# Patient Record
Sex: FEMALE | Race: WHITE | Employment: FULL TIME | URBAN - METROPOLITAN AREA
[De-identification: names, ages, dates, MRNs, and addresses within clinical notes are randomized per-mention and may not be internally consistent; named-entity substitution may affect disease eponyms.]

---

## 2017-01-23 ENCOUNTER — HOSPITAL ENCOUNTER (OUTPATIENT)
Age: 62
Discharge: HOME | End: 2017-01-23
Attending: FAMILY MEDICINE
Payer: COMMERCIAL

## 2017-01-23 VITALS
HEIGHT: 63 IN | HEART RATE: 92 BPM | WEIGHT: 250 LBS | SYSTOLIC BLOOD PRESSURE: 151 MMHG | BODY MASS INDEX: 44.3 KG/M2 | DIASTOLIC BLOOD PRESSURE: 65 MMHG | RESPIRATION RATE: 20 BRPM | OXYGEN SATURATION: 98 % | TEMPERATURE: 98 F

## 2017-01-23 DIAGNOSIS — J40 BRONCHITIS: Primary | ICD-10-CM

## 2017-01-23 PROCEDURE — 99213 OFFICE O/P EST LOW 20 MIN: CPT

## 2017-01-23 PROCEDURE — 99214 OFFICE O/P EST MOD 30 MIN: CPT

## 2017-01-23 RX ORDER — AZITHROMYCIN 250 MG/1
TABLET, FILM COATED ORAL
Qty: 1 PACKAGE | Refills: 0 | Status: SHIPPED | OUTPATIENT
Start: 2017-01-23 | End: 2017-02-17 | Stop reason: ALTCHOICE

## 2017-01-23 RX ORDER — HYDROCODONE POLISTIREX AND CHLORPHENIRAMINE POLISTIREX 10; 8 MG/5ML; MG/5ML
5 SUSPENSION, EXTENDED RELEASE ORAL 2 TIMES DAILY PRN
Qty: 115 ML | Refills: 0 | Status: SHIPPED | OUTPATIENT
Start: 2017-01-23 | End: 2017-02-06

## 2017-01-23 RX ORDER — ATORVASTATIN CALCIUM 20 MG/1
TABLET, FILM COATED ORAL
COMMUNITY
Start: 2016-11-14 | End: 2017-05-30

## 2017-01-23 RX ORDER — SWAB
SWAB, NON-MEDICATED MISCELLANEOUS
COMMUNITY
End: 2017-02-17

## 2017-01-23 NOTE — ED PROVIDER NOTES
Patient Seen in: [address]    History   Patient presents with:  Cough/URI    Stated Complaint: FEVER, PAROXYSMAL COUGHING AND BODY ACHES    Patient is a 57year old female presenting with cough.  The history is provided Diabetes Father    • Heart Disorder Father    • Diabetes Mother    • Breast Cancer Mother 52     2nd primary @ 79   • Macular degeneration Neg    • Glaucoma Neg    • Cancer Maternal Aunt 70     pancreatic cancer; d.74   • Breast Cancer Maternal Aunt 36 normal. Pupils are equal, round, and reactive to light. Neck: Normal range of motion. Neck supple. Cardiovascular: Normal rate and regular rhythm. Pulmonary/Chest: Effort normal and breath sounds normal. She has no wheezes. She has no rales.    Ted

## 2017-02-17 ENCOUNTER — OFFICE VISIT (OUTPATIENT)
Dept: INTERNAL MEDICINE CLINIC | Facility: CLINIC | Age: 62
End: 2017-02-17

## 2017-02-17 VITALS
HEART RATE: 81 BPM | TEMPERATURE: 99 F | WEIGHT: 254 LBS | BODY MASS INDEX: 44.45 KG/M2 | DIASTOLIC BLOOD PRESSURE: 81 MMHG | HEIGHT: 63.5 IN | SYSTOLIC BLOOD PRESSURE: 144 MMHG

## 2017-02-17 DIAGNOSIS — I10 ESSENTIAL HYPERTENSION: ICD-10-CM

## 2017-02-17 DIAGNOSIS — R06.83 SNORING: ICD-10-CM

## 2017-02-17 DIAGNOSIS — Z76.89 ESTABLISHING CARE WITH NEW DOCTOR, ENCOUNTER FOR: Primary | ICD-10-CM

## 2017-02-17 DIAGNOSIS — E11.9 TYPE 2 DIABETES MELLITUS WITHOUT COMPLICATION, WITHOUT LONG-TERM CURRENT USE OF INSULIN (HCC): ICD-10-CM

## 2017-02-17 DIAGNOSIS — M19.90 OSTEOARTHRITIS, UNSPECIFIED OSTEOARTHRITIS TYPE, UNSPECIFIED SITE: ICD-10-CM

## 2017-02-17 DIAGNOSIS — E78.5 DYSLIPIDEMIA: ICD-10-CM

## 2017-02-17 PROBLEM — K21.9 GERD (GASTROESOPHAGEAL REFLUX DISEASE): Status: ACTIVE | Noted: 2017-02-17

## 2017-02-17 PROCEDURE — 99212 OFFICE O/P EST SF 10 MIN: CPT | Performed by: INTERNAL MEDICINE

## 2017-02-17 PROCEDURE — 99204 OFFICE O/P NEW MOD 45 MIN: CPT | Performed by: INTERNAL MEDICINE

## 2017-02-17 RX ORDER — PANTOPRAZOLE SODIUM 40 MG/1
40 TABLET, DELAYED RELEASE ORAL
Qty: 30 TABLET | Refills: 0 | Status: SHIPPED | OUTPATIENT
Start: 2017-02-17 | End: 2017-03-09

## 2017-02-17 NOTE — PATIENT INSTRUCTIONS
Taking Your Blood Pressure  Blood pressure is the force of blood against the artery wall as it moves from the heart through the blood vessels. You can take your own blood pressure reading using a digital monitor.  Take readings as often as your healthcare · Write down your blood pressure numbers for each reading. Note the date and time. Keep your results in one place, such as a notebook. Even if your monitor has a built-in memory, keep a hard copy of the readings. · Remove the cuff from your arm.  Turn off Tracking your readings  Every time you check your blood sugar, use your log to keep track of your readings. Your meter will also probably have a memory feature that your healthcare provider can check at your next visit.  You may be advised by your healthcar

## 2017-02-17 NOTE — PROGRESS NOTES
HPI:    Patient ID: Cyril Hsu is a 57year old female. HPI She is here to establish care with new physician.   She does have diabetes diagnosed last year, her last HbA1c 1 year ago was 8 ,she is taking metformin 500 twice daily, she is checking her dizziness, tremors, speech difficulty, weakness, light-headedness, numbness and headaches. Hematological: Negative for adenopathy. Does not bruise/bleed easily.    Psychiatric/Behavioral: Negative for hallucinations, behavioral problems, confusion, sleep ca   • Breast Cancer Maternal Aunt 79   • Breast Cancer Maternal Cousin Female 27   • Cancer Maternal Cousin Female 46     thyroid ca      Social History:   Smoking Status: Never Smoker                      Smokeless Status: Never Used distension and no mass. There is no hepatosplenomegaly. There is no tenderness. There is no rigidity, no rebound, no guarding and no CVA tenderness. Musculoskeletal: Normal range of motion. She exhibits no edema or tenderness.    Bilateral barefoot skin d spicy foods and caffeine and caffinated beverages. Careful with acidic foods. Elevate head of bed. Wait 2 hrs. after eating before going to bed to allow digestion. No orders of the defined types were placed in this encounter.        Meds This Visit:  Si

## 2017-03-09 ENCOUNTER — TELEPHONE (OUTPATIENT)
Dept: INTERNAL MEDICINE CLINIC | Facility: CLINIC | Age: 62
End: 2017-03-09

## 2017-03-09 RX ORDER — PANTOPRAZOLE SODIUM 40 MG/1
40 TABLET, DELAYED RELEASE ORAL
Qty: 90 TABLET | Refills: 0 | Status: SHIPPED | OUTPATIENT
Start: 2017-03-09 | End: 2017-05-30

## 2017-03-09 NOTE — TELEPHONE ENCOUNTER
•  Pantoprazole Sodium 40 MG Oral Tab EC, Take 1 tablet (40 mg total) by mouth every morning before breakfast., Disp: 30 tablet, Rfl: 0    •  MetFORMIN HCl 500 MG Oral Tab, Take 500 mg by mouth 2 (two) times daily with meals. , Disp: , Rfl:     Patient

## 2017-03-10 ENCOUNTER — TELEPHONE (OUTPATIENT)
Dept: INTERNAL MEDICINE CLINIC | Facility: CLINIC | Age: 62
End: 2017-03-10

## 2017-03-10 NOTE — TELEPHONE ENCOUNTER
Patient returning call, informed patient Dr. Johnny Almanza has received her fax and will like her to continue same medications. Patient requesting to have 90 day supply of new dosage Metformin 1000 BID sent over to pharmacy.  Called pharmacy and updated dosage to Dara Owen

## 2017-03-10 NOTE — TELEPHONE ENCOUNTER
Attempted to contact patient to inform received fax, and inform of Dr. Henny Otero instructions. Voicemail left to call back office.

## 2017-04-29 ENCOUNTER — HOSPITAL ENCOUNTER (OUTPATIENT)
Dept: MAMMOGRAPHY | Age: 62
Discharge: HOME | End: 2017-04-29
Attending: INTERNAL MEDICINE
Payer: COMMERCIAL

## 2017-04-29 DIAGNOSIS — M19.90 OSTEOARTHRITIS, UNSPECIFIED OSTEOARTHRITIS TYPE, UNSPECIFIED SITE: ICD-10-CM

## 2017-04-29 PROCEDURE — 77067 SCR MAMMO BI INCL CAD: CPT

## 2017-05-09 ENCOUNTER — TELEPHONE (OUTPATIENT)
Dept: INTERNAL MEDICINE CLINIC | Facility: CLINIC | Age: 62
End: 2017-05-09

## 2017-05-09 RX ORDER — BLOOD-GLUCOSE METER
EACH MISCELLANEOUS
Qty: 1 KIT | Refills: 0 | Status: SHIPPED | OUTPATIENT
Start: 2017-05-09 | End: 2018-08-31

## 2017-05-09 RX ORDER — FENOFIBRATE 160 MG/1
160 TABLET ORAL DAILY
Qty: 30 TABLET | Refills: 2 | Status: SHIPPED | OUTPATIENT
Start: 2017-05-09 | End: 2017-05-30

## 2017-05-09 RX ORDER — LANCETS
EACH MISCELLANEOUS
Qty: 100 EACH | Refills: 3 | Status: SHIPPED | OUTPATIENT
Start: 2017-05-09 | End: 2017-05-30

## 2017-05-09 NOTE — TELEPHONE ENCOUNTER
Current outpatient prescriptions:     •  Benazepril-Hydrochlorothiazide (LOTENSIN HCT) 10-12.5 MG Oral Tab, Take  by mouth., Disp: , Rfl:     •  Fenofibrate 160 MG Oral Tab, Take  by mouth., Disp: , Rfl:     Patient requesting 90 day refills    Also requ

## 2017-05-26 ENCOUNTER — OFFICE VISIT (OUTPATIENT)
Dept: PULMONOLOGY | Facility: CLINIC | Age: 62
End: 2017-05-26

## 2017-05-26 VITALS
OXYGEN SATURATION: 95 % | RESPIRATION RATE: 18 BRPM | SYSTOLIC BLOOD PRESSURE: 132 MMHG | DIASTOLIC BLOOD PRESSURE: 80 MMHG | HEART RATE: 75 BPM | BODY MASS INDEX: 44.79 KG/M2 | HEIGHT: 63 IN | WEIGHT: 252.81 LBS

## 2017-05-26 DIAGNOSIS — G47.33 OSA (OBSTRUCTIVE SLEEP APNEA): Primary | ICD-10-CM

## 2017-05-26 PROCEDURE — 99243 OFF/OP CNSLTJ NEW/EST LOW 30: CPT | Performed by: INTERNAL MEDICINE

## 2017-05-26 PROCEDURE — 99212 OFFICE O/P EST SF 10 MIN: CPT | Performed by: INTERNAL MEDICINE

## 2017-05-26 NOTE — PROGRESS NOTES
[address], [place]    Report of Consultation    [facility] Patient Status:  Outpatient    3/5/1955 MRN [id number]   Location [address] [facility] Attending No att. providers found   Hosp Day #  PCP Trinity Kirkland, • Cancer Maternal Cousin Female 46     thyroid ca       Social History    Smoking Status: Never Smoker                      Smokeless Status: Never Used                        Alcohol Use: Yes                Comment: yohana PETERS rarely         Current Medicat

## 2017-05-30 ENCOUNTER — OFFICE VISIT (OUTPATIENT)
Dept: INTERNAL MEDICINE CLINIC | Facility: CLINIC | Age: 62
End: 2017-05-30

## 2017-05-30 VITALS
HEART RATE: 71 BPM | SYSTOLIC BLOOD PRESSURE: 131 MMHG | DIASTOLIC BLOOD PRESSURE: 83 MMHG | TEMPERATURE: 99 F | WEIGHT: 255.63 LBS | BODY MASS INDEX: 45.29 KG/M2 | HEIGHT: 63 IN

## 2017-05-30 DIAGNOSIS — I10 ESSENTIAL HYPERTENSION: Primary | ICD-10-CM

## 2017-05-30 DIAGNOSIS — E11.9 TYPE 2 DIABETES MELLITUS WITHOUT COMPLICATION, WITHOUT LONG-TERM CURRENT USE OF INSULIN (HCC): ICD-10-CM

## 2017-05-30 PROCEDURE — 36415 COLL VENOUS BLD VENIPUNCTURE: CPT | Performed by: INTERNAL MEDICINE

## 2017-05-30 PROCEDURE — 99214 OFFICE O/P EST MOD 30 MIN: CPT | Performed by: INTERNAL MEDICINE

## 2017-05-30 PROCEDURE — 99212 OFFICE O/P EST SF 10 MIN: CPT | Performed by: INTERNAL MEDICINE

## 2017-05-30 RX ORDER — PANTOPRAZOLE SODIUM 40 MG/1
40 TABLET, DELAYED RELEASE ORAL
Qty: 90 TABLET | Refills: 1 | Status: SHIPPED | OUTPATIENT
Start: 2017-05-30 | End: 2017-11-21

## 2017-05-30 RX ORDER — ATORVASTATIN CALCIUM 20 MG/1
20 TABLET, FILM COATED ORAL NIGHTLY
Qty: 90 TABLET | Refills: 1 | Status: SHIPPED | OUTPATIENT
Start: 2017-05-30 | End: 2017-11-21

## 2017-05-30 RX ORDER — LANCETS
EACH MISCELLANEOUS
Qty: 100 EACH | Refills: 3 | Status: SHIPPED | OUTPATIENT
Start: 2017-05-30 | End: 2017-10-24

## 2017-05-30 RX ORDER — FENOFIBRATE 160 MG/1
160 TABLET ORAL DAILY
Qty: 90 TABLET | Refills: 1 | Status: SHIPPED | OUTPATIENT
Start: 2017-05-30 | End: 2018-08-31 | Stop reason: CLARIF

## 2017-05-30 NOTE — PATIENT INSTRUCTIONS
Step-by-Step  Checking Your Blood Pressure    Date Last Reviewed: 4/27/2016  © 0945-5636 [address], [facility] [employer]. All rights reserved.  This information is not intended as a substitute for professional medical A pedometer is a small device that keeps track of how many steps you take. You can clip it to your belt (or a strap on your arm or leg) and go about your daily routine.  \"Smartphones\" now also have apps to record your walking. At the end of the day, the portia Physical activity is important when you have diabetes. But you need to keep an eye on your blood sugar level. Check often if you have been active for longer than usual, or if the activity was unplanned.  Make it a habit to check your blood sugar before bein

## 2017-05-30 NOTE — PROGRESS NOTES
HPI:    Patient ID: Michele Sawant is a 60year old female. HPI she came in today for follow up on htn and diabetes  Htn  She does check her blood pressure at home and is watching her diet , her bp fluctuates . She does take her medication regularly Outpatient Prescriptions:  MetFORMIN HCl 1000 MG Oral Tab Take 1 tablet (1,000 mg total) by mouth 2 (two) times daily. Disp: 180 tablet Rfl: 1   Benazepril-Hydrochlorothiazide (LOTENSIN HCT) 10-12.5 MG Oral Tab Take 1 tablet by mouth daily.  Disp: 90 tablet Cancer Maternal Grandfather 68     prostate ca; d.76   • Cancer Paternal Grandfather 61     colon ca   • Breast Cancer Maternal Aunt 79   • Breast Cancer Maternal Cousin Female 27   • Cancer Maternal Cousin Female 46     thyroid ca      Social History:   S tenderness. Abdominal: Soft. Bowel sounds are normal. She exhibits no shifting dullness, no distension and no mass. There is no hepatosplenomegaly. There is no tenderness. There is no rigidity, no rebound, no guarding and no CVA tenderness.    Musculoskel Pantoprazole Sodium 40 MG Oral Tab EC 90 tablet 1      Sig: Take 1 tablet (40 mg total) by mouth every morning before breakfast.      atorvastatin 20 MG Oral Tab 90 tablet 1      Sig: Take 1 tablet (20 mg total) by mouth nightly.            Imaging & Referr

## 2017-06-13 ENCOUNTER — HOSPITAL ENCOUNTER (OUTPATIENT)
Dept: GENERAL RADIOLOGY | Age: 62
Discharge: HOME | End: 2017-06-13
Attending: ORTHOPAEDIC SURGERY
Payer: COMMERCIAL

## 2017-06-13 DIAGNOSIS — M17.12 OSTEOARTHRITIS OF LEFT KNEE: ICD-10-CM

## 2017-06-13 PROCEDURE — 73564 X-RAY EXAM KNEE 4 OR MORE: CPT | Performed by: ORTHOPAEDIC SURGERY

## 2017-06-15 ENCOUNTER — OFFICE VISIT (OUTPATIENT)
Dept: SLEEP CENTER | Age: 62
End: 2017-06-15
Attending: INTERNAL MEDICINE
Payer: COMMERCIAL

## 2017-06-15 DIAGNOSIS — Z76.89 SLEEP CONCERN: Primary | ICD-10-CM

## 2017-06-15 PROCEDURE — 95810 POLYSOM 6/> YRS 4/> PARAM: CPT

## 2017-06-18 NOTE — PROCEDURES
[facility]  Accredited by the [employer] ([employer])    PATIENT'S NAME: Mani Mnotenegro   ATTENDING PHYSICIAN: Zulema Ochoa. Lenin Barth MD   REFERRING PHYSICIAN: Zulema Ochoa.  Lenin Barth MD   PATIENT ACCOUNT #: [de-identified] LOCATION: Slee hour for a combined arousal index of 20 events per hour. There were 221 periodic limb movements for a periodic limb movement index of 4.4 events per hour, of which 9.2 per hour were associated with arousal.  Lowest desaturation was to 84%.   The average he

## 2017-06-20 ENCOUNTER — TELEPHONE (OUTPATIENT)
Dept: PULMONOLOGY | Facility: CLINIC | Age: 62
End: 2017-06-20

## 2017-06-20 DIAGNOSIS — G47.33 OSA (OBSTRUCTIVE SLEEP APNEA): Primary | ICD-10-CM

## 2017-06-20 NOTE — TELEPHONE ENCOUNTER
Pt rec'd call from Dr. Roman Magallon office about sleep study results and was told she needs to set up CPAP titration. Pt would like to know what she needs to do next. Will Dr. Devyn Rubin enter order? Please call.

## 2017-06-21 NOTE — TELEPHONE ENCOUNTER
Pt notified that Mahesh Bernal has ordered the titration. Pt aware that [facility] dept will get the PA. Pt given the number to [facility] [phone]. Pt aware to schedule the titration once it has been approved.

## 2017-06-22 ENCOUNTER — TELEPHONE (OUTPATIENT)
Dept: INTERNAL MEDICINE CLINIC | Facility: CLINIC | Age: 62
End: 2017-06-22

## 2017-06-22 NOTE — TELEPHONE ENCOUNTER
Patient calling to let you know she is scheduled for left knee surgery July 10th with Dr Hernando Glass. YOSSII.

## 2017-06-23 ENCOUNTER — TELEPHONE (OUTPATIENT)
Dept: PULMONOLOGY | Facility: CLINIC | Age: 62
End: 2017-06-23

## 2017-06-23 ENCOUNTER — OFFICE VISIT (OUTPATIENT)
Dept: INTERNAL MEDICINE CLINIC | Facility: CLINIC | Age: 62
End: 2017-06-23

## 2017-06-23 VITALS
HEIGHT: 63 IN | HEART RATE: 75 BPM | TEMPERATURE: 98 F | SYSTOLIC BLOOD PRESSURE: 127 MMHG | WEIGHT: 254 LBS | DIASTOLIC BLOOD PRESSURE: 79 MMHG | BODY MASS INDEX: 45 KG/M2

## 2017-06-23 DIAGNOSIS — Z01.818 PREOP EXAMINATION: ICD-10-CM

## 2017-06-23 DIAGNOSIS — Z01.818 PRE-OPERATIVE CLEARANCE: Primary | ICD-10-CM

## 2017-06-23 PROCEDURE — 36415 COLL VENOUS BLD VENIPUNCTURE: CPT | Performed by: INTERNAL MEDICINE

## 2017-06-23 PROCEDURE — 93005 ELECTROCARDIOGRAM TRACING: CPT | Performed by: INTERNAL MEDICINE

## 2017-06-23 PROCEDURE — 93000 ELECTROCARDIOGRAM COMPLETE: CPT | Performed by: INTERNAL MEDICINE

## 2017-06-23 PROCEDURE — 99212 OFFICE O/P EST SF 10 MIN: CPT | Performed by: INTERNAL MEDICINE

## 2017-06-23 PROCEDURE — 99214 OFFICE O/P EST MOD 30 MIN: CPT | Performed by: INTERNAL MEDICINE

## 2017-06-23 RX ORDER — MELATONIN
1000 DAILY
COMMUNITY
End: 2017-10-24

## 2017-06-23 NOTE — PROGRESS NOTES
Emilio Mitchell is a 60year old female who presents for a pre-operative physical exam. Patient is to have left knee surgery  to be done by Dr. Levy Koyanagi on July 10     Pt has had previous anesthesia:  Yes.   Previous complications:  No  Patient presents with: Diabetes mellitus ([facility] [address]    • Diabetes [facility])           Past Surgical History    BSO, OMENTECTOMY W/BETH      Comment 1 ovary left    COLONOSCOPY  2014      1977      1982      1985    TONSILLECTOMY Pulse 75  Temp(Src) 98.4 °F (36.9 °C) (Oral)  Ht 5' 3\" (1.6 m)  Wt 254 lb (115.214 kg)  BMI 45.01 kg/m2 Body mass index is 45.01 kg/(m^2).   GENERAL: well developed, well nourished,in no apparent distress  SKIN: no rashes,no suspicious lesions  HEENT: atr RBC 5.06 3.70-5.40 M/UL   HGB 13.7 12.0-16.0 g/dL   HCT 41.9 35.0-48.0 %   MCV 82.9 80.0-100.0 fL   MCH 27.1 27.0-32.0 pg   MCHC 32.7 32.0-37.0 g/dl   RDW 14.2 11.0-15.0 %    140-400 K/UL   MPV 8.5 7.4-10.3 fL   Neutrophil % 64 %   Lymphocyte % 23

## 2017-06-23 NOTE — PATIENT INSTRUCTIONS
Established High Blood Pressure    High blood pressure (hypertension) is a chronic disease. Often health care providers don’t know what causes it. But it can be caused by certain health conditions and medicines.   If you have high blood pressure, you may · Don’t take medicines that have heart stimulants. This includes many cold and sinus decongestant pills and sprays, as well as diet pills. Check the warnings about hypertension on the label.  Stimulants such as amphetamine or cocaine could be lethal for juan josé © 5099-4800 The [address] [place] [address], [address] [place]. All rights reserved. This information is not intended as a substitute for professional medical care. Always follow your healthcare professional's instructions.         Women a Women often don’t realize their symptoms could be related to heart trouble. Even some healthcare providers don’t make the connection.  If you feel any of the symptoms listed here, see your healthcare provider and ask to be tested for heart disease—even if y

## 2017-06-26 ENCOUNTER — APPOINTMENT (OUTPATIENT)
Dept: SLEEP CENTER | Age: 62
End: 2017-06-26
Attending: INTERNAL MEDICINE
Payer: COMMERCIAL

## 2017-06-26 ENCOUNTER — OFFICE VISIT (OUTPATIENT)
Dept: SLEEP CENTER | Age: 62
End: 2017-06-26
Attending: INTERNAL MEDICINE
Payer: COMMERCIAL

## 2017-06-26 ENCOUNTER — TELEPHONE (OUTPATIENT)
Dept: INTERNAL MEDICINE CLINIC | Facility: CLINIC | Age: 62
End: 2017-06-26

## 2017-06-26 DIAGNOSIS — Z76.89 SLEEP CONCERN: Primary | ICD-10-CM

## 2017-06-26 PROCEDURE — 95811 POLYSOM 6/>YRS CPAP 4/> PARM: CPT

## 2017-06-28 NOTE — TELEPHONE ENCOUNTER
Patient states needed procedure code for stress test. Per patient already received code and insurance was provided with code as well.

## 2017-06-29 ENCOUNTER — LAB ENCOUNTER (OUTPATIENT)
Dept: LAB | Facility: HOSPITAL | Age: 62
End: 2017-06-29
Attending: ORTHOPAEDIC SURGERY
Payer: COMMERCIAL

## 2017-06-29 ENCOUNTER — TELEPHONE (OUTPATIENT)
Dept: PULMONOLOGY | Facility: CLINIC | Age: 62
End: 2017-06-29

## 2017-06-29 DIAGNOSIS — G47.33 OSA (OBSTRUCTIVE SLEEP APNEA): Primary | ICD-10-CM

## 2017-06-29 DIAGNOSIS — Z01.818 PREOP EXAMINATION: Primary | ICD-10-CM

## 2017-06-29 LAB
ANTIBODY SCREEN: NEGATIVE
RH BLOOD TYPE: POSITIVE

## 2017-06-29 PROCEDURE — 86901 BLOOD TYPING SEROLOGIC RH(D): CPT

## 2017-06-29 PROCEDURE — 36415 COLL VENOUS BLD VENIPUNCTURE: CPT

## 2017-06-29 PROCEDURE — 86850 RBC ANTIBODY SCREEN: CPT

## 2017-06-29 PROCEDURE — 86900 BLOOD TYPING SEROLOGIC ABO: CPT

## 2017-06-29 PROCEDURE — 87641 MR-STAPH DNA AMP PROBE: CPT

## 2017-06-29 NOTE — PROCEDURES
[facility]  Accredited by the [facility] Sleep Medicine ([facility])    PATIENT'S NAME: Topherarunyissel Han   ATTENDING PHYSICIAN: Monica Galvan. Whit Ridley MD   REFERRING PHYSICIAN: Monica Galvan.  Whit Ridley MD   PATIENT ACCOUNT #: [de-identified] LOCATION: [facility] hour.  There were 130 periodic limb movements for a periodic limb movement index of 30.3 events per hour of which 4.9 per hour were associated with arousal.  The lowest desaturation was to 89%.   The average heart rate was 57 beats per minute and there was

## 2017-06-29 NOTE — TELEPHONE ENCOUNTER
Dr. Elex Pallas, pt is requesting order for CPAP. Please review order and sign. Pt states she had CPAP titration on Monday this week and is requesting order for CPAP machine. Pt notified Dr. Elex Pallas will be back in the office tomorrow to approve order.  Pt sta

## 2017-06-30 LAB — MRSA DNA SPEC QL NAA+PROBE: NEGATIVE

## 2017-06-30 NOTE — TELEPHONE ENCOUNTER
Order approved by Dr. Idris Mina. Order, face sheet, PSG, titration, and progress note faxed to [facility] [phone]. Fax confirmation received. Pt notified of this and given [employer] phone # [phone].

## 2017-06-30 NOTE — TELEPHONE ENCOUNTER
Order with Dr. Aysha Bahena NPI faxed to [facility] [place] [phone]. Fax confirmation received.

## 2017-07-03 ENCOUNTER — HOSPITAL ENCOUNTER (OUTPATIENT)
Dept: NUCLEAR MEDICINE | Facility: HOSPITAL | Age: 62
Discharge: HOME | End: 2017-07-03
Attending: INTERNAL MEDICINE
Payer: COMMERCIAL

## 2017-07-03 ENCOUNTER — HOSPITAL ENCOUNTER (OUTPATIENT)
Dept: CV DIAGNOSTICS | Facility: HOSPITAL | Age: 62
Discharge: HOME | End: 2017-07-03
Attending: INTERNAL MEDICINE
Payer: COMMERCIAL

## 2017-07-03 DIAGNOSIS — Z01.818 PRE-OPERATIVE CLEARANCE: ICD-10-CM

## 2017-07-03 PROCEDURE — 93017 CV STRESS TEST TRACING ONLY: CPT | Performed by: INTERNAL MEDICINE

## 2017-07-03 PROCEDURE — 93016 CV STRESS TEST SUPVJ ONLY: CPT | Performed by: INTERNAL MEDICINE

## 2017-07-03 PROCEDURE — 93018 CV STRESS TEST I&R ONLY: CPT | Performed by: INTERNAL MEDICINE

## 2017-07-03 PROCEDURE — 78452 HT MUSCLE IMAGE SPECT MULT: CPT | Performed by: INTERNAL MEDICINE

## 2017-07-03 RX ORDER — 0.9 % SODIUM CHLORIDE 0.9 %
VIAL (ML) INJECTION
Status: COMPLETED
Start: 2017-07-03 | End: 2017-07-03

## 2017-07-03 RX ADMIN — 0.9 % SODIUM CHLORIDE: 0.9 % VIAL (ML) INJECTION at 08:46:00

## 2017-07-05 ENCOUNTER — TELEPHONE (OUTPATIENT)
Dept: INTERNAL MEDICINE CLINIC | Facility: CLINIC | Age: 62
End: 2017-07-05

## 2017-07-05 NOTE — TELEPHONE ENCOUNTER
Called patient to let her know that her pre op clearance was faxed to Dr. Partha Black successfully.

## 2017-07-10 ENCOUNTER — HOSPITAL ENCOUNTER (INPATIENT)
Facility: HOSPITAL | Age: 62
LOS: 4 days | Discharge: HOME HEALTH | DRG: 470 | End: 2017-07-14
Attending: ORTHOPAEDIC SURGERY | Admitting: ORTHOPAEDIC SURGERY
Payer: COMMERCIAL

## 2017-07-10 ENCOUNTER — ANESTHESIA (OUTPATIENT)
Dept: SURGERY | Facility: HOSPITAL | Age: 62
DRG: 470 | End: 2017-07-10
Payer: COMMERCIAL

## 2017-07-10 ENCOUNTER — ANESTHESIA EVENT (OUTPATIENT)
Dept: SURGERY | Facility: HOSPITAL | Age: 62
DRG: 470 | End: 2017-07-10
Payer: COMMERCIAL

## 2017-07-10 ENCOUNTER — APPOINTMENT (OUTPATIENT)
Dept: GENERAL RADIOLOGY | Facility: HOSPITAL | Age: 62
DRG: 470 | End: 2017-07-10
Attending: ORTHOPAEDIC SURGERY
Payer: COMMERCIAL

## 2017-07-10 ENCOUNTER — SURGERY (OUTPATIENT)
Age: 62
End: 2017-07-10

## 2017-07-10 DIAGNOSIS — M17.12 PRIMARY OSTEOARTHRITIS OF LEFT KNEE: Primary | ICD-10-CM

## 2017-07-10 LAB
GLUCOSE BLDC GLUCOMTR-MCNC: 125 MG/DL (ref 70–99)
GLUCOSE BLDC GLUCOMTR-MCNC: 159 MG/DL (ref 70–99)
GLUCOSE BLDC GLUCOMTR-MCNC: 199 MG/DL (ref 70–99)
GLUCOSE BLDC GLUCOMTR-MCNC: 214 MG/DL (ref 70–99)
INR BLD: 1 (ref 0.9–1.2)
PROTHROMBIN TIME: 13.2 SECONDS (ref 11.8–14.5)

## 2017-07-10 PROCEDURE — 0SRD0J9 REPLACEMENT OF LEFT KNEE JOINT WITH SYNTHETIC SUBSTITUTE, CEMENTED, OPEN APPROACH: ICD-10-PCS | Performed by: ORTHOPAEDIC SURGERY

## 2017-07-10 PROCEDURE — 3E0T3BZ INTRODUCTION OF ANESTHETIC AGENT INTO PERIPHERAL NERVES AND PLEXI, PERCUTANEOUS APPROACH: ICD-10-PCS | Performed by: ANESTHESIOLOGY

## 2017-07-10 PROCEDURE — 99221 1ST HOSP IP/OBS SF/LOW 40: CPT | Performed by: INTERNAL MEDICINE

## 2017-07-10 PROCEDURE — 73560 X-RAY EXAM OF KNEE 1 OR 2: CPT | Performed by: ORTHOPAEDIC SURGERY

## 2017-07-10 DEVICE — CEMENT BONE ZIM PALICOS R: Type: IMPLANTABLE DEVICE | Site: KNEE | Status: FUNCTIONAL

## 2017-07-10 DEVICE — COMPONENT PTLR 31MM 1 PG WRE: Type: IMPLANTABLE DEVICE | Site: KNEE | Status: FUNCTIONAL

## 2017-07-10 DEVICE — IMPLANTABLE DEVICE: Type: IMPLANTABLE DEVICE | Site: KNEE | Status: FUNCTIONAL

## 2017-07-10 RX ORDER — GLYCOPYRROLATE 0.2 MG/ML
INJECTION INTRAMUSCULAR; INTRAVENOUS AS NEEDED
Status: DISCONTINUED | OUTPATIENT
Start: 2017-07-10 | End: 2017-07-10 | Stop reason: SURG

## 2017-07-10 RX ORDER — SODIUM CHLORIDE, SODIUM LACTATE, POTASSIUM CHLORIDE, CALCIUM CHLORIDE 600; 310; 30; 20 MG/100ML; MG/100ML; MG/100ML; MG/100ML
INJECTION, SOLUTION INTRAVENOUS CONTINUOUS
Status: DISCONTINUED | OUTPATIENT
Start: 2017-07-10 | End: 2017-07-14

## 2017-07-10 RX ORDER — ROCURONIUM BROMIDE 10 MG/ML
INJECTION, SOLUTION INTRAVENOUS AS NEEDED
Status: DISCONTINUED | OUTPATIENT
Start: 2017-07-10 | End: 2017-07-10 | Stop reason: SURG

## 2017-07-10 RX ORDER — DIPHENHYDRAMINE HYDROCHLORIDE 50 MG/ML
12.5 INJECTION INTRAMUSCULAR; INTRAVENOUS EVERY 4 HOURS PRN
Status: DISCONTINUED | OUTPATIENT
Start: 2017-07-10 | End: 2017-07-14

## 2017-07-10 RX ORDER — SODIUM CHLORIDE, SODIUM LACTATE, POTASSIUM CHLORIDE, CALCIUM CHLORIDE 600; 310; 30; 20 MG/100ML; MG/100ML; MG/100ML; MG/100ML
INJECTION, SOLUTION INTRAVENOUS CONTINUOUS PRN
Status: DISCONTINUED | OUTPATIENT
Start: 2017-07-10 | End: 2017-07-10 | Stop reason: SURG

## 2017-07-10 RX ORDER — WARFARIN SODIUM 5 MG/1
5 TABLET ORAL ONCE
Status: COMPLETED | OUTPATIENT
Start: 2017-07-10 | End: 2017-07-10

## 2017-07-10 RX ORDER — HYDROMORPHONE HYDROCHLORIDE 1 MG/ML
0.6 INJECTION, SOLUTION INTRAMUSCULAR; INTRAVENOUS; SUBCUTANEOUS EVERY 5 MIN PRN
Status: DISCONTINUED | OUTPATIENT
Start: 2017-07-10 | End: 2017-07-10 | Stop reason: HOSPADM

## 2017-07-10 RX ORDER — MORPHINE SULFATE 4 MG/ML
6 INJECTION, SOLUTION INTRAMUSCULAR; INTRAVENOUS EVERY 10 MIN PRN
Status: DISCONTINUED | OUTPATIENT
Start: 2017-07-10 | End: 2017-07-10 | Stop reason: HOSPADM

## 2017-07-10 RX ORDER — NALOXONE HYDROCHLORIDE 0.4 MG/ML
0.08 INJECTION, SOLUTION INTRAMUSCULAR; INTRAVENOUS; SUBCUTANEOUS
Status: DISCONTINUED | OUTPATIENT
Start: 2017-07-10 | End: 2017-07-14

## 2017-07-10 RX ORDER — OXYCODONE HYDROCHLORIDE 5 MG/1
5 TABLET ORAL EVERY 4 HOURS PRN
Status: DISCONTINUED | OUTPATIENT
Start: 2017-07-10 | End: 2017-07-11

## 2017-07-10 RX ORDER — 0.9 % SODIUM CHLORIDE 0.9 %
VIAL (ML) INJECTION
Status: COMPLETED
Start: 2017-07-10 | End: 2017-07-10

## 2017-07-10 RX ORDER — HYDROCODONE BITARTRATE AND ACETAMINOPHEN 5; 325 MG/1; MG/1
1 TABLET ORAL AS NEEDED
Status: DISCONTINUED | OUTPATIENT
Start: 2017-07-10 | End: 2017-07-10 | Stop reason: HOSPADM

## 2017-07-10 RX ORDER — DEXAMETHASONE SODIUM PHOSPHATE 10 MG/ML
INJECTION, SOLUTION INTRAMUSCULAR; INTRAVENOUS AS NEEDED
Status: DISCONTINUED | OUTPATIENT
Start: 2017-07-10 | End: 2017-07-10 | Stop reason: SURG

## 2017-07-10 RX ORDER — CHOLECALCIFEROL (VITAMIN D3) 125 MCG
1000 CAPSULE ORAL DAILY
Status: DISCONTINUED | OUTPATIENT
Start: 2017-07-10 | End: 2017-07-14

## 2017-07-10 RX ORDER — FENOFIBRATE 134 MG/1
134 CAPSULE ORAL DAILY
Status: DISCONTINUED | OUTPATIENT
Start: 2017-07-10 | End: 2017-07-14

## 2017-07-10 RX ORDER — MORPHINE SULFATE 2 MG/ML
2 INJECTION, SOLUTION INTRAMUSCULAR; INTRAVENOUS EVERY 2 HOUR PRN
Status: DISCONTINUED | OUTPATIENT
Start: 2017-07-10 | End: 2017-07-11

## 2017-07-10 RX ORDER — MORPHINE SULFATE 4 MG/ML
4 INJECTION, SOLUTION INTRAMUSCULAR; INTRAVENOUS EVERY 10 MIN PRN
Status: DISCONTINUED | OUTPATIENT
Start: 2017-07-10 | End: 2017-07-10 | Stop reason: HOSPADM

## 2017-07-10 RX ORDER — MORPHINE SULFATE 2 MG/ML
2 INJECTION, SOLUTION INTRAMUSCULAR; INTRAVENOUS EVERY 10 MIN PRN
Status: DISCONTINUED | OUTPATIENT
Start: 2017-07-10 | End: 2017-07-10 | Stop reason: HOSPADM

## 2017-07-10 RX ORDER — DEXTROSE MONOHYDRATE 25 G/50ML
50 INJECTION, SOLUTION INTRAVENOUS AS NEEDED
Status: DISCONTINUED | OUTPATIENT
Start: 2017-07-10 | End: 2017-07-14

## 2017-07-10 RX ORDER — ASPIRIN 81 MG/1
81 TABLET ORAL DAILY
Status: DISCONTINUED | OUTPATIENT
Start: 2017-07-10 | End: 2017-07-14

## 2017-07-10 RX ORDER — SODIUM CHLORIDE 9 MG/ML
INJECTION, SOLUTION INTRAVENOUS
Status: COMPLETED
Start: 2017-07-10 | End: 2017-07-10

## 2017-07-10 RX ORDER — OXYCODONE HYDROCHLORIDE 5 MG/1
10 TABLET ORAL EVERY 4 HOURS PRN
Status: DISCONTINUED | OUTPATIENT
Start: 2017-07-10 | End: 2017-07-11

## 2017-07-10 RX ORDER — ONDANSETRON 2 MG/ML
4 INJECTION INTRAMUSCULAR; INTRAVENOUS EVERY 6 HOURS PRN
Status: DISCONTINUED | OUTPATIENT
Start: 2017-07-10 | End: 2017-07-14

## 2017-07-10 RX ORDER — HYDROCODONE BITARTRATE AND ACETAMINOPHEN 5; 325 MG/1; MG/1
2 TABLET ORAL AS NEEDED
Status: DISCONTINUED | OUTPATIENT
Start: 2017-07-10 | End: 2017-07-10 | Stop reason: HOSPADM

## 2017-07-10 RX ORDER — MIDAZOLAM HYDROCHLORIDE 1 MG/ML
INJECTION INTRAMUSCULAR; INTRAVENOUS AS NEEDED
Status: DISCONTINUED | OUTPATIENT
Start: 2017-07-10 | End: 2017-07-10 | Stop reason: SURG

## 2017-07-10 RX ORDER — 0.9 % SODIUM CHLORIDE 0.9 %
VIAL (ML) INJECTION
Status: DISPENSED
Start: 2017-07-10 | End: 2017-07-11

## 2017-07-10 RX ORDER — LIDOCAINE HYDROCHLORIDE 10 MG/ML
INJECTION, SOLUTION EPIDURAL; INFILTRATION; INTRACAUDAL; PERINEURAL AS NEEDED
Status: DISCONTINUED | OUTPATIENT
Start: 2017-07-10 | End: 2017-07-10 | Stop reason: SURG

## 2017-07-10 RX ORDER — SODIUM CHLORIDE, SODIUM LACTATE, POTASSIUM CHLORIDE, CALCIUM CHLORIDE 600; 310; 30; 20 MG/100ML; MG/100ML; MG/100ML; MG/100ML
INJECTION, SOLUTION INTRAVENOUS CONTINUOUS
Status: DISCONTINUED | OUTPATIENT
Start: 2017-07-10 | End: 2017-07-10

## 2017-07-10 RX ORDER — ONDANSETRON 2 MG/ML
4 INJECTION INTRAMUSCULAR; INTRAVENOUS ONCE AS NEEDED
Status: DISCONTINUED | OUTPATIENT
Start: 2017-07-10 | End: 2017-07-10 | Stop reason: HOSPADM

## 2017-07-10 RX ORDER — PANTOPRAZOLE SODIUM 40 MG/1
40 TABLET, DELAYED RELEASE ORAL
Status: DISCONTINUED | OUTPATIENT
Start: 2017-07-10 | End: 2017-07-14

## 2017-07-10 RX ORDER — MORPHINE SULFATE 4 MG/ML
6 INJECTION, SOLUTION INTRAMUSCULAR; INTRAVENOUS EVERY 2 HOUR PRN
Status: DISCONTINUED | OUTPATIENT
Start: 2017-07-10 | End: 2017-07-11

## 2017-07-10 RX ORDER — ONDANSETRON 2 MG/ML
4 INJECTION INTRAMUSCULAR; INTRAVENOUS EVERY 4 HOURS PRN
Status: DISCONTINUED | OUTPATIENT
Start: 2017-07-10 | End: 2017-07-14

## 2017-07-10 RX ORDER — ROPIVACAINE HYDROCHLORIDE 5 MG/ML
INJECTION, SOLUTION EPIDURAL; INFILTRATION; PERINEURAL AS NEEDED
Status: DISCONTINUED | OUTPATIENT
Start: 2017-07-10 | End: 2017-07-10 | Stop reason: SURG

## 2017-07-10 RX ORDER — HALOPERIDOL 5 MG/ML
0.25 INJECTION INTRAMUSCULAR ONCE AS NEEDED
Status: DISCONTINUED | OUTPATIENT
Start: 2017-07-10 | End: 2017-07-10 | Stop reason: HOSPADM

## 2017-07-10 RX ORDER — ATORVASTATIN CALCIUM 20 MG/1
20 TABLET, FILM COATED ORAL NIGHTLY
Status: DISCONTINUED | OUTPATIENT
Start: 2017-07-10 | End: 2017-07-14

## 2017-07-10 RX ORDER — NEOSTIGMINE METHYLSULFATE 0.5 MG/ML
INJECTION INTRAVENOUS AS NEEDED
Status: DISCONTINUED | OUTPATIENT
Start: 2017-07-10 | End: 2017-07-10 | Stop reason: SURG

## 2017-07-10 RX ORDER — DEXAMETHASONE SODIUM PHOSPHATE 10 MG/ML
INJECTION, SOLUTION INTRAMUSCULAR; INTRAVENOUS AS NEEDED
Status: DISCONTINUED | OUTPATIENT
Start: 2017-07-10 | End: 2017-07-10

## 2017-07-10 RX ORDER — MORPHINE SULFATE 2 MG/ML
2 INJECTION, SOLUTION INTRAMUSCULAR; INTRAVENOUS EVERY 30 MIN PRN
Status: DISCONTINUED | OUTPATIENT
Start: 2017-07-10 | End: 2017-07-14

## 2017-07-10 RX ORDER — NALBUPHINE HCL 10 MG/ML
2.5 AMPUL (ML) INJECTION EVERY 4 HOURS PRN
Status: DISCONTINUED | OUTPATIENT
Start: 2017-07-10 | End: 2017-07-14

## 2017-07-10 RX ORDER — ACETAMINOPHEN 500 MG
1000 TABLET ORAL EVERY 8 HOURS
Status: COMPLETED | OUTPATIENT
Start: 2017-07-10 | End: 2017-07-11

## 2017-07-10 RX ORDER — MORPHINE SULFATE 4 MG/ML
4 INJECTION, SOLUTION INTRAMUSCULAR; INTRAVENOUS EVERY 2 HOUR PRN
Status: DISCONTINUED | OUTPATIENT
Start: 2017-07-10 | End: 2017-07-11

## 2017-07-10 RX ORDER — HYDROMORPHONE HYDROCHLORIDE 1 MG/ML
0.2 INJECTION, SOLUTION INTRAMUSCULAR; INTRAVENOUS; SUBCUTANEOUS EVERY 5 MIN PRN
Status: DISCONTINUED | OUTPATIENT
Start: 2017-07-10 | End: 2017-07-10 | Stop reason: HOSPADM

## 2017-07-10 RX ORDER — SODIUM CHLORIDE 0.9 % (FLUSH) 0.9 %
10 SYRINGE (ML) INJECTION AS NEEDED
Status: DISCONTINUED | OUTPATIENT
Start: 2017-07-10 | End: 2017-07-14

## 2017-07-10 RX ORDER — MORPHINE SULFATE 30 MG/1
15 TABLET ORAL EVERY 4 HOURS PRN
Status: DISCONTINUED | OUTPATIENT
Start: 2017-07-10 | End: 2017-07-11

## 2017-07-10 RX ORDER — ONDANSETRON 2 MG/ML
INJECTION INTRAMUSCULAR; INTRAVENOUS AS NEEDED
Status: DISCONTINUED | OUTPATIENT
Start: 2017-07-10 | End: 2017-07-10 | Stop reason: SURG

## 2017-07-10 RX ORDER — ENOXAPARIN SODIUM 100 MG/ML
30 INJECTION SUBCUTANEOUS 2 TIMES DAILY
Status: DISCONTINUED | OUTPATIENT
Start: 2017-07-11 | End: 2017-07-14

## 2017-07-10 RX ORDER — HYDROMORPHONE HYDROCHLORIDE 1 MG/ML
0.4 INJECTION, SOLUTION INTRAMUSCULAR; INTRAVENOUS; SUBCUTANEOUS EVERY 5 MIN PRN
Status: DISCONTINUED | OUTPATIENT
Start: 2017-07-10 | End: 2017-07-10 | Stop reason: HOSPADM

## 2017-07-10 RX ORDER — MAGNESIUM HYDROXIDE 1200 MG/15ML
LIQUID ORAL CONTINUOUS PRN
Status: DISCONTINUED | OUTPATIENT
Start: 2017-07-10 | End: 2017-07-10

## 2017-07-10 RX ORDER — NALOXONE HYDROCHLORIDE 0.4 MG/ML
80 INJECTION, SOLUTION INTRAMUSCULAR; INTRAVENOUS; SUBCUTANEOUS AS NEEDED
Status: DISCONTINUED | OUTPATIENT
Start: 2017-07-10 | End: 2017-07-10 | Stop reason: HOSPADM

## 2017-07-10 RX ADMIN — SODIUM CHLORIDE, SODIUM LACTATE, POTASSIUM CHLORIDE, CALCIUM CHLORIDE: 600; 310; 30; 20 INJECTION, SOLUTION INTRAVENOUS at 09:40:00

## 2017-07-10 RX ADMIN — ONDANSETRON 4 MG: 2 INJECTION INTRAMUSCULAR; INTRAVENOUS at 09:40:00

## 2017-07-10 RX ADMIN — GLYCOPYRROLATE 0.6 MG: 0.2 INJECTION INTRAMUSCULAR; INTRAVENOUS at 10:00:00

## 2017-07-10 RX ADMIN — SODIUM CHLORIDE, SODIUM LACTATE, POTASSIUM CHLORIDE, CALCIUM CHLORIDE: 600; 310; 30; 20 INJECTION, SOLUTION INTRAVENOUS at 08:36:00

## 2017-07-10 RX ADMIN — LIDOCAINE HYDROCHLORIDE 50 MG: 10 INJECTION, SOLUTION EPIDURAL; INFILTRATION; INTRACAUDAL; PERINEURAL at 07:41:00

## 2017-07-10 RX ADMIN — SODIUM CHLORIDE, SODIUM LACTATE, POTASSIUM CHLORIDE, CALCIUM CHLORIDE: 600; 310; 30; 20 INJECTION, SOLUTION INTRAVENOUS at 08:35:00

## 2017-07-10 RX ADMIN — MIDAZOLAM HYDROCHLORIDE 2 MG: 1 INJECTION INTRAMUSCULAR; INTRAVENOUS at 07:19:00

## 2017-07-10 RX ADMIN — SODIUM CHLORIDE, SODIUM LACTATE, POTASSIUM CHLORIDE, CALCIUM CHLORIDE: 600; 310; 30; 20 INJECTION, SOLUTION INTRAVENOUS at 10:00:00

## 2017-07-10 RX ADMIN — ROPIVACAINE HYDROCHLORIDE 30 ML: 5 INJECTION, SOLUTION EPIDURAL; INFILTRATION; PERINEURAL at 07:28:00

## 2017-07-10 RX ADMIN — DEXAMETHASONE SODIUM PHOSPHATE 4 MG: 10 INJECTION, SOLUTION INTRAMUSCULAR; INTRAVENOUS at 07:28:00

## 2017-07-10 RX ADMIN — NEOSTIGMINE METHYLSULFATE 3 MG: 0.5 INJECTION INTRAVENOUS at 10:00:00

## 2017-07-10 RX ADMIN — SODIUM CHLORIDE, SODIUM LACTATE, POTASSIUM CHLORIDE, CALCIUM CHLORIDE: 600; 310; 30; 20 INJECTION, SOLUTION INTRAVENOUS at 07:34:00

## 2017-07-10 RX ADMIN — ROCURONIUM BROMIDE 50 MG: 10 INJECTION, SOLUTION INTRAVENOUS at 07:41:00

## 2017-07-10 NOTE — INTERVAL H&P NOTE
Pre-op Diagnosis: Primary osteoarthritis left knee   New diagnosis of obstructive sleep apnea (JOHN) has been confirmed by sleep study since my consultation with this patient. JOHN  Is being managed with CPAP. Patient has her own CPAP for post op Rx.   The ab

## 2017-07-10 NOTE — ANESTHESIA PROCEDURE NOTES
Peripheral Block    Anesthesiologist:  Shanna Rios  Performed by:   Anesthesiologist  Patient Location:  PACU  Start Time:  7/10/2017 7:18 AM  End Time:  7/10/2017 7:28 AM  Site Identification: ultrasound guided, real time ultrasound guided, nerve stimula

## 2017-07-10 NOTE — ANESTHESIA POSTPROCEDURE EVALUATION
Patient: Jose A Richards    Procedure Summary     Date:  07/10/17 Room / Location:  [facility] OR  / [facility] OR    Anesthesia Start:  5621 Anesthesia Stop:  7665    Procedure:  KNEE TOTAL REPLACEMENT (Left Knee) Diagnosis:  (Primary osteoarthritis left

## 2017-07-10 NOTE — RESPIRATORY THERAPY NOTE
Sveta Richardson JOHN EVALUATION: Patient uses CPAP at home. Brought her own CPAP equipment. Will place oxygen in line with her CPAP tonight.

## 2017-07-10 NOTE — BRIEF OP NOTE
[address] UNIT  Brief Op Note     Bruno Bailiff Al Location: OR   CSN [id number] MRN [id number]   Admission Date 7/10/2017 Operation Date 7/10/2017   Attending Physician Nidia Mead MD Operating Physician Tab Mckenzie MD

## 2017-07-10 NOTE — DISCHARGE PLANNING
DANE met with the pt. At bedside. The pt. [place] with [place]  [place] with 4 stairs to enter. The pt. Reports being independent prior to admission with adls, ambulation, driving and working full time. The pt.  Has 3 dtrs, 2 in [facility] and 1 in

## 2017-07-10 NOTE — PLAN OF CARE
Diabetes/Glucose Control    • Glucose maintained within prescribed range Progressing    Glucose at dinner 214. Page to Dr. Cindy De La Garza for SS insulin orders. Will continue to monitor blood sugar.        DISCHARGE PLANNING    • Discharge to home or other facility Free from fall injury Progressing    Bed in the locked and lowest position with siderails x2. Call-light and personal belongings are within reach. Education provided on how to use the call-light to express needs, concerns, and ask for assistance.  Chantelle keane

## 2017-07-10 NOTE — PROGRESS NOTES
ORTHO SURG: PAIN CONTROLLED. PE: ALERT, NAD, MOTORS TO THE LEFT ANKLE / HINDFOOT /TOES ARE ALL 5/5, NORMAL LIGHT TOUCH TO ALL AREAS OF THE LEFT FOOT.

## 2017-07-11 LAB
BASOPHILS # BLD: 0 K/UL (ref 0–0.2)
BASOPHILS NFR BLD: 0 %
EOSINOPHIL # BLD: 0 K/UL (ref 0–0.7)
EOSINOPHIL NFR BLD: 0 %
ERYTHROCYTE [DISTWIDTH] IN BLOOD BY AUTOMATED COUNT: 14.7 % (ref 11–15)
GLUCOSE BLDC GLUCOMTR-MCNC: 142 MG/DL (ref 70–99)
GLUCOSE BLDC GLUCOMTR-MCNC: 146 MG/DL (ref 70–99)
GLUCOSE BLDC GLUCOMTR-MCNC: 147 MG/DL (ref 70–99)
GLUCOSE BLDC GLUCOMTR-MCNC: 157 MG/DL (ref 70–99)
HCT VFR BLD AUTO: 33.3 % (ref 35–48)
HGB BLD-MCNC: 11.2 G/DL (ref 12–16)
INR BLD: 1.1 (ref 0.9–1.2)
INR BLD: 1.3 (ref 0.9–1.2)
LYMPHOCYTES # BLD: 1.2 K/UL (ref 1–4)
LYMPHOCYTES NFR BLD: 11 %
MAGNESIUM SERPL-MCNC: 1.7 MG/DL (ref 1.8–2.5)
MCH RBC QN AUTO: 27.4 PG (ref 27–32)
MCHC RBC AUTO-ENTMCNC: 33.6 G/DL (ref 32–37)
MCV RBC AUTO: 81.6 FL (ref 80–100)
MONOCYTES # BLD: 1.3 K/UL (ref 0–1)
MONOCYTES NFR BLD: 12 %
NEUTROPHILS # BLD AUTO: 8.4 K/UL (ref 1.8–7.7)
NEUTROPHILS NFR BLD: 77 %
PLATELET # BLD AUTO: 265 K/UL (ref 140–400)
PMV BLD AUTO: 8.7 FL (ref 7.4–10.3)
PROTHROMBIN TIME: 13.7 SECONDS (ref 11.8–14.5)
PROTHROMBIN TIME: 15.6 SECONDS (ref 11.8–14.5)
RBC # BLD AUTO: 4.09 M/UL (ref 3.7–5.4)
WBC # BLD AUTO: 10.9 K/UL (ref 4–11)

## 2017-07-11 PROCEDURE — 99232 SBSQ HOSP IP/OBS MODERATE 35: CPT | Performed by: INTERNAL MEDICINE

## 2017-07-11 RX ORDER — HYDROCODONE BITARTRATE AND ACETAMINOPHEN 10; 325 MG/1; MG/1
1 TABLET ORAL
Status: DISCONTINUED | OUTPATIENT
Start: 2017-07-11 | End: 2017-07-12

## 2017-07-11 RX ORDER — WARFARIN SODIUM 5 MG/1
5 TABLET ORAL NIGHTLY
Status: DISCONTINUED | OUTPATIENT
Start: 2017-07-11 | End: 2017-07-14

## 2017-07-11 RX ORDER — HYDROCODONE BITARTRATE AND ACETAMINOPHEN 7.5; 325 MG/1; MG/1
1 TABLET ORAL
Status: DISCONTINUED | OUTPATIENT
Start: 2017-07-11 | End: 2017-07-12

## 2017-07-11 RX ORDER — HYDROCODONE BITARTRATE AND ACETAMINOPHEN 5; 325 MG/1; MG/1
1 TABLET ORAL
Status: DISCONTINUED | OUTPATIENT
Start: 2017-07-11 | End: 2017-07-12

## 2017-07-11 NOTE — PROGRESS NOTES
ORTHO SURG: PO#1  AFEB. AM LABS: INR = 1.1, WBC = 10,900, H/H = 11.2 / 33.3, PLATELETS = 892,506. DRAIN OUTPUT OVER PAST TWO 12 HOUR SHIFTS: 300 ML / 140 ML.  ML VOLUME WAS AUTOTRANSFUSED. PAIN CONTROLLED, WAS COMFORTABLE THROUGH THE NOC.  PE: AL

## 2017-07-11 NOTE — OCCUPATIONAL THERAPY NOTE
OCCUPATIONAL THERAPY EVALUATION - INPATIENT     Room Number: 914/431-C2  Evaluation Date: 7/11/2017  Type of Evaluation: Initial  Presenting Problem: L TKA    Physician Order: IP Consult to Occupational Therapy  Reason for Therapy: ADL/IADL Dysfunction and Diverticulitis    • High blood pressure    • Other and unspecified hyperlipidemia    • Sleep apnea    • Unspecified essential hypertension        Past Surgical History  Past Surgical History:  1996: BSO, OMENTECTOMY W/BETH      Comment: 1 ovary left  05/17/ COORDINATION  Gross Motor: WFL   Fine Motor: WFL     ADDITIONAL TESTS                                    NEUROLOGICAL FINDINGS  Neurological Findings: None                  ACTIVITIES OF DAILY LIVING ASSESSMENT  AM-PAC ‘6-Clicks’ Inpatient Daily Activi

## 2017-07-11 NOTE — RESPIRATORY THERAPY NOTE
RESPIRATORY THERAPY CPAP PROGRESS NOTE:    Patient is compliant with nightly CPAP: YES  Patient refused: NO  AutoCPAP in use: NO  Peak EPAP noted: 9 cmH20  CPAP interface: PT USING OWN TUBING  Patient tolerating: WELL    P recommends:  [address] [facility]

## 2017-07-11 NOTE — PHYSICAL THERAPY NOTE
PHYSICAL THERAPY KNEE TREATMENT NOTE - INPATIENT     Room Number: 428/428-A             Presenting Problem: L TKA    Problem List  Active Problems:    HTN (hypertension)    Dyslipidemia    DM type 2 (diabetes mellitus, type 2) ([facility] [address]    GERD (gastroesophag Little   How much help from another person does the patient currently need. ..   -   Moving to and from a bed to a chair (including a wheelchair)?: A Little   -   Need to walk in hospital room?: A Little   -   Climbing 3-5 steps with a railing?: A Little assistance level: modified independent   Goal #2  Current Status     Goal #3 Patient is able to ambulate 300 feet with assistive device at assistance level: modified independent    Goal #3   Current Status     Goal #4 Patient will negotiate 4 stairs/one cu

## 2017-07-11 NOTE — PROGRESS NOTES
[facility]    [facility] Note    [address] Patient Status:  Inpatient    3/5/1955 MRN [id number]   Location [facility] 4W/SW/SE Attending Marce Saab MD   Hosp Day # 1 PCP Theresa Edwards MD     Subjective:she feels ok, 06/23/2017   BUN 15 06/23/2017    06/23/2017   K 3.7 06/23/2017    06/23/2017   CO2 24 06/23/2017    (H) 06/23/2017   CA 9.8 06/23/2017   ALB 4.1 06/23/2017   ALKPHO 39 06/23/2017   BILT 0.4 06/23/2017   TP 6.7 06/23/2017   AST 27 06/23/

## 2017-07-11 NOTE — PHYSICAL THERAPY NOTE
PHYSICAL THERAPY KNEE EVALUATION - INPATIENT     Room Number: 428/428-A  Evaluation Date: 7/11/2017  Type of Evaluation: Initial  Physician Order: PT Eval and Treat    Presenting Problem: L TKA  Reason for Therapy: Mobility Dysfunction and Discharge Planni Diverticular disease    • Diverticulitis    • High blood pressure    • Other and unspecified hyperlipidemia    • Sleep apnea    • Unspecified essential hypertension        Past Surgical History  Past Surgical History:  1996: BSO, OMENTECTOMY W/BETH      Com sheets and blankets)?: A Little   -   Sitting down on and standing up from a chair with arms (e.g., wheelchair, bedside commode, etc.): A Little   -   Moving from lying on back to sitting on the side of the bed?: A Little   How much help from another perso Status    Goal #6 Patient independently performs home exercise program for ROM/strengthening per the instructions provided in preparation for discharge.    Goal #6  Current Status

## 2017-07-11 NOTE — PAYOR COMM NOTE
REF# [id number]    REQUESTING 1 ADDITIONAL DAY    Progress Notes signed by Gaby Girard MD at 7/11/2017  8:19 AM     Author: Gaby Girard MD Service: Orthopedics Author Type: Physician   Filed: 7/11/2017  8:19 AM Date of Service: 7/11/2017  7:59 AM Sta Patient is a 60year old female admitted 7/10/2017 for L TKA (Dr. Diedre Holstein). ABDELRAHMAN Riddle) cleared pt for OT evaluation. Pt reports \"4/10\" pain at rest and \"9/10\" pain with movement. She had a femoral nerve block. Pt reports intact sensation to left leg.   Sh 1985:   2014: COLONOSCOPY  No date: D & C      Comment: 1996: HYSTERECTOMY      Comment: Right ovary was left  No date: TONSILLECTOMY      Comment: When patient was 2 yrs old  07/10/2017: TOTAL KNEE REPLACEMENT Left      Co 07/11/17 0030 -- 76 -- -- 95 % -- Orren Cherry

## 2017-07-11 NOTE — H&P
[facility]    History and Physical    [address] Patient Status:  Inpatient    3/5/1955 MRN [id number]   Location [facility] 4W/SW/SE Attending Vern Deluca MD   Hosp Day # 0 PCP Tayla Serrano MD     Date:  7/10/2017  D Grandfather 61     colon ca   • Breast Cancer Maternal Aunt 79   • Breast Cancer Maternal Cousin Female 27   • Cancer Maternal Cousin Female 46     thyroid ca   • Macular degeneration Neg    • Glaucoma Neg      Social History:  Smoking status: Never Smoker source Oral, resp. rate 17, height 5' 3\" (1.6 m), weight 256 lb (116.1 kg), SpO2 95 %. Vitals reviewed. Constitutional: She is oriented to person, place, and time. She appears well-developed and well-nourished.    HENT:   Head: Normocephalic and atrau continue to monitor      Dyslipidemia  Continue cholesterol medications      DM type 2 (diabetes mellitus, type 2) (HCC)  We will continue sliding scale insulin for control of her diabetes.   Her last A1c was 7.3 which is quite good      GERD (gastroesophag

## 2017-07-11 NOTE — PLAN OF CARE
Diabetes/Glucose Control    • Glucose maintained within prescribed range Progressing        DISCHARGE PLANNING    • Discharge to home or other facility with appropriate resources Progressing        GASTROINTESTINAL - ADULT    • Minimal or absence of nausea

## 2017-07-12 LAB
ANION GAP SERPL CALC-SCNC: 6 MMOL/L (ref 0–18)
BASOPHILS # BLD: 0 K/UL (ref 0–0.2)
BASOPHILS NFR BLD: 0 %
BUN SERPL-MCNC: 10 MG/DL (ref 8–20)
BUN/CREAT SERPL: 12.7 (ref 10–20)
CALCIUM SERPL-MCNC: 8.8 MG/DL (ref 8.5–10.5)
CHLORIDE SERPL-SCNC: 103 MMOL/L (ref 95–110)
CO2 SERPL-SCNC: 28 MMOL/L (ref 22–32)
CREAT SERPL-MCNC: 0.79 MG/DL (ref 0.5–1.5)
EOSINOPHIL # BLD: 0.1 K/UL (ref 0–0.7)
EOSINOPHIL NFR BLD: 1 %
ERYTHROCYTE [DISTWIDTH] IN BLOOD BY AUTOMATED COUNT: 14.6 % (ref 11–15)
GLUCOSE BLDC GLUCOMTR-MCNC: 127 MG/DL (ref 70–99)
GLUCOSE BLDC GLUCOMTR-MCNC: 129 MG/DL (ref 70–99)
GLUCOSE BLDC GLUCOMTR-MCNC: 152 MG/DL (ref 70–99)
GLUCOSE BLDC GLUCOMTR-MCNC: 167 MG/DL (ref 70–99)
GLUCOSE SERPL-MCNC: 147 MG/DL (ref 70–99)
HCT VFR BLD AUTO: 31.4 % (ref 35–48)
HGB BLD-MCNC: 10.5 G/DL (ref 12–16)
INR BLD: 1.4 (ref 0.9–1.2)
IRON SATN MFR SERPL: 6 % (ref 15–50)
IRON SERPL-MCNC: 16 MCG/DL (ref 28–170)
LYMPHOCYTES # BLD: 1.3 K/UL (ref 1–4)
LYMPHOCYTES NFR BLD: 15 %
MCH RBC QN AUTO: 27.6 PG (ref 27–32)
MCHC RBC AUTO-ENTMCNC: 33.5 G/DL (ref 32–37)
MCV RBC AUTO: 82.2 FL (ref 80–100)
MONOCYTES # BLD: 1 K/UL (ref 0–1)
MONOCYTES NFR BLD: 12 %
NEUTROPHILS # BLD AUTO: 6.2 K/UL (ref 1.8–7.7)
NEUTROPHILS NFR BLD: 72 %
OSMOLALITY UR CALC.SUM OF ELEC: 286 MOSM/KG (ref 275–295)
PLATELET # BLD AUTO: 210 K/UL (ref 140–400)
PMV BLD AUTO: 8.8 FL (ref 7.4–10.3)
POTASSIUM SERPL-SCNC: 3.7 MMOL/L (ref 3.3–5.1)
PROTHROMBIN TIME: 16.3 SECONDS (ref 11.8–14.5)
RBC # BLD AUTO: 3.82 M/UL (ref 3.7–5.4)
SODIUM SERPL-SCNC: 137 MMOL/L (ref 136–144)
TIBC SERPL-MCNC: 269 MCG/DL (ref 228–428)
TRANSFERRIN SERPL-MCNC: 204 MG/DL (ref 192–382)
WBC # BLD AUTO: 8.7 K/UL (ref 4–11)

## 2017-07-12 PROCEDURE — 99232 SBSQ HOSP IP/OBS MODERATE 35: CPT | Performed by: INTERNAL MEDICINE

## 2017-07-12 RX ORDER — OXYCODONE AND ACETAMINOPHEN 10; 325 MG/1; MG/1
1 TABLET ORAL
Status: DISCONTINUED | OUTPATIENT
Start: 2017-07-12 | End: 2017-07-14

## 2017-07-12 RX ORDER — OXYCODONE AND ACETAMINOPHEN 7.5; 325 MG/1; MG/1
1 TABLET ORAL
Status: DISCONTINUED | OUTPATIENT
Start: 2017-07-12 | End: 2017-07-14

## 2017-07-12 RX ORDER — DOCUSATE SODIUM 100 MG/1
100 CAPSULE, LIQUID FILLED ORAL 2 TIMES DAILY
Status: DISCONTINUED | OUTPATIENT
Start: 2017-07-12 | End: 2017-07-14

## 2017-07-12 RX ORDER — OXYCODONE HYDROCHLORIDE AND ACETAMINOPHEN 5; 325 MG/1; MG/1
1 TABLET ORAL
Status: DISCONTINUED | OUTPATIENT
Start: 2017-07-12 | End: 2017-07-14

## 2017-07-12 NOTE — PROGRESS NOTES
[facility]    Progress Note    [address] Patient Status:  Inpatient    3/5/1955 MRN [id number]   Location [facility] 4W/SW/SE Attending Damari Gomez MD   Hosp Day # 2 PCP Kristine Nettles MD     Subjective:     Constit #2  Afebrile  WBC 8.7  Pain meds PRN- percocet ordered by Dr. Susana Cordero this am for better pain control  Wound drain removed  PT  OT  IS  Lovenox and Coumadin for DVT Prophylaxis  INR 1.4      JOHN on CPAP  Pt.  Uses own CPAP when sleeping    Disposition plan: H

## 2017-07-12 NOTE — PHYSICAL THERAPY NOTE
PHYSICAL THERAPY KNEE TREATMENT NOTE - INPATIENT     Room Number: 428/428-A             Presenting Problem: L TKA    Problem List  Active Problems:    HTN (hypertension)    Dyslipidemia    DM type 2 (diabetes mellitus, type 2) ([facility] [address]    GERD (gastroesophag (including a wheelchair)?: A Little   -   Need to walk in hospital room?: A Little   -   Climbing 3-5 steps with a railing?: A Little    AM-PAC Score:  Raw Score: 18   PT Approx Degree of Impairment Score: 46.58%   Standardized Score (AM-PAC Scale): 43.63 Patient independently performs home exercise program for ROM/strengthening per the instructions provided in preparation for discharge.    Goal #6  Current Status In progress

## 2017-07-12 NOTE — PLAN OF CARE
Diabetes/Glucose Control    • Glucose maintained within prescribed range Progressing       Accucheck ACHS with ISS coverage.     DISCHARGE PLANNING    • Discharge to home or other facility with appropriate resources Progressing       Plan is home with hhc w

## 2017-07-12 NOTE — PROGRESS NOTES
[facility]    Progress Note    [address] Patient Status:  Inpatient    3/5/1955 MRN [id number]   Location [facility] 4W/SW/SE Attending Jean-Pierre Arizmendi MD   Hosp Day # 2 PCP Maryjo Jaffe MD     Subjective:working with type 2 (diabetes mellitus, type 2) ([facility] [address]  continue with sliding scale insulin, hypoglycemia protocol     leila - cpap at night      Results:     Lab Results  Component Value Date   WBC 8.7 07/12/2017   HGB 10.5 (L) 07/12/2017   HCT 31.4 (L) 07/12/2017   PLT

## 2017-07-12 NOTE — RESPIRATORY THERAPY NOTE
RESPIRATORY THERAPY CPAP PROGRESS NOTE:    Patient is compliant with nightly CPAP: Yes  Patient refused: No  AutoCPAP in use: No  Peak EPAP noted: 9 cmH20  CPAP interface: Nasal pillows  Patient tolerating: Well    RCP recommends to continue CPAP therapy o

## 2017-07-12 NOTE — PROGRESS NOTES
ORTHO SURG:  PO#2  PATIENT SEEN IN 0630 - 0700 TIMEFRAME THIS AM.                            AFEB. AM LABS: INR =1.4, WBC = 8,700, H/H = 10.5 / 31.4, PLATELETS = 997,397.                             PATIENT REPORTS SUBOPTIMAL PAIN MGT. FOR THE PAST 12 - 24

## 2017-07-13 LAB
ANION GAP SERPL CALC-SCNC: 7 MMOL/L (ref 0–18)
BASOPHILS # BLD: 0 K/UL (ref 0–0.2)
BASOPHILS NFR BLD: 1 %
BUN SERPL-MCNC: 12 MG/DL (ref 8–20)
BUN/CREAT SERPL: 14.8 (ref 10–20)
CALCIUM SERPL-MCNC: 8.6 MG/DL (ref 8.5–10.5)
CHLORIDE SERPL-SCNC: 101 MMOL/L (ref 95–110)
CO2 SERPL-SCNC: 29 MMOL/L (ref 22–32)
CREAT SERPL-MCNC: 0.81 MG/DL (ref 0.5–1.5)
EOSINOPHIL # BLD: 0.3 K/UL (ref 0–0.7)
EOSINOPHIL NFR BLD: 4 %
ERYTHROCYTE [DISTWIDTH] IN BLOOD BY AUTOMATED COUNT: 14.8 % (ref 11–15)
GLUCOSE BLDC GLUCOMTR-MCNC: 110 MG/DL (ref 70–99)
GLUCOSE BLDC GLUCOMTR-MCNC: 119 MG/DL (ref 70–99)
GLUCOSE BLDC GLUCOMTR-MCNC: 122 MG/DL (ref 70–99)
GLUCOSE BLDC GLUCOMTR-MCNC: 141 MG/DL (ref 70–99)
GLUCOSE SERPL-MCNC: 130 MG/DL (ref 70–99)
HCT VFR BLD AUTO: 29.5 % (ref 35–48)
HGB BLD-MCNC: 9.9 G/DL (ref 12–16)
INR BLD: 1.6 (ref 0.9–1.2)
LYMPHOCYTES # BLD: 1.1 K/UL (ref 1–4)
LYMPHOCYTES NFR BLD: 15 %
MCH RBC QN AUTO: 27.5 PG (ref 27–32)
MCHC RBC AUTO-ENTMCNC: 33.6 G/DL (ref 32–37)
MCV RBC AUTO: 81.8 FL (ref 80–100)
MONOCYTES # BLD: 1 K/UL (ref 0–1)
MONOCYTES NFR BLD: 13 %
NEUTROPHILS # BLD AUTO: 5.2 K/UL (ref 1.8–7.7)
NEUTROPHILS NFR BLD: 68 %
OSMOLALITY UR CALC.SUM OF ELEC: 286 MOSM/KG (ref 275–295)
PLATELET # BLD AUTO: 211 K/UL (ref 140–400)
PMV BLD AUTO: 9 FL (ref 7.4–10.3)
POTASSIUM SERPL-SCNC: 3.7 MMOL/L (ref 3.3–5.1)
PROTHROMBIN TIME: 18.5 SECONDS (ref 11.8–14.5)
RBC # BLD AUTO: 3.61 M/UL (ref 3.7–5.4)
SODIUM SERPL-SCNC: 137 MMOL/L (ref 136–144)
WBC # BLD AUTO: 7.7 K/UL (ref 4–11)

## 2017-07-13 PROCEDURE — 99232 SBSQ HOSP IP/OBS MODERATE 35: CPT | Performed by: INTERNAL MEDICINE

## 2017-07-13 RX ORDER — POLYETHYLENE GLYCOL 3350 17 G/17G
17 POWDER, FOR SOLUTION ORAL DAILY PRN
Status: DISCONTINUED | OUTPATIENT
Start: 2017-07-13 | End: 2017-07-14

## 2017-07-13 RX ORDER — POTASSIUM CHLORIDE 20 MEQ/1
40 TABLET, EXTENDED RELEASE ORAL ONCE
Status: COMPLETED | OUTPATIENT
Start: 2017-07-13 | End: 2017-07-13

## 2017-07-13 RX ORDER — MELATONIN
325
Status: DISCONTINUED | OUTPATIENT
Start: 2017-07-13 | End: 2017-07-14

## 2017-07-13 NOTE — PROGRESS NOTES
[facility]    Progress Note    [address] Patient Status:  Inpatient    3/5/1955 MRN [id number]   Location [facility] 4W/SW/SE Attending Esa Springer MD   Hosp Day # 3 PCP Na Manzano MD     Subjective:     Constit disease)  Protonix      Primary osteoarthritis of left knee  S/p L TKA by Dr. Michelle Ware on 7/10/17  POD #3  Afebrile  WBC 7.7  Pain meds PRN  Wound drain removed  PT  OT  IS  Neuro checks  Lovenox and Coumadin for DVT Prophylaxis  SCDs  INR 1.6      JOHN on CPA

## 2017-07-13 NOTE — PHYSICAL THERAPY NOTE
PHYSICAL THERAPY KNEE TREATMENT NOTE - INPATIENT     Room Number: 428/428-A             Presenting Problem: L TKA    Problem List  Active Problems:    HTN (hypertension)    Dyslipidemia    DM type 2 (diabetes mellitus, type 2) ([facility] [address]    GERD (gastroesophag patient currently need. ..   -   Moving to and from a bed to a chair (including a wheelchair)?: A Little   -   Need to walk in hospital room?: A Little   -   Climbing 3-5 steps with a railing?: A Little    AM-PAC Score:  Raw Score: 18   PT Approx Degree of degrees extension to 95 degrees flexion     Goal #5   Current Status 70 deg flex   Goal #6 Patient independently performs home exercise program for ROM/strengthening per the instructions provided in preparation for discharge.    Goal #6  Current Status In p

## 2017-07-13 NOTE — RESPIRATORY THERAPY NOTE
RESPIRATORY THERAPY CPAP PROGRESS NOTE:     Patient is compliant with nightly CPAP: Yes  Patient refused: No  AutoCPAP in use: No  Peak EPAP noted: 9 cmH20  CPAP interface: Nasal pillows  Patient tolerating: Well     RCP recommends to continue CPAP therapy

## 2017-07-13 NOTE — PLAN OF CARE
Diabetes/Glucose Control    • Glucose maintained within prescribed range Progressing        DISCHARGE PLANNING    • Discharge to home or other facility with appropriate resources Progressing        MUSCULOSKELETAL - ADULT    • Return mobility to safest lev

## 2017-07-13 NOTE — PROGRESS NOTES
[facility]    Progress Note    [address] Patient Status:  Inpatient    3/5/1955 MRN [id number]   Location [facility] 4W/SW/SE Attending Ninoska Fitch MD   Hosp Day # 3 PCP Priscilla Huffman MD     Subjective:feel better Results  Component Value Date   WBC 7.7 07/13/2017   HGB 9.9 (L) 07/13/2017   HCT 29.5 (L) 07/13/2017    07/13/2017   CREATSERUM 0.81 07/13/2017   BUN 12 07/13/2017    07/13/2017   K 3.7 07/13/2017    07/13/2017   CO2 29 07/13/2017   GLU

## 2017-07-13 NOTE — OCCUPATIONAL THERAPY NOTE
OCCUPATIONAL THERAPY TREATMENT NOTE - INPATIENT     Room Number: 428/428-A         Presenting Problem: L TKA    Problem List  Active Problems:    HTN (hypertension)    Dyslipidemia    DM type 2 (diabetes mellitus, type 2) (HCC)    GERD (gastroesophageal re air    ACTIVITIES OF DAILY LIVING ASSESSMENT  AM-PAC ‘6-Clicks’ Inpatient Daily Activity Short Form  How much help from another person does the patient currently need…  -   Putting on and taking off regular lower body clothing?: A Little  -   Bathing (incl

## 2017-07-13 NOTE — PLAN OF CARE
GASTROINTESTINAL - ADULT    • Minimal or absence of nausea and vomiting Completed    Denies n/v    GENITOURINARY - ADULT    • Absence of urinary retention Completed    Voiding freely      Diabetes/Glucose Control    • Glucose maintained within prescribed r

## 2017-07-14 VITALS
WEIGHT: 256 LBS | HEART RATE: 81 BPM | DIASTOLIC BLOOD PRESSURE: 44 MMHG | SYSTOLIC BLOOD PRESSURE: 126 MMHG | OXYGEN SATURATION: 99 % | BODY MASS INDEX: 45.36 KG/M2 | TEMPERATURE: 98 F | RESPIRATION RATE: 18 BRPM | HEIGHT: 63 IN

## 2017-07-14 LAB
BASOPHILS # BLD: 0.1 K/UL (ref 0–0.2)
BASOPHILS NFR BLD: 1 %
EOSINOPHIL # BLD: 0.3 K/UL (ref 0–0.7)
EOSINOPHIL NFR BLD: 4 %
ERYTHROCYTE [DISTWIDTH] IN BLOOD BY AUTOMATED COUNT: 14.7 % (ref 11–15)
GLUCOSE BLDC GLUCOMTR-MCNC: 103 MG/DL (ref 70–99)
GLUCOSE BLDC GLUCOMTR-MCNC: 144 MG/DL (ref 70–99)
HCT VFR BLD AUTO: 30.9 % (ref 35–48)
HEMOCCULT STL QL: NEGATIVE
HGB BLD-MCNC: 10.2 G/DL (ref 12–16)
INR BLD: 1.7 (ref 0.9–1.2)
LYMPHOCYTES # BLD: 1.2 K/UL (ref 1–4)
LYMPHOCYTES NFR BLD: 16 %
MCH RBC QN AUTO: 27.1 PG (ref 27–32)
MCHC RBC AUTO-ENTMCNC: 33 G/DL (ref 32–37)
MCV RBC AUTO: 82.1 FL (ref 80–100)
MONOCYTES # BLD: 1 K/UL (ref 0–1)
MONOCYTES NFR BLD: 14 %
NEUTROPHILS # BLD AUTO: 4.9 K/UL (ref 1.8–7.7)
NEUTROPHILS NFR BLD: 65 %
PLATELET # BLD AUTO: 255 K/UL (ref 140–400)
PMV BLD AUTO: 8.2 FL (ref 7.4–10.3)
POTASSIUM SERPL-SCNC: 4.5 MMOL/L (ref 3.3–5.1)
PROTHROMBIN TIME: 19.7 SECONDS (ref 11.8–14.5)
RBC # BLD AUTO: 3.77 M/UL (ref 3.7–5.4)
WBC # BLD AUTO: 7.5 K/UL (ref 4–11)

## 2017-07-14 PROCEDURE — 99239 HOSP IP/OBS DSCHRG MGMT >30: CPT | Performed by: INTERNAL MEDICINE

## 2017-07-14 RX ORDER — MELATONIN
325
Qty: 30 TABLET | Refills: 0 | Status: SHIPPED | OUTPATIENT
Start: 2017-07-14 | End: 2017-10-24

## 2017-07-14 RX ORDER — ENOXAPARIN SODIUM 100 MG/ML
30 INJECTION SUBCUTANEOUS 2 TIMES DAILY
Qty: 4 SYRINGE | Refills: 0 | Status: SHIPPED | OUTPATIENT
Start: 2017-07-14 | End: 2017-07-21

## 2017-07-14 RX ORDER — OXYCODONE AND ACETAMINOPHEN 7.5; 325 MG/1; MG/1
1 TABLET ORAL
Qty: 30 TABLET | Refills: 0 | Status: SHIPPED | OUTPATIENT
Start: 2017-07-14 | End: 2017-10-24

## 2017-07-14 RX ORDER — WARFARIN SODIUM 5 MG/1
5 TABLET ORAL NIGHTLY
Qty: 26 TABLET | Refills: 0 | Status: SHIPPED | OUTPATIENT
Start: 2017-07-14 | End: 2017-08-14

## 2017-07-14 RX ORDER — PSEUDOEPHEDRINE HCL 30 MG
100 TABLET ORAL 2 TIMES DAILY
Qty: 60 CAPSULE | Refills: 0 | Status: SHIPPED | OUTPATIENT
Start: 2017-07-14 | End: 2017-10-24

## 2017-07-14 NOTE — DISCHARGE PLANNING
Plan is for the pt. To return home with [facility]. Referral has been made and [facility] is aware of nursing orders. [facility] will continue to watch for PT orders for home per Dr. Iqra Pope.      MD orders for PT have been sent to [facility]

## 2017-07-14 NOTE — PROGRESS NOTES
[facility]    Progress Note    [address] Patient Status:  Inpatient    3/5/1955 MRN [id number]   [facility] 4W/SW/SE Attending Joseph Lewis MD   Hosp Day # 4 PCP Darlene Kumar MD     Subjective:     Constit Dr. Anabela Ang on 7/10/17  POD #4  Afebrile  WBC 7.5  Pain meds PRN- percocet ordered by Dr. Anabela Ang this am for better pain control  Wound drain removed  PT  OT  IS  Lovenox and Coumadin for DVT Prophylaxis  INR      JOHN on CPAP  Pt.  Uses own CPAP when sleeping

## 2017-07-14 NOTE — PROGRESS NOTES
[facility]    Brief Note    [address] Patient Status:  Inpatient    3/5/1955 MRN [id number]   Location [facility] 4W/SW/SE Attending Ninoska Fitch MD   Hosp Day # 4 PCP Christian Qiu MD       Brief Note:   Dr. Elvira Bahena

## 2017-07-14 NOTE — PHYSICAL THERAPY NOTE
PHYSICAL THERAPY KNEE TREATMENT NOTE - INPATIENT     Room Number: 428/428-A             Presenting Problem: L TKA    Problem List  Active Problems:    HTN (hypertension)    Dyslipidemia    DM type 2 (diabetes mellitus, type 2) ([facility] [address]    GERD (gastroesophag does the patient currently need. ..   -   Moving to and from a bed to a chair (including a wheelchair)?: None   -   Need to walk in hospital room?: None   -   Climbing 3-5 steps with a railing?: A Little    AM-PAC Score:  Raw Score: 22   PT Approx Degree of extension to 95 degrees flexion     Goal #5   Current Status 90 deg flex   Goal #6 Patient independently performs home exercise program for ROM/strengthening per the instructions provided in preparation for discharge.    Goal #6  Current Status In progress

## 2017-07-14 NOTE — PLAN OF CARE
Diabetes/Glucose Control    • Glucose maintained within prescribed range Adequate for Discharge        DISCHARGE PLANNING    • Discharge to home or other facility with appropriate resources Adequate for Discharge        MUSCULOSKELETAL - ADULT    • Return

## 2017-07-14 NOTE — DISCHARGE SUMMARY
Discharge summary  DISCHARGE SUMMARY     [address] Patient Status:  Inpatient    3/5/1955 MRN [id number]   Location [facility] 4W/SW/SE Attending Romaine Zamora MD   Hosp Day # 4 PCP Kristie Mcmanus MD     Date of Admission: 7/10/2017  D sodium 100 MG Caps  Commonly known as:  COLACE      Take 100 mg by mouth 2 (two) times daily.    Quantity:  60 capsule  Refills:  0     ferrous sulfate 325 (65 FE) MG Tbec      Take 1 tablet (325 mg total) by mouth daily with breakfast.   Quantity:  30 tabl MG Caps  · ferrous sulfate 325 (65 FE) MG Tbec         Discharge Plan:  Discharge Condition: Stable    Current Discharge Medication List    New Orders    ferrous sulfate 325 (65 FE) MG Oral Tab EC  Take 1 tablet (325 mg total) by mouth daily with breakfast

## 2017-07-14 NOTE — PROGRESS NOTES
ORTHO SURG:  PO#4  AFEB . AM LABS: INR = 1.7, WBC = 7,500, H/H = 10.2 / 30.9, PLATELETS = 588,490. PAIN CONTROLLED WITH PERCOCET 7.5 / 325. PATIENT REPORTS LEFT KNEE FLEXION TO 90 DEGREES THIS AM WITH ASSIST.  PE: ALERT, NAD, LEFT KNEE WOUND AND DRAIN SITE

## 2017-07-14 NOTE — PAYOR COMM NOTE
REF# [id number]    Progress Notes signed by Toamsa Hernández NP at 7/14/2017  9:44 AM     Author: Tomasa Hernández NP Service: Internal Medicine Author Type: Nurse Practitioner   Filed: 7/14/2017  9:44 AM Date of Service: 7/14/2017  8:00 AM Status: [place] Assessment and Plan:     HTN (hypertension)  129/47  Lisinopril/HCTZ  Monitor       Dyslipidemia  Lipitor  Pecola Bard       DM type 2 (diabetes mellitus, type 2) (HCC)  Hgb A1c 7.2 on 5/30/17  Glucophage  Novolog  ACHS accu checks  Hypoglycemia protocol    Filed: 7/13/2017 11:11 AM Date of Service: 7/13/2017  9:49 AM Status: Signed   : Dax Masters MD (Physician)   []Manual[]Template  []Copied     [facility]     Progress Note           [address] Patient Status:  Inpatient   DM type 2 (diabetes mellitus, type 2) ([facility] [address]  continue with sliding scale insulin, hypoglycemia protocol     leila - cpap at night       Continue ptot     Results:      Lab Results  Component Value Date   WBC 7.7 07/13/2017   HGB 9.9 (L) 07/13/2017   HCT 29

## 2017-07-15 ENCOUNTER — TELEPHONE (OUTPATIENT)
Dept: MEDSURG UNIT | Facility: HOSPITAL | Age: 62
End: 2017-07-15

## 2017-07-17 ENCOUNTER — PATIENT OUTREACH (OUTPATIENT)
Dept: INTERNAL MEDICINE CLINIC | Facility: CLINIC | Age: 62
End: 2017-07-17

## 2017-07-17 ENCOUNTER — TELEPHONE (OUTPATIENT)
Dept: INTERNAL MEDICINE CLINIC | Facility: CLINIC | Age: 62
End: 2017-07-17

## 2017-07-17 NOTE — TELEPHONE ENCOUNTER
Amol Meyers from [facility] calling with patients INR 2.6 Saturday coumadin 2.5 mg and Sunday coumadin 5 mg.

## 2017-07-18 NOTE — TELEPHONE ENCOUNTER
Spoke with the patient during [facility]. She stated that Lovenox was discontinued on Saturday. Stated that she would continue with 2.5 mg Coumadin daily, per MD instruction, and understood that INR is to be taken on Thursday.  Informed her that I would c

## 2017-07-18 NOTE — PROGRESS NOTES
Initial Post Discharge Follow Up   Discharge Date: 7/14/17  Contact Date: 7/17/2017    Consent Verification:  Assessment Completed With: Patient  HIPAA Verified? Yes    1. Tell me why you were in the hospital?  I went in for a left knee replacement.     2. 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 Disp: 90 tablet Rfl: 1   atorvastatin 20 MG Oral Tab Take 1 tablet (20 mg total) CPM unit without any issues. She has no complaints of fever. Her fasting blood sugar today was 130. After performing chart review, I noticed a telephone encounter from yesterday, 7/17, from the [facility] RN reporting INR results to PCP. INR was 2.6.  Per MD order,

## 2017-07-20 ENCOUNTER — ANTI-COAG VISIT (OUTPATIENT)
Dept: INTERNAL MEDICINE CLINIC | Facility: CLINIC | Age: 62
End: 2017-07-20

## 2017-07-20 DIAGNOSIS — Z96.652 S/P TOTAL KNEE REPLACEMENT, LEFT: Primary | ICD-10-CM

## 2017-07-20 LAB
INR: 2.3 (ref 0.8–1.2)
INR: 2.3 (ref 0.8–1.2)

## 2017-07-20 NOTE — PROGRESS NOTES
Spoke with [address]. Informed to take 2.5 mg everyday and to recheck in 1 week. [facility] verbalized understanding, dosage read back accurately.

## 2017-07-21 ENCOUNTER — OFFICE VISIT (OUTPATIENT)
Dept: INTERNAL MEDICINE CLINIC | Facility: CLINIC | Age: 62
End: 2017-07-21

## 2017-07-21 VITALS
SYSTOLIC BLOOD PRESSURE: 115 MMHG | DIASTOLIC BLOOD PRESSURE: 76 MMHG | WEIGHT: 256.63 LBS | TEMPERATURE: 99 F | BODY MASS INDEX: 45.47 KG/M2 | HEART RATE: 76 BPM | HEIGHT: 63 IN

## 2017-07-21 DIAGNOSIS — Z96.652 HISTORY OF TOTAL KNEE ARTHROPLASTY, LEFT: Primary | ICD-10-CM

## 2017-07-21 PROCEDURE — 99495 TRANSJ CARE MGMT MOD F2F 14D: CPT | Performed by: INTERNAL MEDICINE

## 2017-07-21 NOTE — PROGRESS NOTES
HPI:    Julissa Vick is a 60year old female here today for hospital follow up.    She was discharged from Inpatient hospital, [facility]  to Home   Admission Date: 7/10/17   Discharge Date: 7/14/17  Hospital Discharge [address] knee arthr by mouth nightly. oxycodone-acetaminophen 7.5-325 MG Oral Tab Take 1 tablet by mouth every 4 to 6 hours as needed for Pain. Vitamin B-12 1000 MCG Oral Tab Take 1,000 mcg by mouth daily.    MetFORMIN HCl 1000 MG Oral Tab Take 1 tablet (1,000 mg total) by grandfather; Cancer (age of onset: 70) in her maternal aunt; Cancer (age of onset: 68) in her maternal grandfather; Diabetes in her father and mother; Heart Disorder in her father. She  reports that she has never smoked.  She has never used smokeless toba all the way to leg - non tender   EXTREMITIES: no cyanosis, clubbing or edema  NEURO: Oriented times three, cranial nerves are intact, motor and sensory are grossly intact    ASSESSMENT/ PLAN:   S/p left knee arthroplasty - continue with physical therapy ,

## 2017-07-21 NOTE — PATIENT INSTRUCTIONS
After Knee Replacement: Controlling Swelling  Swelling is common after total knee replacement. It may be worse after exercise. To help control swelling, follow the steps below.   Ice your knee  Wrap an ice pack or bag of frozen peas in a thin cloth, then

## 2017-07-24 ENCOUNTER — ANTI-COAG VISIT (OUTPATIENT)
Dept: INTERNAL MEDICINE CLINIC | Facility: CLINIC | Age: 62
End: 2017-07-24

## 2017-07-24 ENCOUNTER — TELEPHONE (OUTPATIENT)
Dept: INTERNAL MEDICINE CLINIC | Facility: CLINIC | Age: 62
End: 2017-07-24

## 2017-07-24 DIAGNOSIS — R79.1 ABNORMAL INR: Primary | ICD-10-CM

## 2017-07-24 LAB — INR: 1.3 (ref 0.8–1.2)

## 2017-07-24 NOTE — PROGRESS NOTES
Coumadin dosing instructions were discussed with [address] and patient. Dosing instructions were read back to me accurately. Patient to have INR recheck in a week, Aimee Rodriguez will call office with results.

## 2017-07-24 NOTE — TELEPHONE ENCOUNTER
eWi Jolley  [phone] [facility] is calling in an INR  1.3 she is currently taking  2.5 mg daily

## 2017-07-31 ENCOUNTER — ANTI-COAG VISIT (OUTPATIENT)
Dept: INTERNAL MEDICINE CLINIC | Facility: CLINIC | Age: 62
End: 2017-07-31

## 2017-07-31 ENCOUNTER — OFFICE VISIT (OUTPATIENT)
Dept: PHYSICAL THERAPY | Facility: HOSPITAL | Age: 62
End: 2017-07-31
Attending: ORTHOPAEDIC SURGERY
Payer: COMMERCIAL

## 2017-07-31 DIAGNOSIS — Z82.69 FH: TOTAL KNEE REPLACEMENT: ICD-10-CM

## 2017-07-31 DIAGNOSIS — Z96.652 S/P TOTAL KNEE REPLACEMENT, LEFT: Primary | ICD-10-CM

## 2017-07-31 LAB — INR: 1.1 (ref 0.8–1.2)

## 2017-07-31 PROCEDURE — 85610 PROTHROMBIN TIME: CPT | Performed by: INTERNAL MEDICINE

## 2017-07-31 PROCEDURE — 97162 PT EVAL MOD COMPLEX 30 MIN: CPT

## 2017-07-31 PROCEDURE — 97161 PT EVAL LOW COMPLEX 20 MIN: CPT

## 2017-07-31 PROCEDURE — 36416 COLLJ CAPILLARY BLOOD SPEC: CPT | Performed by: INTERNAL MEDICINE

## 2017-07-31 PROCEDURE — 97140 MANUAL THERAPY 1/> REGIONS: CPT

## 2017-07-31 NOTE — PROGRESS NOTES
LOWER EXTREMITY EVALUATION:   Referring Physician: Dr. Partha Black  Diagnosis: L total knee replacement (E24.28)     Date of Onset: 7/10/17 Date of Service: 7/31/2017     PATIENT SUMMARY   Caroline Stanton is a 60year old y/o female who presents to therapy t ambulating with SC and demonstrating a moderate L antalgic gait, decreased toe off, and decreased knee ROM throughout. Reports current pain 0/10, but states pain increases to a 6/10 with activity.  Current functional limitations include, but not limited to; Observation: moderate global swelling present as well as bilat pitting edema. Gait: mild L antalgic gait with use of SC.  Decreased toe off/knee flexion in swing/knee extension in stance  Palpation: mild TTP noted globally around knee joint  Sensation return to previous level of function.    2. Patient to demonstrate improved L knee flexion AROM to 120 degrees in order to negotiate stairs, transfer in/out of car/chair/bed, and transfer in/out of shower without difficulty.    3. Patient to increase global

## 2017-07-31 NOTE — PROGRESS NOTES
Coumadin dosing instructions were discussed with patient. Dosing instructions were read back to me accurately. Patient to return in 8/3/17 for recheck of INR.  Appointment scheduled

## 2017-08-01 ENCOUNTER — OFFICE VISIT (OUTPATIENT)
Dept: PHYSICAL THERAPY | Facility: HOSPITAL | Age: 62
End: 2017-08-01
Attending: ORTHOPAEDIC SURGERY
Payer: COMMERCIAL

## 2017-08-01 PROCEDURE — 97140 MANUAL THERAPY 1/> REGIONS: CPT

## 2017-08-01 PROCEDURE — 97110 THERAPEUTIC EXERCISES: CPT

## 2017-08-01 NOTE — PROGRESS NOTES
Dx: L total knee replacement (S52.08)           Authorized # of Visits/Insurance:  [employer] PPO  Script Dates: 7/31-9/11          Next MD visit: none scheduled  Referring MD: Marce Yap  Fall Risk:  standard         Precautions: n/a           Medication Jaja Soliz flexion AROM to 120 degrees in order to negotiate stairs, transfer in/out of car/chair/bed, and transfer in/out of shower without difficulty.    3. Patient to increase global LE strength 1/2 MMT in order to ambulate community distances and tolerate standing

## 2017-08-03 ENCOUNTER — ANTI-COAG VISIT (OUTPATIENT)
Dept: INTERNAL MEDICINE CLINIC | Facility: CLINIC | Age: 62
End: 2017-08-03

## 2017-08-03 ENCOUNTER — OFFICE VISIT (OUTPATIENT)
Dept: PHYSICAL THERAPY | Facility: HOSPITAL | Age: 62
End: 2017-08-03
Attending: ORTHOPAEDIC SURGERY
Payer: COMMERCIAL

## 2017-08-03 DIAGNOSIS — R79.1 ABNORMAL INR: Primary | ICD-10-CM

## 2017-08-03 LAB — INR: 1.6 (ref 0.8–1.2)

## 2017-08-03 PROCEDURE — 36416 COLLJ CAPILLARY BLOOD SPEC: CPT | Performed by: INTERNAL MEDICINE

## 2017-08-03 PROCEDURE — 85610 PROTHROMBIN TIME: CPT | Performed by: INTERNAL MEDICINE

## 2017-08-03 PROCEDURE — 97110 THERAPEUTIC EXERCISES: CPT

## 2017-08-03 PROCEDURE — 97140 MANUAL THERAPY 1/> REGIONS: CPT

## 2017-08-03 NOTE — PROGRESS NOTES
Dx: L total knee replacement (X96.80)           Authorized # of Visits/Insurance:  [employer] PPO  Script Dates: 7/31-9/11          Next MD visit: none scheduled  Referring MD: Fransisca Yap  Fall Risk:  standard         Precautions: n/a           Medication Kevon Gonzales swing phase. Patient was able to ambulate short distances without use of SC without increased pain. Incision healing is good with minor caution at proximal aspect due to appearance of delayed healing.  Educated patient regarding signs of infection and to no

## 2017-08-03 NOTE — PROGRESS NOTES
Coumadin dosing instructions were discussed with patient. Dosing instructions were read back to me accurately. Patient to return on 08/07/2017 for recheck of INR. Appointment scheduled.

## 2017-08-07 ENCOUNTER — ANTI-COAG VISIT (OUTPATIENT)
Dept: INTERNAL MEDICINE CLINIC | Facility: CLINIC | Age: 62
End: 2017-08-07

## 2017-08-07 DIAGNOSIS — R79.1 ABNORMAL INR: Primary | ICD-10-CM

## 2017-08-07 LAB — INR: 1.9 (ref 0.8–1.2)

## 2017-08-07 PROCEDURE — 36416 COLLJ CAPILLARY BLOOD SPEC: CPT | Performed by: INTERNAL MEDICINE

## 2017-08-07 PROCEDURE — 85610 PROTHROMBIN TIME: CPT | Performed by: INTERNAL MEDICINE

## 2017-08-07 NOTE — PROGRESS NOTES
Coumadin dosing instructions were discussed with pt. Dosing instructions were read back to me accurately. Pt to return on 8/14/17 for next INR check. Pt verbalized understanding of whole message and had no further questions at this time.   Call transferred

## 2017-08-08 ENCOUNTER — OFFICE VISIT (OUTPATIENT)
Dept: PHYSICAL THERAPY | Facility: HOSPITAL | Age: 62
End: 2017-08-08
Attending: ORTHOPAEDIC SURGERY
Payer: COMMERCIAL

## 2017-08-08 PROCEDURE — 97110 THERAPEUTIC EXERCISES: CPT

## 2017-08-08 PROCEDURE — 97140 MANUAL THERAPY 1/> REGIONS: CPT

## 2017-08-08 NOTE — PROGRESS NOTES
Dx: L total knee replacement (U56.05)           Authorized # of Visits/Insurance:  [employer] PPO  Script Dates: 7/31-9/11          Next MD visit: 8/8/17 Referring MD: Marce Yap  Fall Risk:  standard         Precautions: n/a           Medication Changes since Passive stretching of L knee: flexion off EOB, ext in supine x10 PROM: L knee flexion off EOB, knee ext supine x10 min       Neuro-Re-Education        Therapeutic Activity/Self-Care          AROM  Knee Flexion:   8/3/17: 85  Knee Extension:  8/3/17: -10  A

## 2017-08-10 ENCOUNTER — OFFICE VISIT (OUTPATIENT)
Dept: PHYSICAL THERAPY | Facility: HOSPITAL | Age: 62
End: 2017-08-10
Attending: ORTHOPAEDIC SURGERY
Payer: COMMERCIAL

## 2017-08-10 PROCEDURE — 97110 THERAPEUTIC EXERCISES: CPT

## 2017-08-10 PROCEDURE — 97140 MANUAL THERAPY 1/> REGIONS: CPT

## 2017-08-10 NOTE — PROGRESS NOTES
Dx: L total knee replacement (S12.10)           Authorized # of Visits/Insurance:  [facility] PPO  Script Dates: 7/31-9/11          Next MD visit: 8/23/17 Referring MD: No ref.  provider found  Fall Risk:  standard         Precautions: n/a           Medication Ch Therapeutic Activity/Self-Care          AROM  Knee Flexion:   8/3/: 85  8/10: 89 off EOB, 92 supine  Knee Extension:  8/3/: -10  8/10: -8  Assessment: Focused treatment on ROM progression.  Patient was able to achieve 90 degrees of flexion actively seate

## 2017-08-11 ENCOUNTER — APPOINTMENT (OUTPATIENT)
Dept: PHYSICAL THERAPY | Facility: HOSPITAL | Age: 62
End: 2017-08-11
Attending: ORTHOPAEDIC SURGERY
Payer: COMMERCIAL

## 2017-08-14 ENCOUNTER — OFFICE VISIT (OUTPATIENT)
Dept: PHYSICAL THERAPY | Facility: HOSPITAL | Age: 62
End: 2017-08-14
Attending: ORTHOPAEDIC SURGERY
Payer: COMMERCIAL

## 2017-08-14 ENCOUNTER — ANTI-COAG VISIT (OUTPATIENT)
Dept: INTERNAL MEDICINE CLINIC | Facility: CLINIC | Age: 62
End: 2017-08-14

## 2017-08-14 DIAGNOSIS — Z96.652 S/P TOTAL KNEE REPLACEMENT, LEFT: Primary | ICD-10-CM

## 2017-08-14 LAB — INR: 1.4 (ref 0.8–1.2)

## 2017-08-14 PROCEDURE — 85610 PROTHROMBIN TIME: CPT | Performed by: INTERNAL MEDICINE

## 2017-08-14 PROCEDURE — 97110 THERAPEUTIC EXERCISES: CPT

## 2017-08-14 PROCEDURE — 36416 COLLJ CAPILLARY BLOOD SPEC: CPT | Performed by: INTERNAL MEDICINE

## 2017-08-14 PROCEDURE — 97140 MANUAL THERAPY 1/> REGIONS: CPT

## 2017-08-14 RX ORDER — WARFARIN SODIUM 5 MG/1
5 TABLET ORAL NIGHTLY
Qty: 30 TABLET | Refills: 0 | Status: SHIPPED | OUTPATIENT
Start: 2017-08-14 | End: 2017-10-24

## 2017-08-14 NOTE — PROGRESS NOTES
Dx: L total knee replacement (B61.07)           Authorized # of Visits/Insurance:  [employer] PPO  Script Dates: 7/31-9/11          Next MD visit: 8/23/17 Referring MD: Susanna Gottron Rodts  Fall Risk:  standard         Precautions: n/a           Medication Changes [facility] min  Hold/relax in prone knee flex on L      Neuro-Re-Education        Therapeutic Activity/Self-Care          AROM  Knee Flexion:   8/3/: 85  8/10: 89 off EOB, 92 supine  Knee Extension:  8/3/: -10  8/10: -8  Assessment: Patient demonstrates good progress

## 2017-08-14 NOTE — TELEPHONE ENCOUNTER
Spoke with patient, per pcp patient to ask Dr. Ryan Nixon when he wants patient to stop taking coumadin during her appt with him on 8/23. Patient verbalized understanding, denies further questions.

## 2017-08-14 NOTE — TELEPHONE ENCOUNTER
Patient requesting refill request for coumadin. Asking when should she be off of it?  Has f/u appointment with Dr. Kilgore Speak 8/23

## 2017-08-14 NOTE — PROGRESS NOTES
Coumadin dosing instructions were discussed with patient. Dosing instructions were read back to me accurately. Patient to return in 8/17/17  for recheck of INR. Appointment scheduled.

## 2017-08-15 ENCOUNTER — OFFICE VISIT (OUTPATIENT)
Dept: PHYSICAL THERAPY | Facility: HOSPITAL | Age: 62
End: 2017-08-15
Attending: ORTHOPAEDIC SURGERY
Payer: COMMERCIAL

## 2017-08-15 PROCEDURE — 97140 MANUAL THERAPY 1/> REGIONS: CPT

## 2017-08-15 PROCEDURE — 97110 THERAPEUTIC EXERCISES: CPT

## 2017-08-15 NOTE — PROGRESS NOTES
Dx: L total knee replacement (E58.26)           Authorized # of Visits/Insurance:  [facility] PPO  Script Dates: 7/31-9/11          Next MD visit: 8/23/17 Referring MD: Juan David Yap  Fall Risk:  standard         Precautions: n/a           Medication Changes [facility] x15 ea  Standing hip flex, ext, abd x10 ea 1#  Toe taps 4\" 1# x10 ea  FSU/LSU 4\" x10 ea  TKE: RTB 3s/10  Tandem Stance: ground x1 min ea  SLS: ground x30s ea  IFC/CP: L knee with elevation x10 min       Manual PROM: L knee flexion off EOB, knee ext supin to less than or equal to 43% impairment as predicted, for functional improvement in ADLs. Currently 55% impaired.      Patient Goal: \"be able to go to the grocery, participate in community based activities, be more independent/mobile\"

## 2017-08-17 ENCOUNTER — OFFICE VISIT (OUTPATIENT)
Dept: PHYSICAL THERAPY | Facility: HOSPITAL | Age: 62
End: 2017-08-17
Attending: ORTHOPAEDIC SURGERY
Payer: COMMERCIAL

## 2017-08-17 ENCOUNTER — ANTI-COAG VISIT (OUTPATIENT)
Dept: INTERNAL MEDICINE CLINIC | Facility: CLINIC | Age: 62
End: 2017-08-17

## 2017-08-17 DIAGNOSIS — Z96.652 HISTORY OF TOTAL KNEE ARTHROPLASTY, LEFT: Primary | ICD-10-CM

## 2017-08-17 LAB — INR: 1.8 (ref 0.8–1.2)

## 2017-08-17 PROCEDURE — 97140 MANUAL THERAPY 1/> REGIONS: CPT

## 2017-08-17 PROCEDURE — 36416 COLLJ CAPILLARY BLOOD SPEC: CPT | Performed by: INTERNAL MEDICINE

## 2017-08-17 PROCEDURE — 85610 PROTHROMBIN TIME: CPT | Performed by: INTERNAL MEDICINE

## 2017-08-17 PROCEDURE — 97110 THERAPEUTIC EXERCISES: CPT

## 2017-08-17 NOTE — PROGRESS NOTES
Dx: L total knee replacement (O64.53)           Authorized # of Visits/Insurance:  [employer] PPO  Script Dates: 7/31-9/11          Next MD visit: 8/23/17 Referring MD: Cedric Yap  Fall Risk:  standard         Precautions: n/a           Medication Changes sin 5 laps 1#  Standing marching 1# x30  Standing HR/TR 1# x10 ea  6\" FSU L LE first x10, 1#  Lat step overs 1# 4\" x10  Prone ham curl x15 1#  Bridge x10  Supine SLR x10 ea  Clamshell x10 ea  Ice with elevation x10 min        Manual PROM: L knee flexion off ambulate community distances and tolerate standing longer than 30 min for IADLs. 4. Patient to be able to sit for 1 hour without difficulty for prolonged computer/desk work.    5. Pt to improve FOTO outcomes score to less than or equal to 43% impairment as

## 2017-08-21 ENCOUNTER — APPOINTMENT (OUTPATIENT)
Dept: PHYSICAL THERAPY | Facility: HOSPITAL | Age: 62
End: 2017-08-21
Attending: ORTHOPAEDIC SURGERY
Payer: COMMERCIAL

## 2017-08-22 ENCOUNTER — APPOINTMENT (OUTPATIENT)
Dept: PHYSICAL THERAPY | Facility: HOSPITAL | Age: 62
End: 2017-08-22
Attending: ORTHOPAEDIC SURGERY
Payer: COMMERCIAL

## 2017-08-23 ENCOUNTER — TELEPHONE (OUTPATIENT)
Dept: INTERNAL MEDICINE CLINIC | Facility: CLINIC | Age: 62
End: 2017-08-23

## 2017-08-23 ENCOUNTER — OFFICE VISIT (OUTPATIENT)
Dept: PHYSICAL THERAPY | Facility: HOSPITAL | Age: 62
End: 2017-08-23
Attending: ORTHOPAEDIC SURGERY
Payer: COMMERCIAL

## 2017-08-23 PROCEDURE — 97110 THERAPEUTIC EXERCISES: CPT

## 2017-08-23 PROCEDURE — 97140 MANUAL THERAPY 1/> REGIONS: CPT

## 2017-08-23 NOTE — PROGRESS NOTES
Patient Name: Long Mae  YOB: 1955          MRN number:  [id number]  Date:  8/23/2017  Referring Physician:  Yaneli Reaves  Dx:  L total knee replacement (Z84.89)      Authorized # of Visits/Insurance:  [employer] PPO  Script Dates: 8/23-1 hip/LE strength, limited patellar mobility globally sup/inf>med/lat, reduced swelling globally around L knee joint (pitting edema remains present distally), and improved gait mechanics as listed below.  Recommend continued skilled PT intervention to address Therapeutic Exercise Nustep: UE/LE; L4 x10 min  Seated heel slides with towel x3 min  Prone DKSA stretch: knee flexion x5 min  Prone ham curl x15 ea  Quad set in supine with towel roll under knee x3 min   Supine knee ext prop; 5# x5 min   Manual  ROM/str Exercise, Home Exercise Program instruction and modalities as needed. Patient was advised of these findings, precautions, and treatment options and has agreed to actively participate in planning and for this course of care.     Thank you for your referr

## 2017-08-23 NOTE — TELEPHONE ENCOUNTER
Patient called and stated she saw her surgeon today and he directed her to stop taking the coumadin calling to update  With that information.

## 2017-08-24 ENCOUNTER — OFFICE VISIT (OUTPATIENT)
Dept: PHYSICAL THERAPY | Facility: HOSPITAL | Age: 62
End: 2017-08-24
Attending: ORTHOPAEDIC SURGERY
Payer: COMMERCIAL

## 2017-08-24 PROCEDURE — 97140 MANUAL THERAPY 1/> REGIONS: CPT

## 2017-08-24 PROCEDURE — 97110 THERAPEUTIC EXERCISES: CPT

## 2017-08-24 NOTE — PROGRESS NOTES
Dx: L total knee replacement (T55.66)           Authorized # of Visits/Insurance:  [employer] PPO  Script Dates: 7/31-9/11          Next MD visit: 8/23/17 Referring MD: Peyman Yap  Fall Risk:  standard         Precautions: n/a           Medication Changes sinc knee flex in prone, ext in supine  ROM/strength/function re-assessment   PROM: L knee; flex seated EOB/supine/prone, knee ext; supine PROM L knee flexion/ext at table in prone/supine     Neuro-Re-Education        Therapeutic Activity/Self-Care          SUE participate in community based activities, be more independent/mobile\"

## 2017-08-28 ENCOUNTER — OFFICE VISIT (OUTPATIENT)
Dept: PHYSICAL THERAPY | Facility: HOSPITAL | Age: 62
End: 2017-08-28
Attending: ORTHOPAEDIC SURGERY
Payer: COMMERCIAL

## 2017-08-28 PROCEDURE — 97140 MANUAL THERAPY 1/> REGIONS: CPT

## 2017-08-28 PROCEDURE — 97110 THERAPEUTIC EXERCISES: CPT

## 2017-08-28 PROCEDURE — 97112 NEUROMUSCULAR REEDUCATION: CPT

## 2017-08-28 NOTE — PROGRESS NOTES
Dx: L total knee replacement (C99.04)           Authorized # of Visits/Insurance:  [employer] PPO  Script Dates: 8/23-10/4         Next MD visit: 8/23/17 Referring MD: Susanna Gottron Rodts  Fall Risk:  standard         Precautions: n/a           Medication Changes since Stance on foam: x1 min ea  Romberg Stance: foam; x1 min ea  SLS: ground x30 sec ea     Therapeutic Activity/Self-Care          AROM  Flexion 8/3/17: 85  8/10: 89 off EOB, 92 supine  8/17: 95  8/24: 99   Extension 8/3: -10  8/10: -8  8/17: -8     Assessment in community based activities, be more independent/mobile\" (Progressing 8/23/17)

## 2017-08-30 ENCOUNTER — OFFICE VISIT (OUTPATIENT)
Dept: PHYSICAL THERAPY | Facility: HOSPITAL | Age: 62
End: 2017-08-30
Attending: ORTHOPAEDIC SURGERY
Payer: COMMERCIAL

## 2017-08-30 PROCEDURE — 97140 MANUAL THERAPY 1/> REGIONS: CPT

## 2017-08-30 PROCEDURE — 97110 THERAPEUTIC EXERCISES: CPT

## 2017-08-30 NOTE — PROGRESS NOTES
Dx: L total knee replacement (M32.31)           Authorized # of Visits/Insurance:  [employer] PPO  Script Dates: 8/23-10/4         Next MD visit: 8/23/17 Referring MD: Jannette Yap  Fall Risk:  standard         Precautions: n/a           Medication Changes since strengthening at table with patient tolerating well. Most deficit not in lateral hip musculature and quad L>R. Incorporated scar mobilization with use of IASTM with patient tolerating well.  Demonstrated proper scar mobilization for home; patient verbalized

## 2017-08-31 ENCOUNTER — OFFICE VISIT (OUTPATIENT)
Dept: PHYSICAL THERAPY | Facility: HOSPITAL | Age: 62
End: 2017-08-31
Attending: ORTHOPAEDIC SURGERY
Payer: COMMERCIAL

## 2017-08-31 PROCEDURE — 97110 THERAPEUTIC EXERCISES: CPT

## 2017-08-31 PROCEDURE — 97140 MANUAL THERAPY 1/> REGIONS: CPT

## 2017-08-31 NOTE — PROGRESS NOTES
Dx: L total knee replacement (Z88.71)           Authorized # of Visits/Insurance:  [employer] PPO  Script Dates: 8/23-10/4         Next MD visit: 8/23/17 Referring MD: Christine Yap  Fall Risk:  standard         Precautions: L TKA 7/10/17           Medication Amber overs on foam x15 ea  Tandem Stance on foam: x1 min ea  Romberg Stance: foam; x1 min ea  SLS: ground x30 sec ea       Therapeutic Activity/Self-Care          AROM  Flexion 8/3/17: 85  8/10: 89 off EOB, 92 supine  8/17: 95  8/24: 99  8/31: 100   Extension 8 8/23/17)    Patient Goal: \"be able to go to the grocery, participate in community based activities, be more independent/mobile\" (Progressing 8/23/17)

## 2017-09-04 ENCOUNTER — APPOINTMENT (OUTPATIENT)
Dept: PHYSICAL THERAPY | Facility: HOSPITAL | Age: 62
End: 2017-09-04
Attending: ORTHOPAEDIC SURGERY
Payer: COMMERCIAL

## 2017-09-05 ENCOUNTER — OFFICE VISIT (OUTPATIENT)
Dept: PHYSICAL THERAPY | Facility: HOSPITAL | Age: 62
End: 2017-09-05
Attending: ORTHOPAEDIC SURGERY
Payer: COMMERCIAL

## 2017-09-05 PROCEDURE — 97140 MANUAL THERAPY 1/> REGIONS: CPT

## 2017-09-05 PROCEDURE — 97110 THERAPEUTIC EXERCISES: CPT

## 2017-09-05 NOTE — PROGRESS NOTES
Dx: L total knee replacement (V36.00)           Authorized # of Visits/Insurance:  [employer] PPO  Script Dates: 8/23-10/4         Next MD visit: 9/12/17 Referring MD: Tanya Yap  Fall Risk:  standard         Precautions: L TKA 7/10/17           Medication Amber 95  8/24: 99  8/31: 100   Extension 8/3: -10  8/10: -8  8/17: -8  8/31: -5     Assessment: Patient demonstrated no adverse affects to treatment. Improved capsular mobility noted with PROM and AP/PA joint mobilization.  Improved scar mobility noted globally

## 2017-09-05 NOTE — OCCUPATIONAL THERAPY NOTE
INSTILL 1 DROP INTO BOTH EYES TWICE A DAY OCCUPATIONAL THERAPY TREATMENT NOTE - INPATIENT     Room Number: 428/428-A         Presenting Problem: L TKA    Problem List  Active Problems:    HTN (hypertension)    Dyslipidemia    DM type 2 (diabetes mellitus, type 2) (HCC)    GERD (gastroesophageal re Little  -   Bathing (including washing, rinsing, drying)?: A Little  -   Toileting, which includes using toilet, bedpan or urinal? : None  -   Putting on and taking off regular upper body clothing?: None  -   Taking care of personal grooming such as Nicky Catalan

## 2017-09-06 ENCOUNTER — OFFICE VISIT (OUTPATIENT)
Dept: PHYSICAL THERAPY | Facility: HOSPITAL | Age: 62
End: 2017-09-06
Attending: ORTHOPAEDIC SURGERY
Payer: COMMERCIAL

## 2017-09-06 PROCEDURE — 97140 MANUAL THERAPY 1/> REGIONS: CPT

## 2017-09-06 PROCEDURE — 97112 NEUROMUSCULAR REEDUCATION: CPT

## 2017-09-06 PROCEDURE — 97110 THERAPEUTIC EXERCISES: CPT

## 2017-09-06 NOTE — PROGRESS NOTES
Dx: L total knee replacement (J34.74)           Authorized # of Visits/Insurance:  [employer] PPO  Script Dates: 8/23-10/4         Next MD visit: 9/12/17 Referring MD: No ref.  provider found  Fall Risk:  standard         Precautions: L TKA 7/10/17           Medi knee flexion in prone, ext in supine  Grade 3 AP/PA mobs of L Knee EOB Scar mobilization with IASTM   PROM: L knee flexion EOB/prone/supine, ext supine  Grade 3 AP/PA tib/fib EOB PROM: L knee flexion EOB  Grade 3 AP/PA tib/fib EOB     Neuro-Re-Education prolonged computer/desk work. (Progressing 8/23/17)    5. Pt to improve FOTO outcomes score to less than or equal to 43% impairment as predicted, for functional improvement in ADLs. Currently 55% impaired.  (Not Assessed 8/23/17)    Patient Goal: \"be able

## 2017-09-07 ENCOUNTER — OFFICE VISIT (OUTPATIENT)
Dept: PHYSICAL THERAPY | Facility: HOSPITAL | Age: 62
End: 2017-09-07
Attending: ORTHOPAEDIC SURGERY
Payer: COMMERCIAL

## 2017-09-07 PROCEDURE — 97110 THERAPEUTIC EXERCISES: CPT

## 2017-09-07 PROCEDURE — 97140 MANUAL THERAPY 1/> REGIONS: CPT

## 2017-09-07 NOTE — PROGRESS NOTES
Dx: L total knee replacement (X01.45)           Authorized # of Visits/Insurance:  BCBS PPO  Script Dates: 8/23-10/4         Next MD visit: 9/12/17 Referring MD: Flaquita Yap  Fall Risk:  standard         Precautions: L TKA 7/10/17           Medication Amber dips off 4\" L LE only x10  Prone iso hip IR (RTB)/ER 3s/10  DKSA quad stretch in prone x5 min  SAQ/LAQ 2# 2x10  Bridge x10  Prone quad set 2# 2x10       Manual Scar mobilization with use of IASTM  PROM: L knee flexion in prone, ext in supine  Grade 3 AP/P and tolerate standing longer than 30 min for IADLs. (Progressing 8/23/17)   4. Patient to be able to sit for 1 hour without difficulty for prolonged computer/desk work.  (Progressing 8/23/17)    5. Pt to improve FOTO outcomes score to less than or equal to

## 2017-09-11 ENCOUNTER — OFFICE VISIT (OUTPATIENT)
Dept: PHYSICAL THERAPY | Facility: HOSPITAL | Age: 62
End: 2017-09-11
Attending: ORTHOPAEDIC SURGERY
Payer: COMMERCIAL

## 2017-09-11 PROCEDURE — 97110 THERAPEUTIC EXERCISES: CPT

## 2017-09-11 PROCEDURE — 97140 MANUAL THERAPY 1/> REGIONS: CPT

## 2017-09-11 NOTE — PROGRESS NOTES
Dx: L total knee replacement (K60.13)           Authorized # of Visits/Insurance:  BCBS PPO  Script Dates: 8/23-10/4         Next MD visit: 9/12/17 Referring MD: David Yap  Fall Risk:  standard         Precautions: L TKA 7/10/17           Medication Amber curl 1# x10 ea  Prone glute isolation 1# x10 ea         Manual PROM: L knee flexion EOB  Grade 3 AP/PA tib/fib EOB PROM: L knee flex; EOB/prone/supine, ext; supine PROM: L knee flex; prone/supine, ext; supine  Scar massage, IASTM/STM: L hamstring  Grade 4 IADLs. (Progressing 8/23/17)   4. Patient to be able to sit for 1 hour without difficulty for prolonged computer/desk work.  (Progressing 8/23/17)    5. Pt to improve FOTO outcomes score to less than or equal to 43% impairment as predicted, for functional i

## 2017-09-13 ENCOUNTER — OFFICE VISIT (OUTPATIENT)
Dept: PHYSICAL THERAPY | Facility: HOSPITAL | Age: 62
End: 2017-09-13
Attending: ORTHOPAEDIC SURGERY
Payer: COMMERCIAL

## 2017-09-13 PROCEDURE — 97110 THERAPEUTIC EXERCISES: CPT

## 2017-09-13 PROCEDURE — 97112 NEUROMUSCULAR REEDUCATION: CPT

## 2017-09-13 NOTE — PROGRESS NOTES
Dx: L total knee replacement (G82.74)           Authorized # of Visits/Insurance:  BCBS PPO  Script Dates: 8/23-10/4         Next MD visit: 9/12/17 Referring MD: Avinash Yap  Fall Risk:  standard         Precautions: L TKA 7/10/17           Medication Amber UE/LE; L7 x10 min  Total Gym:   -DL; top hook 2x10  -SL eccentric lowerinth hook from top x10 L LE only  FSU/lateral step overs: 6\" x20  Forward dips off 6\" 2# ankle weight on R x10 L LE only  DL stand, SL to sit on raised table x10  BTB: TKE 2x10 L transfer in/out of shower without difficulty. (Progressing 8/23/17); updated 8/23/17 from 120-105 degrees   3. Patient to increase global LE strength 1/2 MMT in order to ambulate community distances and tolerate standing longer than 30 min for IADLs.  (Prog

## 2017-09-14 ENCOUNTER — APPOINTMENT (OUTPATIENT)
Dept: PHYSICAL THERAPY | Facility: HOSPITAL | Age: 62
End: 2017-09-14
Attending: ORTHOPAEDIC SURGERY
Payer: COMMERCIAL

## 2017-09-18 ENCOUNTER — OFFICE VISIT (OUTPATIENT)
Dept: PHYSICAL THERAPY | Facility: HOSPITAL | Age: 62
End: 2017-09-18
Attending: ORTHOPAEDIC SURGERY
Payer: COMMERCIAL

## 2017-09-18 DIAGNOSIS — Z96.659 S/P TOTAL KNEE REPLACEMENT: ICD-10-CM

## 2017-09-18 PROCEDURE — 97140 MANUAL THERAPY 1/> REGIONS: CPT

## 2017-09-18 PROCEDURE — 97110 THERAPEUTIC EXERCISES: CPT

## 2017-09-18 NOTE — PROGRESS NOTES
Dx: L total knee replacement (W71.44)           Authorized # of Visits/Insurance:  BCBS PPO  Script Dates: 8/23-10/4         Next MD visit: 9/12/17 Referring MD: Flaquita Yap  Fall Risk:  standard         Precautions: L TKA 7/10/17           Medication Amber 2x10  -SL eccentric lowerinth hook from top x10 L LE only  FSU/lateral step overs: 6\" x20  Forward dips off 6\" 2# ankle weight on R x10 L LE only  DL stand, SL to sit on raised table x10  BTB: TKE 2x10 L LE  PROM: L knee flexion off EOB x5 min    Nus PT, DPT    Long Term Goals Timeframe (6 weeks, 8/23-10/4)  1. Patient to be independent with progressive HEP during and upon discharge to aide with symptom management and return to previous level of function.  (Progressing 8/23/17)   2. Patient to Kristofer Gandara

## 2017-09-20 ENCOUNTER — OFFICE VISIT (OUTPATIENT)
Dept: PHYSICAL THERAPY | Facility: HOSPITAL | Age: 62
End: 2017-09-20
Attending: ORTHOPAEDIC SURGERY
Payer: COMMERCIAL

## 2017-09-20 PROCEDURE — 97110 THERAPEUTIC EXERCISES: CPT

## 2017-09-20 PROCEDURE — 97140 MANUAL THERAPY 1/> REGIONS: CPT

## 2017-09-20 NOTE — PROGRESS NOTES
Dx: L total knee replacement (N35.54)           Authorized # of Visits/Insurance:  BCBS PPO  Script Dates: 8/23-10/4         Next MD visit: 9/12/17 Referring MD: Susanna Gottron Rodts  Fall Risk:  standard         Precautions: L TKA 7/10/17           Medication Amber x10  DL HR/SL ecc return x10 L LE only  Monster/Retro/Lat walks YTB x3 laps ea  DKSA quad stretch in prone x3 min  *received ice to end with elevation x10 min     Manual  PROM: L knee flexion in prone, ext in supine  Scar mobilization/massage with IASTM [place] IADLs. (Progressing 8/23/17)   4. Patient to be able to sit for 1 hour without difficulty for prolonged computer/desk work.  (Progressing 8/23/17)    5. Pt to improve FOTO outcomes score to less than or equal to 43% impairment as predicted, for functional i

## 2017-09-21 ENCOUNTER — APPOINTMENT (OUTPATIENT)
Dept: PHYSICAL THERAPY | Facility: HOSPITAL | Age: 62
End: 2017-09-21
Attending: ORTHOPAEDIC SURGERY
Payer: COMMERCIAL

## 2017-09-25 ENCOUNTER — OFFICE VISIT (OUTPATIENT)
Dept: PHYSICAL THERAPY | Facility: HOSPITAL | Age: 62
End: 2017-09-25
Attending: ORTHOPAEDIC SURGERY
Payer: COMMERCIAL

## 2017-09-25 PROCEDURE — 97112 NEUROMUSCULAR REEDUCATION: CPT

## 2017-09-25 PROCEDURE — 97110 THERAPEUTIC EXERCISES: CPT

## 2017-09-25 NOTE — PROGRESS NOTES
Dx: L total knee replacement (B31.64)           Authorized # of Visits/Insurance:  BCBS PPO  Script Dates: 8/23-10/4         Next MD visit: 9/12/17 Referring MD: Srinivas Yap  Fall Risk:  standard         Precautions: L TKA 7/10/17           Medication Amber rung from top 2x10  Forward step down: 4\" step L LE on step x10  DL HR/SL ecc return x10 L LE only  Monster/Retro/Lat walks YTB x3 laps ea  DKSA quad stretch in prone x3 min  *received ice to end with elevation x10 min Nustep: UE/LE L8 x5 min  DKSA quad s Patient to be independent with progressive HEP during and upon discharge to aide with symptom management and return to previous level of function.  (Progressing 8/23/17)   2. Patient to demonstrate improved L knee flexion AROM to 105 degrees in order to neg

## 2017-09-26 ENCOUNTER — APPOINTMENT (OUTPATIENT)
Dept: PHYSICAL THERAPY | Facility: HOSPITAL | Age: 62
End: 2017-09-26
Attending: ORTHOPAEDIC SURGERY
Payer: COMMERCIAL

## 2017-09-27 ENCOUNTER — OFFICE VISIT (OUTPATIENT)
Dept: PHYSICAL THERAPY | Facility: HOSPITAL | Age: 62
End: 2017-09-27
Attending: ORTHOPAEDIC SURGERY
Payer: COMMERCIAL

## 2017-09-27 PROCEDURE — 97110 THERAPEUTIC EXERCISES: CPT

## 2017-09-27 NOTE — PROGRESS NOTES
Dx: L total knee replacement (G04.17)           Authorized # of Visits/Insurance:  BCBS PPO  Script Dates: 8/23-10/4         Next MD visit: 10/10/17 Referring MD: Flaquita Yap  Fall Risk:  standard         Precautions: L TKA 7/10/17           Medication Ch step down: 4\" step L LE on step x10  DL HR/SL ecc return x10 L LE only  Monster/Retro/Lat walks YTB x3 laps ea  DKSA quad stretch in prone x3 min  *received ice to end with elevation x10 min Nustep: UE/LE L8 x5 min  DKSA quad stretch in prone x5 min  Lamont Castano mobility noted globally. Plan: Continue to focus on ROM/strength progression with emphasis on balance, gait, and quadriceps strengthening.      Charges: 3 TherEx     Total Timed Treatment: 45 min  Total Treatment Time: 55 min  Therapist: Eligio Peck PT

## 2017-09-28 ENCOUNTER — APPOINTMENT (OUTPATIENT)
Dept: PHYSICAL THERAPY | Facility: HOSPITAL | Age: 62
End: 2017-09-28
Attending: ORTHOPAEDIC SURGERY
Payer: COMMERCIAL

## 2017-10-02 ENCOUNTER — OFFICE VISIT (OUTPATIENT)
Dept: PHYSICAL THERAPY | Facility: HOSPITAL | Age: 62
End: 2017-10-02
Attending: ORTHOPAEDIC SURGERY
Payer: COMMERCIAL

## 2017-10-02 PROCEDURE — 97140 MANUAL THERAPY 1/> REGIONS: CPT

## 2017-10-02 PROCEDURE — 97110 THERAPEUTIC EXERCISES: CPT

## 2017-10-02 NOTE — PROGRESS NOTES
Patient Name: Jose A Richards  YOB: 1955          MRN number:  [id number]  Date:  10/2/2017  Referring Physician:  Ana Luisa Braov  Dx:  L total knee replacement (Z84.89)      Authorized # of Visits/Insurance:  BCBS PPO  Script Dates: 10/2-1 8/23/2017 10/2/2017   Knee Flexion 105,  97 pain 105, 102   Knee Extension -25, -6 pain -25, -5     PROM: mild limitation in patellar mobility noted globally     LE Strength: -/5, R/L   8/23/2017 10/2/2017   Hip Flexion 4/4 4+/4+   Hip Extension 4-/4- 4/4 EOB  LAD: EOB 10s/10   Neuromuscular Re-education    Therapeutic Activity     *Patient received ice with knee elevation at end for 10 min    Charges: 1 Man, 2 TherEx      Total Timed Treatment: 45 min     Total Treatment Time: 55 min    Long Term Goals Pedro Bernal have any questions, please contact me at Dept: [phone].     Sincerely,  Electronically signed by therapist: Sunita Glaser PT    [de-identified] certification required: Yes  I certify the need for these services furnished under this plan of treatment and wh

## 2017-10-03 ENCOUNTER — OFFICE VISIT (OUTPATIENT)
Dept: PHYSICAL THERAPY | Facility: HOSPITAL | Age: 62
End: 2017-10-03
Attending: ORTHOPAEDIC SURGERY
Payer: COMMERCIAL

## 2017-10-03 PROCEDURE — 97140 MANUAL THERAPY 1/> REGIONS: CPT

## 2017-10-03 PROCEDURE — 97110 THERAPEUTIC EXERCISES: CPT

## 2017-10-03 NOTE — PROGRESS NOTES
Dx: L total knee replacement (I39.66)           Authorized # of Visits/Insurance:  BCBS PPO  Script Dates: 10/2-10/23         Next MD visit: 10/10/17 Referring MD: Rosy Yap  Fall Risk:  standard         Precautions: L TKA 7/10/17           Medication C knee: flexion EOB, ext supine   Scar mobs: x5 min  Ice with knee elevation x10 min Nustep: UE/LE; L8 x10 min    Seated heel slides with towel x3 min    Prone DKSA stretch: knee flexion x5 min    Supine knee ext stretch: 4# on thigh, x5 min    Quad set in s stair negotiation, and quadriceps strengthening. Charges: 1 Man, 2 TherEx     Total Timed Treatment: 45 min  Total Treatment Time: 55 min  Therapist: Gloria Jorge PT, DPT    Long Term Goals Timeframe (10/2-10/23)  1.  Patient to be independent with prog

## 2017-10-09 ENCOUNTER — OFFICE VISIT (OUTPATIENT)
Dept: PHYSICAL THERAPY | Facility: HOSPITAL | Age: 62
End: 2017-10-09
Attending: ORTHOPAEDIC SURGERY
Payer: COMMERCIAL

## 2017-10-09 PROCEDURE — 97140 MANUAL THERAPY 1/> REGIONS: CPT | Performed by: PHYSICAL THERAPIST

## 2017-10-09 NOTE — PROGRESS NOTES
Dx: L total knee replacement (K21.97)           Authorized # of Visits/Insurance:  BCBS PPO  Script Dates: 10/2-10/23         Next MD visit: 10/10/17 Referring MD: John Yap  Fall Risk:  standard         Precautions: L TKA 7/10/17           Medication C EOB, ext supine   Scar mobs: x5 min  Ice with knee elevation x10 min Nustep: UE/LE; L8 x10 min    Seated heel slides with towel x3 min    Prone DKSA stretch: knee flexion x5 min    Supine knee ext stretch: 4# on thigh, x5 min    Quad set in supine with tow quadriceps strengthening. Charges: Man 3  Total Timed Treatment: 45 min  Total Treatment Time: 55 min  Therapist: Itzel Lino PT, DPT, Cert MDT    Long Term Goals Timeframe (10/2-10/23)  1.  Patient to be independent with progressive HEP during and upon

## 2017-10-17 ENCOUNTER — OFFICE VISIT (OUTPATIENT)
Dept: PHYSICAL THERAPY | Facility: HOSPITAL | Age: 62
End: 2017-10-17
Attending: ORTHOPAEDIC SURGERY
Payer: COMMERCIAL

## 2017-10-17 PROCEDURE — 97110 THERAPEUTIC EXERCISES: CPT

## 2017-10-17 PROCEDURE — 97140 MANUAL THERAPY 1/> REGIONS: CPT

## 2017-10-17 NOTE — PROGRESS NOTES
Dx: L total knee replacement (L78.81)           Authorized # of Visits/Insurance:  BCBS PPO  Script Dates: 10/2-10/23         Next MD visit: 10/10/17 Referring MD: John Yap  Fall Risk:  standard         Precautions: L TKA 7/10/17           Medication C from top x15  Total Gym: DL up, SL lowering, L LE only; 3rd hook from top x10  Ice to end x10 min with elevation    Nustep UE/LE: L9 x 10 min Nustep UE/LE: L9 x 10 min  Stair negotiation: ascending reciprocal (no cane, use of 1 railing), descending non-rec degrees in order to negotiate stairs, transfer in/out of car/chair/bed, and transfer in/out of shower without difficulty. (Progressing 10/2/17)  3.  Patient to increase global LE strength 1/2 MMT in order to ambulate community distances and tolerate standin

## 2017-10-19 ENCOUNTER — APPOINTMENT (OUTPATIENT)
Dept: PHYSICAL THERAPY | Facility: HOSPITAL | Age: 62
End: 2017-10-19
Attending: ORTHOPAEDIC SURGERY
Payer: COMMERCIAL

## 2017-10-23 ENCOUNTER — APPOINTMENT (OUTPATIENT)
Dept: PHYSICAL THERAPY | Facility: HOSPITAL | Age: 62
End: 2017-10-23
Attending: ORTHOPAEDIC SURGERY
Payer: COMMERCIAL

## 2017-10-24 ENCOUNTER — OFFICE VISIT (OUTPATIENT)
Dept: INTERNAL MEDICINE CLINIC | Facility: CLINIC | Age: 62
End: 2017-10-24

## 2017-10-24 ENCOUNTER — TELEPHONE (OUTPATIENT)
Dept: INTERNAL MEDICINE CLINIC | Facility: CLINIC | Age: 62
End: 2017-10-24

## 2017-10-24 VITALS
TEMPERATURE: 98 F | DIASTOLIC BLOOD PRESSURE: 70 MMHG | SYSTOLIC BLOOD PRESSURE: 117 MMHG | HEIGHT: 63 IN | WEIGHT: 246.63 LBS | BODY MASS INDEX: 43.7 KG/M2 | HEART RATE: 70 BPM

## 2017-10-24 DIAGNOSIS — R19.7 DIARRHEA OF PRESUMED INFECTIOUS ORIGIN: ICD-10-CM

## 2017-10-24 DIAGNOSIS — Z01.818 PREOP EXAMINATION: Primary | ICD-10-CM

## 2017-10-24 DIAGNOSIS — Z01.818 PRE-OP EXAMINATION: Primary | ICD-10-CM

## 2017-10-24 PROCEDURE — 99212 OFFICE O/P EST SF 10 MIN: CPT | Performed by: INTERNAL MEDICINE

## 2017-10-24 PROCEDURE — 99214 OFFICE O/P EST MOD 30 MIN: CPT | Performed by: INTERNAL MEDICINE

## 2017-10-24 PROCEDURE — 93000 ELECTROCARDIOGRAM COMPLETE: CPT | Performed by: INTERNAL MEDICINE

## 2017-10-24 PROCEDURE — 93005 ELECTROCARDIOGRAM TRACING: CPT | Performed by: INTERNAL MEDICINE

## 2017-10-24 RX ORDER — LANCETS
EACH MISCELLANEOUS
Qty: 100 EACH | Refills: 3 | Status: SHIPPED | OUTPATIENT
Start: 2017-10-24 | End: 2018-09-05

## 2017-10-24 NOTE — TELEPHONE ENCOUNTER
Call place to Dr. Lily Mack office to check if they require urinalysis with culture and sensitivity for pre op. Per , Dr. Lily Mack does not but pre op department does. Pending orders, please sign. Patient aware.

## 2017-10-24 NOTE — PROGRESS NOTES
Domingo Crane is a 60year old female who presents for a pre-operative physical exam. Patient is to have R Tka  to be done by Dr. Airam Woo  On November 13    Pt has had previous anesthesia:  Yes.   Previous complications:  No  Patient presents with:  Pre Diverticulitis    • High blood pressure    • Other and unspecified hyperlipidemia    • Sleep apnea    • Unspecified essential hypertension       Past Surgical History:  : BSO, OMENTECTOMY W/BETH      Comment: 1 ovary left  1977:    joint pain  NEURO: denies headaches  PSYCHE: denies depression or anxiety  HEMATOLOGIC: denies hx of anemia  ALL/ASTHMA: denies hx of allergy or asthma    EXAM:   /70 (BP Location: Left arm, Patient Position: Sitting, Cuff Size: large)   Pulse 70   T stratification. Medical conditions are optimized for surgery and perioperative medication recommendations are outlined above. Assessment:  No diagnosis found. Plan   Orders:  No orders of the defined types were placed in this encounter.     Olivier Vásquez

## 2017-10-25 ENCOUNTER — OFFICE VISIT (OUTPATIENT)
Dept: PHYSICAL THERAPY | Facility: HOSPITAL | Age: 62
End: 2017-10-25
Attending: ORTHOPAEDIC SURGERY
Payer: COMMERCIAL

## 2017-10-25 ENCOUNTER — LAB ENCOUNTER (OUTPATIENT)
Dept: LAB | Facility: HOSPITAL | Age: 62
End: 2017-10-25
Attending: INTERNAL MEDICINE
Payer: COMMERCIAL

## 2017-10-25 DIAGNOSIS — M17.11 PRIMARY OSTEOARTHRITIS OF RIGHT KNEE: Primary | ICD-10-CM

## 2017-10-25 DIAGNOSIS — Z01.818 PREOP EXAMINATION: ICD-10-CM

## 2017-10-25 DIAGNOSIS — Z01.818 PRE-OP EXAMINATION: ICD-10-CM

## 2017-10-25 PROCEDURE — 86901 BLOOD TYPING SEROLOGIC RH(D): CPT

## 2017-10-25 PROCEDURE — 87086 URINE CULTURE/COLONY COUNT: CPT

## 2017-10-25 PROCEDURE — 85730 THROMBOPLASTIN TIME PARTIAL: CPT

## 2017-10-25 PROCEDURE — 81001 URINALYSIS AUTO W/SCOPE: CPT

## 2017-10-25 PROCEDURE — 97140 MANUAL THERAPY 1/> REGIONS: CPT

## 2017-10-25 PROCEDURE — 85610 PROTHROMBIN TIME: CPT

## 2017-10-25 PROCEDURE — 97110 THERAPEUTIC EXERCISES: CPT

## 2017-10-25 PROCEDURE — 85027 COMPLETE CBC AUTOMATED: CPT

## 2017-10-25 PROCEDURE — 36415 COLL VENOUS BLD VENIPUNCTURE: CPT

## 2017-10-25 PROCEDURE — 80048 BASIC METABOLIC PNL TOTAL CA: CPT

## 2017-10-25 PROCEDURE — 86900 BLOOD TYPING SEROLOGIC ABO: CPT

## 2017-10-25 PROCEDURE — 86850 RBC ANTIBODY SCREEN: CPT

## 2017-10-25 PROCEDURE — 87641 MR-STAPH DNA AMP PROBE: CPT

## 2017-10-25 NOTE — PROGRESS NOTES
Patient Name: Julissa Vick  YOB: 1955          MRN number:  [id number]  Date:  10/25/2017  Referring Physician:  Henry Zhu  Dx:  L total knee replacement (Z84.89)      Authorized # of Visits/Insurance:  BCBS PPO  Script Dates: 10/2- knee flexion during swing (no circumduction)  Palpation: WNL  Sensation: diminished along lateral aspect of incision    AROM: R/L   8/23/2017 10/2/2017 10/25/2017   Knee Flexion 105,  97 pain 105, 102 105, 102   Knee Extension -25, -6 pain -25, -5 -25, -3 EOB/supine/prone, knee ext; supine  Grade 3 AP/PA mobs EOB  LAD: EOB 10s/10   Neuromuscular Re-education    Therapeutic Activity         Charges: 1 Man, 1 TherEx      Total Timed Treatment: 35 min     Total Treatment Time: 40 min    Long Term Goals [place] care.    X___________________________________________________ Date____________________

## 2017-11-01 ENCOUNTER — APPOINTMENT (OUTPATIENT)
Dept: LAB | Facility: HOSPITAL | Age: 62
End: 2017-11-01
Attending: INTERNAL MEDICINE
Payer: COMMERCIAL

## 2017-11-01 DIAGNOSIS — Z01.818 PREOP EXAMINATION: ICD-10-CM

## 2017-11-01 PROCEDURE — 81001 URINALYSIS AUTO W/SCOPE: CPT

## 2017-11-06 RX ORDER — FAMOTIDINE 20 MG/1
20 TABLET ORAL ONCE
Status: DISCONTINUED | OUTPATIENT
Start: 2017-11-06 | End: 2017-11-06

## 2017-11-06 RX ORDER — SODIUM CHLORIDE, SODIUM LACTATE, POTASSIUM CHLORIDE, CALCIUM CHLORIDE 600; 310; 30; 20 MG/100ML; MG/100ML; MG/100ML; MG/100ML
INJECTION, SOLUTION INTRAVENOUS CONTINUOUS
Status: DISCONTINUED | OUTPATIENT
Start: 2017-11-06 | End: 2017-11-06

## 2017-11-06 RX ORDER — METOCLOPRAMIDE 10 MG/1
10 TABLET ORAL ONCE
Status: DISCONTINUED | OUTPATIENT
Start: 2017-11-06 | End: 2017-11-06

## 2017-11-06 RX ORDER — ACETAMINOPHEN 500 MG
1000 TABLET ORAL ONCE
Status: DISCONTINUED | OUTPATIENT
Start: 2017-11-06 | End: 2017-11-06

## 2017-11-13 ENCOUNTER — HOSPITAL ENCOUNTER (INPATIENT)
Facility: HOSPITAL | Age: 62
LOS: 3 days | Discharge: HOME HEALTH | DRG: 470 | End: 2017-11-16
Attending: ORTHOPAEDIC SURGERY | Admitting: ORTHOPAEDIC SURGERY
Payer: COMMERCIAL

## 2017-11-13 ENCOUNTER — APPOINTMENT (OUTPATIENT)
Dept: GENERAL RADIOLOGY | Facility: HOSPITAL | Age: 62
DRG: 470 | End: 2017-11-13
Attending: ORTHOPAEDIC SURGERY
Payer: COMMERCIAL

## 2017-11-13 ENCOUNTER — ANESTHESIA EVENT (OUTPATIENT)
Dept: SURGERY | Facility: HOSPITAL | Age: 62
DRG: 470 | End: 2017-11-13
Payer: COMMERCIAL

## 2017-11-13 ENCOUNTER — ANESTHESIA (OUTPATIENT)
Dept: SURGERY | Facility: HOSPITAL | Age: 62
DRG: 470 | End: 2017-11-13
Payer: COMMERCIAL

## 2017-11-13 ENCOUNTER — SURGERY (OUTPATIENT)
Age: 62
End: 2017-11-13

## 2017-11-13 DIAGNOSIS — M17.11 PRIMARY OSTEOARTHRITIS OF RIGHT KNEE: Primary | ICD-10-CM

## 2017-11-13 PROCEDURE — 3E0T3BZ INTRODUCTION OF ANESTHETIC AGENT INTO PERIPHERAL NERVES AND PLEXI, PERCUTANEOUS APPROACH: ICD-10-PCS | Performed by: ANESTHESIOLOGY

## 2017-11-13 PROCEDURE — 99221 1ST HOSP IP/OBS SF/LOW 40: CPT | Performed by: INTERNAL MEDICINE

## 2017-11-13 PROCEDURE — 73560 X-RAY EXAM OF KNEE 1 OR 2: CPT | Performed by: ORTHOPAEDIC SURGERY

## 2017-11-13 PROCEDURE — 0SRC0J9 REPLACEMENT OF RIGHT KNEE JOINT WITH SYNTHETIC SUBSTITUTE, CEMENTED, OPEN APPROACH: ICD-10-PCS | Performed by: ORTHOPAEDIC SURGERY

## 2017-11-13 DEVICE — COMPONENT PTLR 31MM 1 PG WRE: Type: IMPLANTABLE DEVICE | Site: KNEE | Status: FUNCTIONAL

## 2017-11-13 DEVICE — CEMENT BONE ZIM PALICOS R: Type: IMPLANTABLE DEVICE | Site: KNEE | Status: FUNCTIONAL

## 2017-11-13 RX ORDER — SODIUM CHLORIDE, SODIUM LACTATE, POTASSIUM CHLORIDE, CALCIUM CHLORIDE 600; 310; 30; 20 MG/100ML; MG/100ML; MG/100ML; MG/100ML
INJECTION, SOLUTION INTRAVENOUS CONTINUOUS
Status: DISCONTINUED | OUTPATIENT
Start: 2017-11-13 | End: 2017-11-16

## 2017-11-13 RX ORDER — ACETAMINOPHEN 500 MG
1000 TABLET ORAL ONCE
Status: DISCONTINUED | OUTPATIENT
Start: 2017-11-13 | End: 2017-11-16

## 2017-11-13 RX ORDER — ACETAMINOPHEN 500 MG
1000 TABLET ORAL EVERY 8 HOURS
Status: COMPLETED | OUTPATIENT
Start: 2017-11-13 | End: 2017-11-14

## 2017-11-13 RX ORDER — DEXAMETHASONE SODIUM PHOSPHATE 10 MG/ML
INJECTION, SOLUTION INTRAMUSCULAR; INTRAVENOUS AS NEEDED
Status: DISCONTINUED | OUTPATIENT
Start: 2017-11-13 | End: 2017-11-13 | Stop reason: SURG

## 2017-11-13 RX ORDER — BISACODYL 10 MG
10 SUPPOSITORY, RECTAL RECTAL
Status: DISCONTINUED | OUTPATIENT
Start: 2017-11-13 | End: 2017-11-16

## 2017-11-13 RX ORDER — ASPIRIN 81 MG/1
81 TABLET, CHEWABLE ORAL DAILY
Status: DISCONTINUED | OUTPATIENT
Start: 2017-11-13 | End: 2017-11-16

## 2017-11-13 RX ORDER — SODIUM CHLORIDE 9 MG/ML
INJECTION, SOLUTION INTRAVENOUS
Status: COMPLETED
Start: 2017-11-13 | End: 2017-11-13

## 2017-11-13 RX ORDER — DEXAMETHASONE SODIUM PHOSPHATE 4 MG/ML
VIAL (ML) INJECTION AS NEEDED
Status: DISCONTINUED | OUTPATIENT
Start: 2017-11-13 | End: 2017-11-13 | Stop reason: SURG

## 2017-11-13 RX ORDER — ONDANSETRON 2 MG/ML
4 INJECTION INTRAMUSCULAR; INTRAVENOUS EVERY 4 HOURS PRN
Status: DISCONTINUED | OUTPATIENT
Start: 2017-11-13 | End: 2017-11-16

## 2017-11-13 RX ORDER — MORPHINE SULFATE 4 MG/ML
4 INJECTION, SOLUTION INTRAMUSCULAR; INTRAVENOUS EVERY 2 HOUR PRN
Status: ACTIVE | OUTPATIENT
Start: 2017-11-13 | End: 2017-11-14

## 2017-11-13 RX ORDER — LIDOCAINE HYDROCHLORIDE 10 MG/ML
INJECTION, SOLUTION EPIDURAL; INFILTRATION; INTRACAUDAL; PERINEURAL AS NEEDED
Status: DISCONTINUED | OUTPATIENT
Start: 2017-11-13 | End: 2017-11-13 | Stop reason: SURG

## 2017-11-13 RX ORDER — OXYCODONE HYDROCHLORIDE 5 MG/1
10 TABLET ORAL EVERY 4 HOURS PRN
Status: DISPENSED | OUTPATIENT
Start: 2017-11-13 | End: 2017-11-14

## 2017-11-13 RX ORDER — MORPHINE SULFATE 15 MG/1
15 TABLET ORAL EVERY 4 HOURS PRN
Status: ACTIVE | OUTPATIENT
Start: 2017-11-13 | End: 2017-11-14

## 2017-11-13 RX ORDER — OXYCODONE HYDROCHLORIDE 5 MG/1
5 TABLET ORAL EVERY 4 HOURS PRN
Status: DISPENSED | OUTPATIENT
Start: 2017-11-13 | End: 2017-11-14

## 2017-11-13 RX ORDER — POLYETHYLENE GLYCOL 3350 17 G/17G
17 POWDER, FOR SOLUTION ORAL DAILY PRN
Status: DISCONTINUED | OUTPATIENT
Start: 2017-11-13 | End: 2017-11-16

## 2017-11-13 RX ORDER — MORPHINE SULFATE 2 MG/ML
2 INJECTION, SOLUTION INTRAMUSCULAR; INTRAVENOUS EVERY 2 HOUR PRN
Status: ACTIVE | OUTPATIENT
Start: 2017-11-13 | End: 2017-11-14

## 2017-11-13 RX ORDER — WARFARIN SODIUM 5 MG/1
5 TABLET ORAL ONCE
Status: COMPLETED | OUTPATIENT
Start: 2017-11-13 | End: 2017-11-13

## 2017-11-13 RX ORDER — HYDROCODONE BITARTRATE AND ACETAMINOPHEN 5; 325 MG/1; MG/1
2 TABLET ORAL AS NEEDED
Status: DISCONTINUED | OUTPATIENT
Start: 2017-11-13 | End: 2017-11-13 | Stop reason: HOSPADM

## 2017-11-13 RX ORDER — SODIUM PHOSPHATE, DIBASIC AND SODIUM PHOSPHATE, MONOBASIC 7; 19 G/133ML; G/133ML
1 ENEMA RECTAL ONCE AS NEEDED
Status: DISCONTINUED | OUTPATIENT
Start: 2017-11-13 | End: 2017-11-16

## 2017-11-13 RX ORDER — PANTOPRAZOLE SODIUM 40 MG/1
40 TABLET, DELAYED RELEASE ORAL
Status: DISCONTINUED | OUTPATIENT
Start: 2017-11-14 | End: 2017-11-16

## 2017-11-13 RX ORDER — DEXTROSE MONOHYDRATE 25 G/50ML
50 INJECTION, SOLUTION INTRAVENOUS AS NEEDED
Status: DISCONTINUED | OUTPATIENT
Start: 2017-11-13 | End: 2017-11-16

## 2017-11-13 RX ORDER — MORPHINE SULFATE 2 MG/ML
2 INJECTION, SOLUTION INTRAMUSCULAR; INTRAVENOUS EVERY 10 MIN PRN
Status: DISCONTINUED | OUTPATIENT
Start: 2017-11-13 | End: 2017-11-13 | Stop reason: HOSPADM

## 2017-11-13 RX ORDER — FENOFIBRATE 67 MG/1
134 CAPSULE ORAL DAILY
Status: DISCONTINUED | OUTPATIENT
Start: 2017-11-13 | End: 2017-11-16

## 2017-11-13 RX ORDER — NALOXONE HYDROCHLORIDE 0.4 MG/ML
80 INJECTION, SOLUTION INTRAMUSCULAR; INTRAVENOUS; SUBCUTANEOUS AS NEEDED
Status: DISCONTINUED | OUTPATIENT
Start: 2017-11-13 | End: 2017-11-13 | Stop reason: HOSPADM

## 2017-11-13 RX ORDER — MIDAZOLAM HYDROCHLORIDE 1 MG/ML
INJECTION INTRAMUSCULAR; INTRAVENOUS AS NEEDED
Status: DISCONTINUED | OUTPATIENT
Start: 2017-11-13 | End: 2017-11-13 | Stop reason: SURG

## 2017-11-13 RX ORDER — ENOXAPARIN SODIUM 100 MG/ML
30 INJECTION SUBCUTANEOUS EVERY 12 HOURS SCHEDULED
Status: DISCONTINUED | OUTPATIENT
Start: 2017-11-14 | End: 2017-11-16

## 2017-11-13 RX ORDER — METOCLOPRAMIDE 10 MG/1
10 TABLET ORAL ONCE
Status: DISCONTINUED | OUTPATIENT
Start: 2017-11-13 | End: 2017-11-13 | Stop reason: HOSPADM

## 2017-11-13 RX ORDER — HALOPERIDOL 5 MG/ML
0.25 INJECTION INTRAMUSCULAR ONCE AS NEEDED
Status: DISCONTINUED | OUTPATIENT
Start: 2017-11-13 | End: 2017-11-13 | Stop reason: HOSPADM

## 2017-11-13 RX ORDER — HYDROMORPHONE HYDROCHLORIDE 1 MG/ML
0.6 INJECTION, SOLUTION INTRAMUSCULAR; INTRAVENOUS; SUBCUTANEOUS EVERY 5 MIN PRN
Status: DISCONTINUED | OUTPATIENT
Start: 2017-11-13 | End: 2017-11-13 | Stop reason: HOSPADM

## 2017-11-13 RX ORDER — ATORVASTATIN CALCIUM 20 MG/1
20 TABLET, FILM COATED ORAL NIGHTLY
Status: DISCONTINUED | OUTPATIENT
Start: 2017-11-13 | End: 2017-11-16

## 2017-11-13 RX ORDER — ONDANSETRON 2 MG/ML
4 INJECTION INTRAMUSCULAR; INTRAVENOUS ONCE AS NEEDED
Status: DISCONTINUED | OUTPATIENT
Start: 2017-11-13 | End: 2017-11-13 | Stop reason: HOSPADM

## 2017-11-13 RX ORDER — ONDANSETRON 2 MG/ML
INJECTION INTRAMUSCULAR; INTRAVENOUS AS NEEDED
Status: DISCONTINUED | OUTPATIENT
Start: 2017-11-13 | End: 2017-11-13 | Stop reason: SURG

## 2017-11-13 RX ORDER — HYDROMORPHONE HYDROCHLORIDE 1 MG/ML
0.4 INJECTION, SOLUTION INTRAMUSCULAR; INTRAVENOUS; SUBCUTANEOUS EVERY 5 MIN PRN
Status: DISCONTINUED | OUTPATIENT
Start: 2017-11-13 | End: 2017-11-13 | Stop reason: HOSPADM

## 2017-11-13 RX ORDER — HYDROMORPHONE HYDROCHLORIDE 1 MG/ML
0.2 INJECTION, SOLUTION INTRAMUSCULAR; INTRAVENOUS; SUBCUTANEOUS EVERY 5 MIN PRN
Status: DISCONTINUED | OUTPATIENT
Start: 2017-11-13 | End: 2017-11-13 | Stop reason: HOSPADM

## 2017-11-13 RX ORDER — ROCURONIUM BROMIDE 10 MG/ML
INJECTION, SOLUTION INTRAVENOUS AS NEEDED
Status: DISCONTINUED | OUTPATIENT
Start: 2017-11-13 | End: 2017-11-13 | Stop reason: SURG

## 2017-11-13 RX ORDER — MORPHINE SULFATE 4 MG/ML
6 INJECTION, SOLUTION INTRAMUSCULAR; INTRAVENOUS EVERY 2 HOUR PRN
Status: ACTIVE | OUTPATIENT
Start: 2017-11-13 | End: 2017-11-14

## 2017-11-13 RX ORDER — HYDROCODONE BITARTRATE AND ACETAMINOPHEN 5; 325 MG/1; MG/1
1 TABLET ORAL AS NEEDED
Status: DISCONTINUED | OUTPATIENT
Start: 2017-11-13 | End: 2017-11-13 | Stop reason: HOSPADM

## 2017-11-13 RX ORDER — DOCUSATE SODIUM 100 MG/1
100 CAPSULE, LIQUID FILLED ORAL 2 TIMES DAILY
Status: DISCONTINUED | OUTPATIENT
Start: 2017-11-13 | End: 2017-11-16

## 2017-11-13 RX ORDER — ROPIVACAINE HYDROCHLORIDE 5 MG/ML
INJECTION, SOLUTION EPIDURAL; INFILTRATION; PERINEURAL AS NEEDED
Status: DISCONTINUED | OUTPATIENT
Start: 2017-11-13 | End: 2017-11-13 | Stop reason: SURG

## 2017-11-13 RX ORDER — MORPHINE SULFATE 4 MG/ML
4 INJECTION, SOLUTION INTRAMUSCULAR; INTRAVENOUS EVERY 10 MIN PRN
Status: DISCONTINUED | OUTPATIENT
Start: 2017-11-13 | End: 2017-11-13 | Stop reason: HOSPADM

## 2017-11-13 RX ORDER — SODIUM CHLORIDE 0.9 % (FLUSH) 0.9 %
10 SYRINGE (ML) INJECTION AS NEEDED
Status: DISCONTINUED | OUTPATIENT
Start: 2017-11-13 | End: 2017-11-16

## 2017-11-13 RX ORDER — MORPHINE SULFATE 10 MG/ML
6 INJECTION, SOLUTION INTRAMUSCULAR; INTRAVENOUS EVERY 10 MIN PRN
Status: DISCONTINUED | OUTPATIENT
Start: 2017-11-13 | End: 2017-11-13 | Stop reason: HOSPADM

## 2017-11-13 RX ORDER — FAMOTIDINE 20 MG/1
20 TABLET ORAL ONCE
Status: COMPLETED | OUTPATIENT
Start: 2017-11-13 | End: 2017-11-13

## 2017-11-13 RX ORDER — GLYCOPYRROLATE 0.2 MG/ML
INJECTION INTRAMUSCULAR; INTRAVENOUS AS NEEDED
Status: DISCONTINUED | OUTPATIENT
Start: 2017-11-13 | End: 2017-11-13 | Stop reason: SURG

## 2017-11-13 RX ADMIN — ROPIVACAINE HYDROCHLORIDE 30 ML: 5 INJECTION, SOLUTION EPIDURAL; INFILTRATION; PERINEURAL at 07:16:00

## 2017-11-13 RX ADMIN — LIDOCAINE HYDROCHLORIDE 50 MG: 10 INJECTION, SOLUTION EPIDURAL; INFILTRATION; INTRACAUDAL; PERINEURAL at 07:29:00

## 2017-11-13 RX ADMIN — ROCURONIUM BROMIDE 10 MG: 10 INJECTION, SOLUTION INTRAVENOUS at 07:29:00

## 2017-11-13 RX ADMIN — LIDOCAINE HYDROCHLORIDE 5 ML: 10 INJECTION, SOLUTION EPIDURAL; INFILTRATION; INTRACAUDAL; PERINEURAL at 07:14:00

## 2017-11-13 RX ADMIN — DEXAMETHASONE SODIUM PHOSPHATE 4 MG: 4 MG/ML VIAL (ML) INJECTION at 07:29:00

## 2017-11-13 RX ADMIN — GLYCOPYRROLATE 0.2 MG: 0.2 INJECTION INTRAMUSCULAR; INTRAVENOUS at 08:22:00

## 2017-11-13 RX ADMIN — SODIUM CHLORIDE, SODIUM LACTATE, POTASSIUM CHLORIDE, CALCIUM CHLORIDE: 600; 310; 30; 20 INJECTION, SOLUTION INTRAVENOUS at 09:35:00

## 2017-11-13 RX ADMIN — SODIUM CHLORIDE, SODIUM LACTATE, POTASSIUM CHLORIDE, CALCIUM CHLORIDE: 600; 310; 30; 20 INJECTION, SOLUTION INTRAVENOUS at 07:29:00

## 2017-11-13 RX ADMIN — DEXAMETHASONE SODIUM PHOSPHATE 4 MG: 10 INJECTION, SOLUTION INTRAMUSCULAR; INTRAVENOUS at 07:16:00

## 2017-11-13 RX ADMIN — MIDAZOLAM HYDROCHLORIDE 2 MG: 1 INJECTION INTRAMUSCULAR; INTRAVENOUS at 07:08:00

## 2017-11-13 RX ADMIN — ONDANSETRON 4 MG: 2 INJECTION INTRAMUSCULAR; INTRAVENOUS at 07:29:00

## 2017-11-13 NOTE — H&P
[facility]    History and Physical    [address] Patient Status:  Inpatient    3/5/1955 MRN [id number]   Location [facility] 4W/SW/SE Attending Erick Donato MD   Hosp Day # 0 PCP Gena Hough MD     Date:  2017 Disorder Father    • Diabetes Mother    • Breast Cancer Mother 52     2nd primary @ 79, pos braca   • Cancer Maternal Aunt 70     pancreatic cancer; d.74   • Breast Cancer Maternal Aunt 36     d.53   • Cancer Maternal Grandfather 68     prostate ca; d.76 Musculoskeletal: Arthralgias: Left knee is feeling great after her surgery. Neurological: Negative. Psychiatric/Behavioral: Negative.         Physical Exam:   Vital Signs:  Blood pressure 122/50, pulse 71, temperature 97.6 °F (36.4 °C), temperature s did well.   She is planning to return home with her  to assist      HTN (hypertension)  Blood pressure overall doing quite well to continue to monitor      Dyslipidemia  Continue cholesterol medications      DM type 2 (diabetes mellitus, type 2) ([facility] 75.

## 2017-11-13 NOTE — RESPIRATORY THERAPY NOTE
JOHN ASSESSMENT:    Pt does have a previous diagnosis of JOHN. Pt does routinely use a CPAP device at home. This pt is suspected to be at high risk for JOHN and sleep lab packet was not provided to patient for outpatient follow-up.     CPAP INITIATION:    Pt t

## 2017-11-13 NOTE — ANESTHESIA POSTPROCEDURE EVALUATION
Patient: Joel Guevara    Procedure Summary     Date:  11/13/17 Room / Location:  [facility] OR  / [facility] OR    Anesthesia Start:  9137 Anesthesia Stop:      Procedure:  KNEE TOTAL REPLACEMENT (Right Knee) Diagnosis:  (Primary osteoarthritis right k

## 2017-11-13 NOTE — ANESTHESIA PROCEDURE NOTES
Peripheral Block    Anesthesiologist:  Shanna Rios  Performed by:   Anesthesiologist  Patient Location:  PACU  Start Time:  11/13/2017 7:07 AM  End Time:  11/13/2017 7:16 AM  Site Identification: ultrasound guided, real time ultrasound guided, nerve stimu

## 2017-11-13 NOTE — INTERVAL H&P NOTE
Pre-op Diagnosis: Primary osteoarthritis right knee    The above referenced H&P was reviewed by Norma Estevez MD on 11/13/2017, the patient was examined and no significant changes have occurred in the patient's condition since the H&P was performed.   I dis

## 2017-11-13 NOTE — HOME CARE LIAISON
DIAGNOSES AND PERTINENT MEDICAL HISTORY: RTKA    FACILITY NAME AND DC DATE: [facility]    BEDSIDE VISIT WITH: PTNT    SERVICES ORDERED: RN PT    VERIFIED PATIENT ADDRESS, PHONE NUMBER AND CAREGIVER: YES    PCP IS DR. Eboni Mccollum

## 2017-11-13 NOTE — BRIEF OP NOTE
[address] UNIT  Brief Op Note     Lorne Al Location: OR   CSN [id number] MRN [id number]   Admission Date 11/13/2017 Operation Date 11/13/2017   Attending Physician Jeremy Reno MD Operating Physician Jarvis Li MD

## 2017-11-13 NOTE — ANESTHESIA PREPROCEDURE EVALUATION
Anesthesia PreOp Note    HPI:     Jessica Castillo is a 60year old female who presents for preoperative consultation requested by: Maynor Long MD    Date of Surgery: 11/13/2017    Procedure(s):  KNEE TOTAL REPLACEMENT  Indication: Primary osteoarthri was 2 yrs old  07/10/2017: TOTAL KNEE REPLACEMENT Left      Comment: Fracisco      Prescriptions Prior to Admission:  Glucose Blood In Vitro Strip Diagnosis E11.9 Disp: 100 strip Rfl: 3 11/13/2017 at Unknown time   ONETOUCH ULTRASOFT LANCETS Does not apply Mi Grandfather 68     prostate ca; d.76   • Cancer Paternal Grandfather 61     colon ca   • Breast Cancer Maternal Aunt 79   • Breast Cancer Maternal Cousin Female 27   • Cancer Maternal Cousin Female 46     thyroid ca   • Macular degeneration Neg    • Glauco Mallampati: I  TM distance: >3 FB  Neck ROM: full  Dental - normal exam     Pulmonary - normal exam   Cardiovascular - normal exam    Neuro/Psych - negative ROS     GI/Hepatic/Renal      Endo/Other    Abdominal              Anesthesia Plan:   ASA:  3  Pl

## 2017-11-13 NOTE — CM/SW NOTE
DANE met with the pt. At bedside. The pt. Lives with her  in a raised ranch home with 4 steps to enter. The pt. Reports being independent prior to admission with adls, ambulation, working full time at [address] [place] [address] and driving. The pt.  Has 3 children, 2 in [place]

## 2017-11-14 PROCEDURE — 99232 SBSQ HOSP IP/OBS MODERATE 35: CPT | Performed by: INTERNAL MEDICINE

## 2017-11-14 RX ORDER — OXYCODONE AND ACETAMINOPHEN 10; 325 MG/1; MG/1
1 TABLET ORAL
Status: DISCONTINUED | OUTPATIENT
Start: 2017-11-14 | End: 2017-11-16

## 2017-11-14 RX ORDER — WARFARIN SODIUM 5 MG/1
5 TABLET ORAL ONCE
Status: COMPLETED | OUTPATIENT
Start: 2017-11-14 | End: 2017-11-14

## 2017-11-14 RX ORDER — OXYCODONE HYDROCHLORIDE AND ACETAMINOPHEN 5; 325 MG/1; MG/1
1 TABLET ORAL
Status: DISCONTINUED | OUTPATIENT
Start: 2017-11-14 | End: 2017-11-16

## 2017-11-14 RX ORDER — OXYCODONE AND ACETAMINOPHEN 7.5; 325 MG/1; MG/1
1 TABLET ORAL
Status: DISCONTINUED | OUTPATIENT
Start: 2017-11-14 | End: 2017-11-16

## 2017-11-14 NOTE — OCCUPATIONAL THERAPY NOTE
OCCUPATIONAL THERAPY EVALUATION - INPATIENT      Room Number: 438/223-P  Evaluation Date: 11/14/2017  Type of Evaluation: Initial  Presenting Problem:  (R TKA)    Physician Order: IP Consult to Occupational Therapy  Reason for Therapy: ADL/IADL Dysfunction Diverticulosis of large intestine    • High blood pressure    • High cholesterol    • Osteoarthritis    • Other and unspecified hyperlipidemia    • Sleep apnea     USES MACHINE   • Unspecified essential hypertension    • Visual impairment     WEARS GLASSES functional limits    RANGE OF MOTION   Upper extremity ROM is within functional limits     STRENGTH ASSESSMENT  Upper extremity strength is within functional limits     ACTIVITIES OF DAILY LIVING ASSESSMENT  AM-PAC ‘6-Clicks’ Inpatient Daily Activity Short

## 2017-11-14 NOTE — ANESTHESIA POST-OP FOLLOW-UP NOTE
S/p R TKA,femoral nerve block placement   POD # 1 ,doing well   Admits good pain control till 8:00  No pain in injection site no fever   Good movements of affected leg  Site is clean dry intact   D/c from service

## 2017-11-14 NOTE — PROGRESS NOTES
[facility]    Progress Note    [address] Patient Status:  Inpatient    3/5/1955 MRN [id number]   Location [facility] 4W/SW/SE Attending Jameel Mcadams MD   Hosp Day # 1 PCP Conrado Ruggiero MD     Subjective:she feels ok Right knee dressing +, minimal swelling of right leg   Neurological: She is alert and oriented to person, place, and time. Skin: Skin is warm. Psychiatric: She has a normal mood and affect.        Assessment and Plan:      Primary osteoarthritis of ri

## 2017-11-14 NOTE — PHYSICAL THERAPY NOTE
PHYSICAL THERAPY EVALUATION - INPATIENT     Room Number: 602/195-C  Evaluation Date: 11/14/2017  Type of Evaluation: Initial   Physician Order: PT Eval and Treat    Presenting Problem: R TKA  Reason for Therapy: Mobility Dysfunction and Discharge Plann History:   Diagnosis Date   • Blepharitis 2009    per NG Side-OU   • Conjunctivitis 1/26/09    per NG Side-OU   • Depression    • [facility] ([facility]    • Diabetes mellitus ([facility]    • Diverticular disease    • Diverticulitis    • Diverticulosis of large intesti knee -5 to 80. Lower extremity strength is within functional limits LLE. 5/5 R DF. Reports intact sensation.      BALANCE  Static Sitting: Good  Dynamic Sitting: Good  Static Standing: Fair -  Dynamic Standing: Fair -    ADDITIONAL TESTS demonstrate transfers IND from bed, chair and toilet into RW.       Goal #2  Current Status    Goal #3 Patient is able to ambulate 200' with RW with SBA   Goal #3   Current Status    Goal #4 Patient will negotiate 4 stairs with 1 rail with SBA   Goal #4   C

## 2017-11-14 NOTE — PLAN OF CARE
Diabetes/Glucose Control    • Glucose maintained within prescribed range Progressing        HS blood sugar 233.     HEMATOLOGIC - ADULT    • Maintains hematologic stability Progressing    • Free from bleeding injury Progressing        Chantelle received couma

## 2017-11-14 NOTE — PHYSICAL THERAPY NOTE
PHYSICAL THERAPY KNEE TREATMENT NOTE - INPATIENT     Room Number: 490/467-X             Presenting Problem: R TKA    Problem List  Active Problems:    HTN (hypertension)    Dyslipidemia    DM type 2 (diabetes mellitus, type 2) ([facility]    GERD (gastroesophag 46.58%   Standardized Score (AM-PAC Scale): 43.63   CMS Modifier (G-Code): CK    FUNCTIONAL ABILITY STATUS  Gait Assessment   Gait Assistance: Minimum assistance  Distance (ft): 140  Assistive Device: Rolling walker  Pattern: L Decreased stance time  Leticia Branham

## 2017-11-14 NOTE — PROGRESS NOTES
ORTHO SURG: PO#1  AFEB. AM LABS: INR = 1.0, WBC = 13,400, H/H = 11.2 / 34.5,  PLATELETS = 830,168. HEMOVAC OUTPUT OVER LAST TWO 8 HR. SHHIFTS:  20 ML / 35 ML. PAIN IS CONTROLLED, NO NAUSEA, DESIRES TO D/C KEYES.   PE: ALERT, NAD, MOTORS TO RIGHT ANKLE / H

## 2017-11-15 PROCEDURE — 99232 SBSQ HOSP IP/OBS MODERATE 35: CPT | Performed by: INTERNAL MEDICINE

## 2017-11-15 RX ORDER — WARFARIN SODIUM 7.5 MG/1
7.5 TABLET ORAL NIGHTLY
Status: DISCONTINUED | OUTPATIENT
Start: 2017-11-15 | End: 2017-11-16

## 2017-11-15 NOTE — PLAN OF CARE
Diabetes/Glucose Control    • Glucose maintained within prescribed range Progressing        DISCHARGE PLANNING    • Discharge to home or other facility with appropriate resources Progressing        MUSCULOSKELETAL - ADULT    • Maintain proper alignment of

## 2017-11-15 NOTE — PROGRESS NOTES
[facility]    Progress Note    [address] Patient Status:  Inpatient    3/5/1955 MRN [id number]   Location [facility] 4W/SW/SE Attending Claudene Saa, MD   Hosp Day # 2 PCP Rakesh Valentine MD     Subjective:feels ok , p LCL laxity. No tenderness found. No medial joint line and no lateral joint line tenderness noted. Incision healing well, minimal erythema   Neurological: She is alert and oriented to person, place, and time.  No cranial nerve deficit, sensory deficit or m

## 2017-11-15 NOTE — PHYSICAL THERAPY NOTE
PHYSICAL THERAPY KNEE TREATMENT NOTE - INPATIENT     Room Number: 412/605-L             Presenting Problem: R TKA    Problem List  Active Problems:    HTN (hypertension)    Dyslipidemia    DM type 2 (diabetes mellitus, type 2) ([facility]    GERD (gastroesophag Score:  Raw Score: 18   PT Approx Degree of Impairment Score: 46.58%   Standardized Score (AM-PAC Scale): 43.63   CMS Modifier (G-Code): CK    FUNCTIONAL ABILITY STATUS  Gait Assessment   Gait Assistance: Minimum assistance  Distance (ft): 200  Assistive D

## 2017-11-16 VITALS
BODY MASS INDEX: 43.31 KG/M2 | DIASTOLIC BLOOD PRESSURE: 56 MMHG | SYSTOLIC BLOOD PRESSURE: 142 MMHG | HEIGHT: 63.5 IN | RESPIRATION RATE: 18 BRPM | HEART RATE: 78 BPM | OXYGEN SATURATION: 98 % | TEMPERATURE: 99 F | WEIGHT: 247.5 LBS

## 2017-11-16 PROCEDURE — 99238 HOSP IP/OBS DSCHRG MGMT 30/<: CPT | Performed by: INTERNAL MEDICINE

## 2017-11-16 RX ORDER — ENOXAPARIN SODIUM 100 MG/ML
30 INJECTION SUBCUTANEOUS 2 TIMES DAILY
Qty: 4 SYRINGE | Refills: 0 | Status: SHIPPED | OUTPATIENT
Start: 2017-11-16 | End: 2019-09-30

## 2017-11-16 RX ORDER — OXYCODONE AND ACETAMINOPHEN 7.5; 325 MG/1; MG/1
1 TABLET ORAL EVERY 4 HOURS PRN
Qty: 60 TABLET | Refills: 0 | Status: SHIPPED | OUTPATIENT
Start: 2017-11-16 | End: 2019-09-30

## 2017-11-16 RX ORDER — WARFARIN SODIUM 2 MG/1
TABLET ORAL
Qty: 60 TABLET | Refills: 0 | Status: SHIPPED | OUTPATIENT
Start: 2017-11-16 | End: 2019-09-30

## 2017-11-16 NOTE — PROGRESS NOTES
ORTHO SURG: PO#3  AFEB. AM LABS: INR = 1.5, WBC = 8,000, H/H = 10.5 / 32.4, PLATELETS = 127,213. PAIN CONTROL WITH PERCOCET 7.5 / 325.  REPORTS RIGHT KNEE FLEXION TO > 70 DEGREES THIS AM. PE: UP IN RECLINER, ALERT, NAD, RIGHT KNEE WOUNDS AND DRESSINGS ARE 1

## 2017-11-16 NOTE — DISCHARGE SUMMARY
DISCHARGE SUMMARY     [address] Patient Status:  Inpatient    3/5/1955 MRN [id number]   Location [facility] 4W/SW/SE Attending Hardik Hawk MD   Hosp Day # 3 PCP Michelet Benavides MD     Date of Admission: 2017  Date of Discharge: Neck: Normal range of motion. Neck supple. Cardiovascular: Normal rate and regular rhythm. Exam reveals no gallop and no friction rub. No murmur heard. Pulmonary/Chest: Effort normal and breath sounds normal. No respiratory distress.  She has no wh tablet  Refills:  1            Discharge Plan:  Discharge Condition: Stable    Current Discharge Medication List    Home Meds - Unchanged    Glucose Blood In Vitro Strip  Diagnosis E11.9    ONETOUCH ULTRASOFT LANCETS Does not apply Misc  Diagnosis E11.9

## 2017-11-16 NOTE — CM/SW NOTE
Plan is for discharge home today 11/16 with [facility] and a CPM from [facility]. [facility] is aware of MD orders and discharge plan and CPM has been ordered through [facility]. The pt. Is aware and agreeable.       [facility] [phone]    Ther

## 2017-11-16 NOTE — PROGRESS NOTES
ORTHO SURG: PO#2   PATIENT SEEN IN [address] [place] [address]. Tmax = 98.7. AM LABS : INE = 1.1, WBC = 12,300, H/H = 11.0 / 34.3, PLATELETS = 315,203. PAIN CONTROLLED, INCREASED MOBILITY WITH P.T. INCLUDING STAIRS.   PE: ALERT AT BEDREST WITH RIGHT KNEE CPM IN

## 2017-11-16 NOTE — PLAN OF CARE
Contacted Dr. Nanda Hernandez with an order clarification due to the patient being placed on a coumadin lovenox bridge and only receiving a one time dose for a 5 mg coumadin on 11/14/17.  Clarified with MD and new order was given to start the patient on 7.5 mg of cou

## 2017-11-16 NOTE — PLAN OF CARE
CARDIOVASCULAR - ADULT    • Maintains optimal cardiac output and hemodynamic stability Adequate for Discharge    • Absence of cardiac arrhythmias or at baseline Adequate for Discharge        Diabetes/Glucose Control    • Glucose maintained within prescribe patient.

## 2017-11-16 NOTE — PHYSICAL THERAPY NOTE
PHYSICAL THERAPY KNEE TREATMENT NOTE - INPATIENT     Room Number: 240/794-M             Presenting Problem: R TKA    Problem List  Active Problems:    HTN (hypertension)    Dyslipidemia    DM type 2 (diabetes mellitus, type 2) ([facility]    GERD (gastroesophag a railing?: A Little    AM-PAC Score:  Raw Score: 23   PT Approx Degree of Impairment Score: 11.2%   Standardized Score (AM-PAC Scale): 56.93   CMS Modifier (G-Code): CI    FUNCTIONAL ABILITY STATUS  Gait Assessment   Gait Assistance: Supervision  Distance

## 2017-11-17 ENCOUNTER — ANTI-COAG VISIT (OUTPATIENT)
Dept: INTERNAL MEDICINE CLINIC | Facility: CLINIC | Age: 62
End: 2017-11-17

## 2017-11-17 ENCOUNTER — TELEPHONE (OUTPATIENT)
Dept: MEDSURG UNIT | Facility: HOSPITAL | Age: 62
End: 2017-11-17

## 2017-11-17 ENCOUNTER — PATIENT OUTREACH (OUTPATIENT)
Dept: CASE MANAGEMENT | Age: 62
End: 2017-11-17

## 2017-11-17 ENCOUNTER — TELEPHONE (OUTPATIENT)
Dept: INTERNAL MEDICINE UNIT | Facility: HOSPITAL | Age: 62
End: 2017-11-17

## 2017-11-17 DIAGNOSIS — Z96.652 HISTORY OF TOTAL LEFT KNEE REPLACEMENT: Primary | ICD-10-CM

## 2017-11-17 NOTE — PROGRESS NOTES
Initial Post Discharge Follow Up   Discharge Date: 11/16/17  Contact Date: 11/17/2017    Consent Verification:  Assessment Completed With: Patient  HIPAA Verified?   Yes    Discharge Dx:   S/p R TKA      Medications:     Current Outpatient Prescriptions: anything? yes  • Are there any reasons that keep you from taking your medication as prescribed? No    General:   • How have you been since your discharge from the hospital? Good. I've been taking the pain medication every 4 hours for the most part.    • Do y appointments     Date & Time Appointment Department [facility])    Nov 21, 2017 11:00 AM CST Hospital Follow Up with Damari Gomez MD [address], [address] [facility])    Nov 29, 2017  9:30 AM CST  (Arrive by 9:15 AM) Irwin Mason time.  Wear comfortable, loose fitting clothing.     Dec 07, 2017  4:00 PM [facility] Physical Therapy Visit By Therapist with Papa Simons, [address] [facility] ([address] [facility])    Please arrive at your scheduled ap Physical Therapy Visit By Therapist with Nanda Palomo, [address] [facility] ([address] [facility])    Please arrive at your scheduled appointment time. Wear comfortable, loose fitting clothing.     Dec 28, 2017  4:00 PM CST Services ([address] [facility] [address])    Please arrive at your scheduled appointment time. Wear comfortable, loose fitting clothing.         [facility], [facility], [facility]  Lalo

## 2017-11-17 NOTE — PROGRESS NOTES
Coumadin dosing instruction left in detailed message to Milagro Birmingham RN. Next INR check to be done on 11/20/17. Also relayed Dr. Germain Glenolden message regarding stopping Lovenox per Dr. Lorenzo Dockery message below. Office phone number provided if any questions.

## 2017-11-20 ENCOUNTER — ANTI-COAG VISIT (OUTPATIENT)
Dept: INTERNAL MEDICINE CLINIC | Facility: CLINIC | Age: 62
End: 2017-11-20

## 2017-11-20 DIAGNOSIS — Z96.652 STATUS POST LEFT KNEE REPLACEMENT: Primary | ICD-10-CM

## 2017-11-20 NOTE — PAYOR COMM NOTE
--------------  DISCHARGE REVIEW    Payor: Raul Meraz [address] #:  [id number]  Authorization Number: [id number]    Admit date: 11/13/17  Admit time:  1767  Discharge Date: 11/16/2017  3:42 PM     Admitting Physician: Thelma Mcdaniels is a 29-year-old female with history of diabetes and hypertension. She has had significant bilateral knee pain and has been seeing Dr. Gianna Bell. she has failed conservative therapy.   She had her left knee replaced in July and now presents  for her right kne 3        ASK your doctor about these medications      Instructions Prescription details   aspirin 81 MG Tabs      Take 81 mg by mouth daily.    Refills:  0     atorvastatin 20 MG Tabs  Commonly known as:  LIPITOR      Take 1 tablet (20 mg total) by mouth ni ortho[AE.2]    Follow-up appointment:   No follow-up provider specified.     Vital signs:  Temp:  [97.5 °F (36.4 °C)-98.5 °F (36.9 °C)] 98.5 °F (36.9 °C)  Pulse:  [72-80] 78  Resp:  [18] 18  BP: (124-150)/(52-65) 142/56    ---------------------------------- pain and has been seeing Dr. Rose Marie Alanis. she has failed conservative therapy. She had her left knee replaced in July and now presents today for her right knee replacement. Patient was seen postoperatively in PACU, she is feeling fairly well.   She is awake a [address] 2]      Social History:[KK.1]  Smoking status: Never Smoker                                                              Smokeless tobacco: Never Used                      Alcohol use: Yes              Comment: Sol Crook. 2]    Allergies/Medication well-developed and well-nourished. HENT:   Head: Normocephalic and atraumatic. Eyes: EOM are normal. Pupils are equal, round, and reactive to light. No scleral icterus. Cardiovascular: Normal rate and regular rhythm. Edema not present.   Pulmonary symptoms, no nausea or heartburn. Oliverio Hurt MD  7/10/2017[KK. 1]

## 2017-11-21 ENCOUNTER — OFFICE VISIT (OUTPATIENT)
Dept: INTERNAL MEDICINE CLINIC | Facility: CLINIC | Age: 62
End: 2017-11-21

## 2017-11-21 VITALS
TEMPERATURE: 98 F | HEIGHT: 63 IN | DIASTOLIC BLOOD PRESSURE: 64 MMHG | WEIGHT: 243 LBS | RESPIRATION RATE: 18 BRPM | HEART RATE: 72 BPM | BODY MASS INDEX: 43.05 KG/M2 | SYSTOLIC BLOOD PRESSURE: 121 MMHG

## 2017-11-21 DIAGNOSIS — Z96.651 TOTAL KNEE REPLACEMENT STATUS, RIGHT: Primary | ICD-10-CM

## 2017-11-21 PROCEDURE — 99495 TRANSJ CARE MGMT MOD F2F 14D: CPT | Performed by: INTERNAL MEDICINE

## 2017-11-21 RX ORDER — PANTOPRAZOLE SODIUM 40 MG/1
40 TABLET, DELAYED RELEASE ORAL
Qty: 90 TABLET | Refills: 1 | Status: SHIPPED | OUTPATIENT
Start: 2017-11-21 | End: 2018-06-10 | Stop reason: SDUPTHER

## 2017-11-21 RX ORDER — ATORVASTATIN CALCIUM 20 MG/1
20 TABLET, FILM COATED ORAL NIGHTLY
Qty: 90 TABLET | Refills: 1 | Status: SHIPPED | OUTPATIENT
Start: 2017-11-21 | End: 2017-11-21

## 2017-11-21 RX ORDER — ATORVASTATIN CALCIUM 20 MG/1
20 TABLET, FILM COATED ORAL NIGHTLY
Qty: 90 TABLET | Refills: 1 | Status: SHIPPED | OUTPATIENT
Start: 2017-11-21 | End: 2018-06-10

## 2017-11-21 NOTE — PROGRESS NOTES
HPI:    Alex Carrington is a 60year old female here today for hospital follow up.    She was discharged from Inpatient hospital, [facility]  to Home   Admission Date: 11/13/17   Discharge Date: 11/16/17  Hospital Discharge Diagnosis: No Value exi Sodium 30 MG/0.3ML Subcutaneous Solution Inject 0.3 mL (30 mg total) into the skin 2 (two) times daily. Stop taking after once inr is 2     No current facility-administered medications on file prior to visit.        HISTORY: reconciled and review with tanisha and stridor. Cardiovascular: Positive for leg swelling. Gastrointestinal: Negative for heartburn, vomiting, abdominal pain, constipation, blood in stool, abdominal distention and anal bleeding. Genitourinary: Negative. Musculoskeletal: Negative. low carb diet, accucheck and bring log book next visit , continue with current meds , will check hba1c in 3 months     Other orders  -     Pantoprazole Sodium 40 MG Oral Tab EC;  Take 1 tablet (40 mg total) by mouth every morning before breakfast.  Gualberto Diana following are true:  Communication with the patient was made within 2 business days of discharge on date above   Medical Decision Making- Based on service period of discharge to 30 days:   · Number of Possible Diagnoses and/or Management Options: moderate

## 2017-11-21 NOTE — PATIENT INSTRUCTIONS
How Your Knee Works  A healthy knee bends easily and rotates slightly. The joint absorbs stress and moves smoothly. This allows you to walk, squat, and turn without pain.     A healthy knee  The knee is a hinge joint, formed where the thighbone (femur) an

## 2017-11-22 ENCOUNTER — ANTI-COAG VISIT (OUTPATIENT)
Dept: INTERNAL MEDICINE CLINIC | Facility: CLINIC | Age: 62
End: 2017-11-22

## 2017-11-22 DIAGNOSIS — Z96.652 PRESENCE OF LEFT ARTIFICIAL KNEE JOINT: Primary | ICD-10-CM

## 2017-11-22 PROCEDURE — 36416 COLLJ CAPILLARY BLOOD SPEC: CPT | Performed by: INTERNAL MEDICINE

## 2017-11-22 PROCEDURE — 85610 PROTHROMBIN TIME: CPT | Performed by: INTERNAL MEDICINE

## 2017-11-24 ENCOUNTER — MED REC SCAN ONLY (OUTPATIENT)
Dept: INTERNAL MEDICINE CLINIC | Facility: CLINIC | Age: 62
End: 2017-11-24

## 2017-11-27 ENCOUNTER — ANTI-COAG VISIT (OUTPATIENT)
Dept: INTERNAL MEDICINE CLINIC | Facility: CLINIC | Age: 62
End: 2017-11-27

## 2017-11-27 ENCOUNTER — TELEPHONE (OUTPATIENT)
Dept: INTERNAL MEDICINE CLINIC | Facility: CLINIC | Age: 62
End: 2017-11-27

## 2017-11-27 DIAGNOSIS — Z79.899 MEDICATION MANAGEMENT: Primary | ICD-10-CM

## 2017-11-27 NOTE — PROGRESS NOTES
UZMA RN from [facility] has been informed of pt's coumadin dosage and next INR date, she will instruct the pt to call office to set up appt for next INR.

## 2017-11-29 ENCOUNTER — OFFICE VISIT (OUTPATIENT)
Dept: PHYSICAL THERAPY | Facility: HOSPITAL | Age: 62
End: 2017-11-29
Attending: ORTHOPAEDIC SURGERY
Payer: COMMERCIAL

## 2017-11-29 DIAGNOSIS — Z96.651 STATUS POST REVISION OF TOTAL KNEE REPLACEMENT, RIGHT: ICD-10-CM

## 2017-11-29 PROCEDURE — 97161 PT EVAL LOW COMPLEX 20 MIN: CPT

## 2017-11-29 PROCEDURE — 97140 MANUAL THERAPY 1/> REGIONS: CPT

## 2017-11-29 NOTE — PROGRESS NOTES
LOWER EXTREMITY EVALUATION:   Referring Physician: Dr. 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 Patient presents ambulating with us of SC and demonstrating a mild R antalgic gait and mild RLE circumduction during swing. Current functional limitations are noted above.  Objective testing demonstrates decreased R R knee joint and globally around incision site    AROM: R/L, Knee   Left Right   Flexion 104 90, pain   Extension -4 -16, pain     Accessory motion: mild limitation in patellar mobility noted on R    LE Strength: -/5, R/L   11/29/2017   Hip Flexion 4/4   H of AD by 50%. 5. Pt to improve FOTO outcomes score to less than or equal to 25% impairment, for functional improvement in ADLs. Currently 67% impaired.      Patient Goal: \"be able to ambulate household/community distances without difficulty, go up/down s

## 2017-11-30 ENCOUNTER — OFFICE VISIT (OUTPATIENT)
Dept: PHYSICAL THERAPY | Facility: HOSPITAL | Age: 62
End: 2017-11-30
Attending: ORTHOPAEDIC SURGERY
Payer: COMMERCIAL

## 2017-11-30 DIAGNOSIS — Z96.651 STATUS POST REVISION OF TOTAL KNEE REPLACEMENT, RIGHT: ICD-10-CM

## 2017-11-30 PROCEDURE — 97140 MANUAL THERAPY 1/> REGIONS: CPT

## 2017-11-30 PROCEDURE — 97110 THERAPEUTIC EXERCISES: CPT

## 2017-11-30 NOTE — PROGRESS NOTES
Dx: Status post revision of total knee replacement, right (T78.341)          Authorized # of Visits/Insurance:  [employer] PPO  Script Dates: 11/29/17-1/10/18           Next MD visit: none scheduled  Referring MD: Peyman Yap  Fall Risk:  standard         Avni Toscano flexion in order to transfer in/our of car/chair/bed without difficulty. 3. Patient to be able to negotiate 1 FOS reciprocally with use of SC/railing.   4. Patient to demonstrate improved knee extension to -5 degrees to improve tolerance with household/co

## 2017-12-01 ENCOUNTER — ANTI-COAG VISIT (OUTPATIENT)
Dept: INTERNAL MEDICINE CLINIC | Facility: CLINIC | Age: 62
End: 2017-12-01

## 2017-12-01 DIAGNOSIS — Z96.652 STATUS POST LEFT PARTIAL KNEE REPLACEMENT: Primary | ICD-10-CM

## 2017-12-01 PROCEDURE — 36416 COLLJ CAPILLARY BLOOD SPEC: CPT | Performed by: INTERNAL MEDICINE

## 2017-12-01 PROCEDURE — 85610 PROTHROMBIN TIME: CPT | Performed by: INTERNAL MEDICINE

## 2017-12-01 NOTE — PROGRESS NOTES
Informed pt  Dosage of Coumadin 7.5 today and 5 mg Saturday, Sunday and Monday. Next INR check 12/4/17. Verbalized understanding.

## 2017-12-04 ENCOUNTER — OFFICE VISIT (OUTPATIENT)
Dept: PHYSICAL THERAPY | Facility: HOSPITAL | Age: 62
End: 2017-12-04
Attending: ORTHOPAEDIC SURGERY
Payer: COMMERCIAL

## 2017-12-04 ENCOUNTER — ANTI-COAG VISIT (OUTPATIENT)
Dept: INTERNAL MEDICINE CLINIC | Facility: CLINIC | Age: 62
End: 2017-12-04

## 2017-12-04 DIAGNOSIS — Z96.651 STATUS POST REVISION OF TOTAL KNEE REPLACEMENT, RIGHT: ICD-10-CM

## 2017-12-04 DIAGNOSIS — Z96.652 STATUS POST LEFT PARTIAL KNEE REPLACEMENT: Primary | ICD-10-CM

## 2017-12-04 PROCEDURE — 85610 PROTHROMBIN TIME: CPT | Performed by: INTERNAL MEDICINE

## 2017-12-04 PROCEDURE — 97110 THERAPEUTIC EXERCISES: CPT

## 2017-12-04 PROCEDURE — 97140 MANUAL THERAPY 1/> REGIONS: CPT

## 2017-12-04 PROCEDURE — 36416 COLLJ CAPILLARY BLOOD SPEC: CPT | Performed by: INTERNAL MEDICINE

## 2017-12-04 RX ORDER — WARFARIN SODIUM 5 MG/1
TABLET ORAL
Qty: 60 TABLET | Refills: 0 | Status: SHIPPED | OUTPATIENT
Start: 2017-12-04 | End: 2019-09-30

## 2017-12-04 NOTE — PROGRESS NOTES
Coumadin dosing instructions were discussed with patient. Dosing instructions were read back to me accurately. Patient to return in 12/7/17 for recheck of INR.  Appointment scheduled

## 2017-12-05 ENCOUNTER — OFFICE VISIT (OUTPATIENT)
Dept: PHYSICAL THERAPY | Facility: HOSPITAL | Age: 62
End: 2017-12-05
Attending: ORTHOPAEDIC SURGERY
Payer: COMMERCIAL

## 2017-12-05 DIAGNOSIS — Z96.651 STATUS POST REVISION OF TOTAL KNEE REPLACEMENT, RIGHT: ICD-10-CM

## 2017-12-05 PROCEDURE — 97140 MANUAL THERAPY 1/> REGIONS: CPT

## 2017-12-05 PROCEDURE — 97110 THERAPEUTIC EXERCISES: CPT

## 2017-12-05 NOTE — PROGRESS NOTES
Dx: Status post revision of total knee replacement, right (W22.962)          Authorized # of Visits/Insurance:  [employer] PPO  Script Dates: 11/29/17-1/10/18           Next MD visit: 12/14/17  Referring MD: Violet Yap  Fall Risk:  standard         Precautions prop     Neuro-Re-Education        Therapeutic Activity/Self-Care          Assessment: Presents with improvement in pain as compared to last session.  Progressed proximal hip strengthening to standing with patient tolerating well, but requiring frequent sea

## 2017-12-05 NOTE — PROGRESS NOTES
Dx: Status post revision of total knee replacement, right (K18.588)          Authorized # of Visits/Insurance:  [employer] PPO  Script Dates: 11/29/17-1/10/18           Next MD visit: 12/14/17  Referring MD: Avinash Yap  Fall Risk:  standard         Precautions understanding. Plan: Continue to focus on restoring R knee AROM, strength progression, gait, balance, and stair negotiation.      Charges: 1 Man, 2 TherEx       Total Timed Treatment: 40 min  Total Treatment Time: 45 min  Therapist: Sunita Glaser PT, DPT

## 2017-12-06 ENCOUNTER — MED REC SCAN ONLY (OUTPATIENT)
Dept: INTERNAL MEDICINE CLINIC | Facility: CLINIC | Age: 62
End: 2017-12-06

## 2017-12-06 ENCOUNTER — APPOINTMENT (OUTPATIENT)
Dept: PHYSICAL THERAPY | Facility: HOSPITAL | Age: 62
End: 2017-12-06
Attending: ORTHOPAEDIC SURGERY
Payer: COMMERCIAL

## 2017-12-07 ENCOUNTER — OFFICE VISIT (OUTPATIENT)
Dept: PHYSICAL THERAPY | Facility: HOSPITAL | Age: 62
End: 2017-12-07
Attending: ORTHOPAEDIC SURGERY
Payer: COMMERCIAL

## 2017-12-07 ENCOUNTER — ANTI-COAG VISIT (OUTPATIENT)
Dept: INTERNAL MEDICINE CLINIC | Facility: CLINIC | Age: 62
End: 2017-12-07

## 2017-12-07 DIAGNOSIS — Z96.651 TOTAL KNEE REPLACEMENT STATUS, RIGHT: Primary | ICD-10-CM

## 2017-12-07 DIAGNOSIS — Z96.651 STATUS POST REVISION OF TOTAL KNEE REPLACEMENT, RIGHT: ICD-10-CM

## 2017-12-07 PROCEDURE — 97140 MANUAL THERAPY 1/> REGIONS: CPT

## 2017-12-07 PROCEDURE — 36416 COLLJ CAPILLARY BLOOD SPEC: CPT | Performed by: INTERNAL MEDICINE

## 2017-12-07 PROCEDURE — 97110 THERAPEUTIC EXERCISES: CPT

## 2017-12-07 PROCEDURE — 97112 NEUROMUSCULAR REEDUCATION: CPT

## 2017-12-07 PROCEDURE — 85610 PROTHROMBIN TIME: CPT | Performed by: INTERNAL MEDICINE

## 2017-12-07 NOTE — PROGRESS NOTES
Dx: Status post revision of total knee replacement, right (H55.264)          Authorized # of Visits/Insurance:  [employer] PPO  Script Dates: 11/29/17-1/10/18           Next MD visit: 12/14/17  Referring MD: Angel Yap  Fall Risk:  standard         Precautions STM: medial/lateral joint line x5 min  Patellar mobs: pain/TTP along medial side/joint line  LAD EOB   Grade 2 AP/PA R knee mobs  PROM R knee: flexion EOB, extension supine with heel prop LAD EOB   PROM R knee: flexion EOB, extension supine with heel prop Currently 67% impaired.      Patient Goal: \"be able to ambulate household/community distances without difficulty, go up/down stairs reciprocally\"

## 2017-12-11 ENCOUNTER — TELEPHONE (OUTPATIENT)
Dept: INTERNAL MEDICINE CLINIC | Facility: CLINIC | Age: 62
End: 2017-12-11

## 2017-12-11 ENCOUNTER — OFFICE VISIT (OUTPATIENT)
Dept: PHYSICAL THERAPY | Facility: HOSPITAL | Age: 62
End: 2017-12-11
Attending: ORTHOPAEDIC SURGERY
Payer: COMMERCIAL

## 2017-12-11 ENCOUNTER — ANTI-COAG VISIT (OUTPATIENT)
Dept: INTERNAL MEDICINE CLINIC | Facility: CLINIC | Age: 62
End: 2017-12-11

## 2017-12-11 DIAGNOSIS — Z96.651 STATUS POST REVISION OF TOTAL KNEE REPLACEMENT, RIGHT: ICD-10-CM

## 2017-12-11 DIAGNOSIS — Z96.652 STATUS POST LEFT KNEE REPLACEMENT: Primary | ICD-10-CM

## 2017-12-11 PROCEDURE — 97110 THERAPEUTIC EXERCISES: CPT

## 2017-12-11 PROCEDURE — 85610 PROTHROMBIN TIME: CPT | Performed by: INTERNAL MEDICINE

## 2017-12-11 PROCEDURE — 36416 COLLJ CAPILLARY BLOOD SPEC: CPT | Performed by: INTERNAL MEDICINE

## 2017-12-11 PROCEDURE — 97140 MANUAL THERAPY 1/> REGIONS: CPT

## 2017-12-11 NOTE — TELEPHONE ENCOUNTER
Pt states she cannot recheck inr on 12-15-17, can come in on 12-18-17, please dose pt for the weekend.

## 2017-12-11 NOTE — PROGRESS NOTES
Dx: Status post revision of total knee replacement, right (B44.642)          Authorized # of Visits/Insurance:  [employer] PPO  Script Dates: 11/29/17-1/10/18           Next MD visit: 12/14/17  Referring MD: Fransisca Yap  Fall Risk:  standard         Precautions slides x3 min  DKSA calf stretch: gastroc/soleus 5-10s/5 ea  DKSA prone quad stretch x5 min  Heel prop: 3 min, 3# in supine     Manual Passive stretching of R knee: flexion EOB, extension supine   Patellar mobs: I/S, M/L STM: medial/lateral joint line x5 m SC/railing. 4. Patient to demonstrate improved knee extension to -5 degrees to improve tolerance with household/community ambulation and reduce use of AD by 50%.    5. Pt to improve FOTO outcomes score to less than or equal to 25% impairment, for functiona

## 2017-12-12 ENCOUNTER — OFFICE VISIT (OUTPATIENT)
Dept: PHYSICAL THERAPY | Facility: HOSPITAL | Age: 62
End: 2017-12-12
Attending: ORTHOPAEDIC SURGERY
Payer: COMMERCIAL

## 2017-12-12 DIAGNOSIS — Z96.651 STATUS POST REVISION OF TOTAL KNEE REPLACEMENT, RIGHT: ICD-10-CM

## 2017-12-12 PROCEDURE — 97110 THERAPEUTIC EXERCISES: CPT

## 2017-12-12 PROCEDURE — 97140 MANUAL THERAPY 1/> REGIONS: CPT

## 2017-12-12 NOTE — PROGRESS NOTES
Dx: Status post revision of total knee replacement, right (R97.838)          Authorized # of Visits/Insurance:  [employer] PPO  Script Dates: 11/29/17-1/10/18           Next MD visit: 12/14/17  Referring MD: Jannette Yap  Fall Risk:  standard         Precautions Manual LAD EOB   PROM R knee: flexion EOB, extension supine with heel prop LAD EOB   PROM R knee: flexion EOB, extension supine with heel prop  ROM assessment   LAD EOB   PROM R knee: flexion EOB/prone, extension supine with heel prop  Grade 3 patellar m difficulty, go up/down stairs reciprocally\"

## 2017-12-13 ENCOUNTER — OFFICE VISIT (OUTPATIENT)
Dept: PHYSICAL THERAPY | Facility: HOSPITAL | Age: 62
End: 2017-12-13
Attending: ORTHOPAEDIC SURGERY
Payer: COMMERCIAL

## 2017-12-13 DIAGNOSIS — Z96.651 STATUS POST REVISION OF TOTAL KNEE REPLACEMENT, RIGHT: ICD-10-CM

## 2017-12-13 PROCEDURE — 97110 THERAPEUTIC EXERCISES: CPT

## 2017-12-13 PROCEDURE — 97140 MANUAL THERAPY 1/> REGIONS: CPT

## 2017-12-13 NOTE — PROGRESS NOTES
Dx: Status post revision of total knee replacement, right (S18.295)          Authorized # of Visits/Insurance:  [employer] PPO  Script Dates: 11/29/17-1/10/18           Next MD visit: 12/14/17  Referring MD: Naheed Yap  Fall Risk:  standard         Precautions pain -12 -14     Assessment: Demonstrated improved knee flexion as noted above. Exhibited moderate fatigue during static balance activity requiring use of UEs for support throughout as well as brief seated rest breaks between exercises.  Updated HEP to incl

## 2017-12-14 ENCOUNTER — TELEPHONE (OUTPATIENT)
Dept: INTERNAL MEDICINE CLINIC | Facility: CLINIC | Age: 62
End: 2017-12-14

## 2017-12-14 NOTE — TELEPHONE ENCOUNTER
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

## 2017-12-18 ENCOUNTER — OFFICE VISIT (OUTPATIENT)
Dept: PHYSICAL THERAPY | Facility: HOSPITAL | Age: 62
End: 2017-12-18
Attending: ORTHOPAEDIC SURGERY
Payer: COMMERCIAL

## 2017-12-18 DIAGNOSIS — Z96.651 STATUS POST REVISION OF TOTAL KNEE REPLACEMENT, RIGHT: ICD-10-CM

## 2017-12-18 PROCEDURE — 97110 THERAPEUTIC EXERCISES: CPT

## 2017-12-18 PROCEDURE — 97140 MANUAL THERAPY 1/> REGIONS: CPT

## 2017-12-20 ENCOUNTER — OFFICE VISIT (OUTPATIENT)
Dept: PHYSICAL THERAPY | Facility: HOSPITAL | Age: 62
End: 2017-12-20
Attending: ORTHOPAEDIC SURGERY
Payer: COMMERCIAL

## 2017-12-20 DIAGNOSIS — Z96.651 STATUS POST REVISION OF TOTAL KNEE REPLACEMENT, RIGHT: ICD-10-CM

## 2017-12-20 PROCEDURE — 97110 THERAPEUTIC EXERCISES: CPT

## 2017-12-20 PROCEDURE — 97140 MANUAL THERAPY 1/> REGIONS: CPT

## 2017-12-20 NOTE — PROGRESS NOTES
45Dx: Status post revision of total knee replacement, right (U74.912)          Authorized # of Visits/Insurance:  [facility] PPO  Script Dates: 11/29/17-1/10/18           Next MD visit: 1/4/18  Referring MD: Jean-Claude Yap  Fall Risk:  standard         Precautions with ecc return emphasis x10 ea  Prone iso IR (YTB)/ER 5s/10 ea  SAQ 1# x10 ea  Bridge x10  SLR with quad emphasis x10 1#  Total Gym DL squat: top rung 2x10  Lateral walks x4 laps  FSU 6\" x10 R LE only  LSU 6\" x10 RLE only  FSD leading with R LE 4\" x10 tolerance with household/community ambulation and reduce use of AD by 50%. 5. Pt to improve FOTO outcomes score to less than or equal to 25% impairment, for functional improvement in ADLs. Currently 67% impaired.      Patient Goal: \"be able to ambulate h

## 2017-12-21 ENCOUNTER — TELEPHONE (OUTPATIENT)
Dept: INTERNAL MEDICINE CLINIC | Facility: CLINIC | Age: 62
End: 2017-12-21

## 2017-12-21 ENCOUNTER — OFFICE VISIT (OUTPATIENT)
Dept: PHYSICAL THERAPY | Facility: HOSPITAL | Age: 62
End: 2017-12-21
Attending: ORTHOPAEDIC SURGERY
Payer: COMMERCIAL

## 2017-12-21 DIAGNOSIS — Z96.651 STATUS POST REVISION OF TOTAL KNEE REPLACEMENT, RIGHT: ICD-10-CM

## 2017-12-21 PROCEDURE — 97530 THERAPEUTIC ACTIVITIES: CPT

## 2017-12-21 PROCEDURE — 97110 THERAPEUTIC EXERCISES: CPT

## 2017-12-27 ENCOUNTER — OFFICE VISIT (OUTPATIENT)
Dept: PHYSICAL THERAPY | Facility: HOSPITAL | Age: 62
End: 2017-12-27
Attending: ORTHOPAEDIC SURGERY
Payer: COMMERCIAL

## 2017-12-27 DIAGNOSIS — Z96.651 STATUS POST REVISION OF TOTAL KNEE REPLACEMENT, RIGHT: ICD-10-CM

## 2017-12-27 PROCEDURE — 97140 MANUAL THERAPY 1/> REGIONS: CPT

## 2017-12-27 PROCEDURE — 97110 THERAPEUTIC EXERCISES: CPT

## 2017-12-27 NOTE — PROGRESS NOTES
Dx: Status post revision of total knee replacement, right (Q73.526)          Authorized # of Visits/Insurance:  [employer] PPO  Script Dates: 12/21/17-2/5/18            Next MD visit: 1/4/18  Referring MD: Stephan Yap  Fall Risk:  standard         Precautions: non-reciprocal SC/railing        Knee AROM   11/29/17 12/7/2017 12/13/2017 12/20/2017 12/21/2017   Flexion 90, pain 98, pain 100, pain  106, pan   Extension -16, pain -12 -14 -10 -10,  pain     Assessment : Focused treatment on PAAROM.  Mild-mod restriction

## 2017-12-28 ENCOUNTER — OFFICE VISIT (OUTPATIENT)
Dept: PHYSICAL THERAPY | Facility: HOSPITAL | Age: 62
End: 2017-12-28
Attending: ORTHOPAEDIC SURGERY
Payer: COMMERCIAL

## 2017-12-28 DIAGNOSIS — Z96.651 STATUS POST REVISION OF TOTAL KNEE REPLACEMENT, RIGHT: ICD-10-CM

## 2017-12-28 PROCEDURE — 97530 THERAPEUTIC ACTIVITIES: CPT

## 2017-12-28 PROCEDURE — 97140 MANUAL THERAPY 1/> REGIONS: CPT

## 2017-12-28 PROCEDURE — 97112 NEUROMUSCULAR REEDUCATION: CPT

## 2017-12-28 NOTE — PROGRESS NOTES
Dx: Status post revision of total knee replacement, right (I31.129)          Authorized # of Visits/Insurance:  [facility] PPO  Script Dates: 12/21/17-2/5/18            Next MD visit: 1/4/18  Referring MD: Angel Yap  Fall Risk:  standard         Precautions: flexion EOB  Scar mobilization x5 min     Neuro-Re-Education    Nustep UE/LE x10 min, L6  Forward step down off 4\" step 2x10  Tandem Stance: foam x1 min ea  SLS: ground x30 sec ea     Therapeutic Activity/Self-Care  Stair negotiation: 1 FOS  Ascending rec equal to 25% impairment, for functional improvement in ADLs. Currently 67% impaired.  (Not Assessed 12/21/17)    Patient Goal: \"be able to ambulate household/community distances without difficulty, go up/down stairs reciprocally\" (Progressing 12/21/17)

## 2018-01-02 ENCOUNTER — OFFICE VISIT (OUTPATIENT)
Dept: PHYSICAL THERAPY | Facility: HOSPITAL | Age: 63
End: 2018-01-02
Attending: ORTHOPAEDIC SURGERY
Payer: COMMERCIAL

## 2018-01-02 DIAGNOSIS — Z96.651 STATUS POST REVISION OF TOTAL KNEE REPLACEMENT, RIGHT: ICD-10-CM

## 2018-01-02 PROCEDURE — 97110 THERAPEUTIC EXERCISES: CPT

## 2018-01-02 PROCEDURE — 97140 MANUAL THERAPY 1/> REGIONS: CPT

## 2018-01-02 NOTE — PROGRESS NOTES
Dx: Status post revision of total knee replacement, right (N39.495)          Authorized # of Visits/Insurance:  [employer] PPO  Script Dates: 12/21/17-2/5/18            Next MD visit: 1/4/18  Referring MD: Srinivas Yap  Fall Risk:  standard         Precautions: 12/20/2017 12/21/2017   Flexion 90, pain 98, pain 100, pain  106, pan   Extension -16, pain -12 -14 -10 -10,  pain     Assessment : Patient continues to ambulate with use of SC and exhibits a mod R antalgic gait, decreased heel strike bilat R>L, and occasi

## 2018-01-03 ENCOUNTER — OFFICE VISIT (OUTPATIENT)
Dept: PHYSICAL THERAPY | Facility: HOSPITAL | Age: 63
End: 2018-01-03
Attending: ORTHOPAEDIC SURGERY
Payer: COMMERCIAL

## 2018-01-03 DIAGNOSIS — Z96.651 STATUS POST REVISION OF TOTAL KNEE REPLACEMENT, RIGHT: ICD-10-CM

## 2018-01-03 PROCEDURE — 97110 THERAPEUTIC EXERCISES: CPT

## 2018-01-03 PROCEDURE — 97140 MANUAL THERAPY 1/> REGIONS: CPT

## 2018-01-03 NOTE — PROGRESS NOTES
Dx: Status post revision of total knee replacement, right (Y91.044)          Authorized # of Visits/Insurance:  [employer] PPO  Script Dates: 12/21/17-2/5/18            Next MD visit: 1/4/18  Referring MD: Jose Yap  Fall Risk:  standard         Precautions: x10 min, L6  Forward step down off 4\" step 2x10  Tandem Stance: foam x1 min ea  SLS: ground x30 sec ea       Therapeutic Activity/Self-Care  Stair negotiation: 4 1/2 FOS  Ascending reciprocal: SC/railing  Descending: non-reciprocal SC/railing         Knee 12/21/17)    Patient Goal: \"be able to ambulate household/community distances without difficulty, go up/down stairs reciprocally\" (Progressing 12/21/17)

## 2018-01-04 ENCOUNTER — OFFICE VISIT (OUTPATIENT)
Dept: PHYSICAL THERAPY | Facility: HOSPITAL | Age: 63
End: 2018-01-04
Attending: ORTHOPAEDIC SURGERY
Payer: COMMERCIAL

## 2018-01-04 DIAGNOSIS — Z96.651 STATUS POST REVISION OF TOTAL KNEE REPLACEMENT, RIGHT: ICD-10-CM

## 2018-01-04 PROCEDURE — 97112 NEUROMUSCULAR REEDUCATION: CPT

## 2018-01-04 PROCEDURE — 97110 THERAPEUTIC EXERCISES: CPT

## 2018-01-04 PROCEDURE — 97140 MANUAL THERAPY 1/> REGIONS: CPT

## 2018-01-04 NOTE — PROGRESS NOTES
Dx: Status post revision of total knee replacement, right (A13.597)          Authorized # of Visits/Insurance:  [facility] PPO  Script Dates: 12/21/17-2/5/18            Next MD visit: 2/1/18  Referring MD: Sarah Yap  Fall Risk:  standard         Precautions: Manual LAD R knee EOB   PROM R knee: flexion EOB/prone, knee ext in supine  IASTM, STM R hamstrings in prone PROM: R knee flexion EOB  Scar mobilization x5 min PROM: R knee flexion EOB/prone, extension supine  STM: R hamstring in prone PROM: R knee flexi in/our of car/chair/bed without difficulty. (Met 12/21/17)  3. Patient to be able to negotiate 1 FOS reciprocally with use of SC/railing. (Progressing 12/21/17)  4.  Patient to demonstrate improved knee extension to -5 degrees to improve tolerance with hous

## 2018-01-08 ENCOUNTER — TELEPHONE (OUTPATIENT)
Dept: PHYSICAL THERAPY | Facility: HOSPITAL | Age: 63
End: 2018-01-08

## 2018-01-08 ENCOUNTER — APPOINTMENT (OUTPATIENT)
Dept: PHYSICAL THERAPY | Facility: HOSPITAL | Age: 63
End: 2018-01-08
Attending: ORTHOPAEDIC SURGERY
Payer: COMMERCIAL

## 2018-01-10 ENCOUNTER — APPOINTMENT (OUTPATIENT)
Dept: PHYSICAL THERAPY | Facility: HOSPITAL | Age: 63
End: 2018-01-10
Attending: ORTHOPAEDIC SURGERY
Payer: COMMERCIAL

## 2018-01-11 ENCOUNTER — OFFICE VISIT (OUTPATIENT)
Dept: PHYSICAL THERAPY | Facility: HOSPITAL | Age: 63
End: 2018-01-11
Attending: ORTHOPAEDIC SURGERY
Payer: COMMERCIAL

## 2018-01-11 DIAGNOSIS — Z96.651 STATUS POST REVISION OF TOTAL KNEE REPLACEMENT, RIGHT: ICD-10-CM

## 2018-01-11 PROCEDURE — 97140 MANUAL THERAPY 1/> REGIONS: CPT

## 2018-01-11 PROCEDURE — 97110 THERAPEUTIC EXERCISES: CPT

## 2018-01-11 NOTE — PROGRESS NOTES
Dx: Status post revision of total knee replacement, right (Q54.494)          Authorized # of Visits/Insurance:  [employer] PPO  Script Dates: 12/21/17-2/5/18            Next MD visit: 2/1/18  Referring MD: Kim Yap  Fall Risk:  standard         Precautions: x10 min        Manual PROM: R knee flexion EOB/prone, extension supine PROM: knee flexion EOB and prone  Scar mobilization with IASTM x5 min   -distal aspect more limited in mobility PROM: knee flexion EOB and prone, extension supine  Scar mobilization x5 Currently 67% impaired.  (Not Assessed 12/21/17)    Patient Goal: \"be able to ambulate household/community distances without difficulty, go up/down stairs reciprocally\" (Progressing 12/21/17)

## 2018-01-15 ENCOUNTER — OFFICE VISIT (OUTPATIENT)
Dept: PHYSICAL THERAPY | Facility: HOSPITAL | Age: 63
End: 2018-01-15
Attending: ORTHOPAEDIC SURGERY
Payer: COMMERCIAL

## 2018-01-15 PROCEDURE — 97112 NEUROMUSCULAR REEDUCATION: CPT

## 2018-01-15 PROCEDURE — 97110 THERAPEUTIC EXERCISES: CPT

## 2018-01-15 PROCEDURE — 97140 MANUAL THERAPY 1/> REGIONS: CPT

## 2018-01-15 NOTE — PROGRESS NOTES
Dx: Status post revision of total knee replacement, right (Z55.753)          Authorized # of Visits/Insurance:  [employer] PPO  Script Dates: 12/21/17-2/5/18            Next MD visit: 2/1/18  Referring MD: Avinash Yap  Fall Risk:  standard         Precautions: Stance: foam x1 min     Therapeutic Activity/Self-Care         Knee AROM   1/3/2018 1/15/2018   Flexion 108, pain 105, no assistance   Extension -12, pain  -9, no assistance     Assessment : Demonstrates improved R knee AROM as noted above, without assista

## 2018-01-17 ENCOUNTER — OFFICE VISIT (OUTPATIENT)
Dept: PHYSICAL THERAPY | Facility: HOSPITAL | Age: 63
End: 2018-01-17
Attending: ORTHOPAEDIC SURGERY
Payer: COMMERCIAL

## 2018-01-17 PROCEDURE — 97110 THERAPEUTIC EXERCISES: CPT

## 2018-01-17 PROCEDURE — 97140 MANUAL THERAPY 1/> REGIONS: CPT

## 2018-01-17 NOTE — PROGRESS NOTES
Dx: Status post revision of total knee replacement, right (D91.117)          Authorized # of Visits/Insurance:  [employer] PPO  Script Dates: 12/21/17-2/5/18            Next MD visit: 2/1/18  Referring MD: Stephan Yap  Fall Risk:  standard         Precautions: flexion stretch x5 min  Knee flexion mobs off 6\" step in standing 2x10  Prolonged knee ext stretch in supine: heel prop 5# x5 min       Manual PROM: knee flexion EOB and prone, extension supine  Scar mobilization x5 min PROM: knee flexion EOB, extension s improved knee extension to -5 degrees to improve tolerance with household/community ambulation and reduce use of AD by 50%. (Progressing 12/21/17)  5.  Pt to improve FOTO outcomes score to less than or equal to 25% impairment, for functional improvement in

## 2018-01-22 ENCOUNTER — OFFICE VISIT (OUTPATIENT)
Dept: PHYSICAL THERAPY | Facility: HOSPITAL | Age: 63
End: 2018-01-22
Attending: ORTHOPAEDIC SURGERY
Payer: COMMERCIAL

## 2018-01-22 PROCEDURE — 97112 NEUROMUSCULAR REEDUCATION: CPT

## 2018-01-22 PROCEDURE — 97110 THERAPEUTIC EXERCISES: CPT

## 2018-01-22 NOTE — PROGRESS NOTES
Dx: Status post revision of total knee replacement, right (J94.565)          Authorized # of Visits/Insurance:  [facility] PPO  Script Dates: 12/21/17-2/5/18            Next MD visit: 2/1/18  Referring MD: Status post revision of total knee replacement, right (Z 2x10  Prolonged knee ext stretch in supine: heel prop 5# x5 min   Nustep UE/LE L8 x15 min  Heel slides off EOB x3 min   Total Gym: DL press 2x10, SL ecc return RLE 2x10  Forward step downs: 4\" 2x10  PROM: knee flexion EOB/prone, extension supine       Man (4-6 weeks, 12/21/17-2/1/18)  1. Patient to be independent with progressive HEP during and upon discharge to aide with symptom management and return to previous level of function. (Progressing 12/21/17)  2.  Patient to demonstrate 100 degrees of R knee flex

## 2018-01-24 ENCOUNTER — OFFICE VISIT (OUTPATIENT)
Dept: PHYSICAL THERAPY | Facility: HOSPITAL | Age: 63
End: 2018-01-24
Attending: ORTHOPAEDIC SURGERY
Payer: COMMERCIAL

## 2018-01-24 PROCEDURE — 97110 THERAPEUTIC EXERCISES: CPT

## 2018-01-24 PROCEDURE — 97140 MANUAL THERAPY 1/> REGIONS: CPT

## 2018-01-24 NOTE — PROGRESS NOTES
Dx: Status post revision of total knee replacement, right (A04.962)          Authorized # of Visits/Insurance:  [facility] PPO  Script Dates: 12/21/17-2/5/18            Next MD visit: 2/1/18  Referring MD: Status post revision of total knee replacement, right (Z return RLE 2x10  Forward step downs: 4\" 2x10  PROM: knee flexion EOB/prone, extension supine   Nustep UE/LE L8 x10 min  Seated heel slides off table with towel x2 min  Lateral/Forward walks over cones with exaggerated hip/knee flexion x4 laps ea  Swing ph upon discharge to aide with symptom management and return to previous level of function. (Progressing 12/21/17)  2. Patient to demonstrate 100 degrees of R knee flexion in order to transfer in/our of car/chair/bed without difficulty. (Met 12/21/17)  3.  Ariana Sabillon

## 2018-01-29 ENCOUNTER — OFFICE VISIT (OUTPATIENT)
Dept: PHYSICAL THERAPY | Facility: HOSPITAL | Age: 63
End: 2018-01-29
Attending: ORTHOPAEDIC SURGERY
Payer: COMMERCIAL

## 2018-01-29 PROCEDURE — 97112 NEUROMUSCULAR REEDUCATION: CPT

## 2018-01-29 PROCEDURE — 97110 THERAPEUTIC EXERCISES: CPT

## 2018-01-29 NOTE — PROGRESS NOTES
Dx: Status post revision of total knee replacement, right (J84.321)          Authorized # of Visits/Insurance:  [facility] PPO  Script Dates: 12/21/17-2/5/18            Next MD visit: 2/1/18  Referring MD: Status post revision of total knee replacement, right (Z press 2x10, SL ecc return RLE 2x10  Forward step downs: 4\" 2x10  PROM: knee flexion EOB/prone, extension supine   Nustep UE/LE L8 x10 min  Seated heel slides off table with towel x2 min  Lateral/Forward walks over cones with exaggerated hip/knee flexion x quadriceps/eccentric control, gait, balance, and improving technique with stair negotiation.      Charges: 2 TherEx, 1 NMR       Total Timed Treatment: 50 min  Total Treatment Time: 55 min  Therapist: Jordana Bennett PT, DPT    Long Term Goals Timeframe (4-6 w

## 2018-01-30 RX ORDER — WARFARIN SODIUM 5 MG/1
TABLET ORAL
Qty: 60 TABLET | Refills: 0 | OUTPATIENT
Start: 2018-01-30

## 2018-01-31 ENCOUNTER — OFFICE VISIT (OUTPATIENT)
Dept: PHYSICAL THERAPY | Facility: HOSPITAL | Age: 63
End: 2018-01-31
Attending: ORTHOPAEDIC SURGERY
Payer: COMMERCIAL

## 2018-01-31 PROCEDURE — 97140 MANUAL THERAPY 1/> REGIONS: CPT

## 2018-01-31 PROCEDURE — 97110 THERAPEUTIC EXERCISES: CPT

## 2018-01-31 NOTE — PROGRESS NOTES
Patient Name: Sabrina Amador  YOB: 1955          MRN number:  [id number]  Date:  1/31/18  Referring Physician:  Chuy Bell  Dx: Status post revision of total knee replacement, right (C41.122)       Authorized # of Visits/Insurance:  [facility] discharge at end of 2 weeks.       Objective:     Observation:  Gait: mild R antalgic, no use of AD  Palpation: minimal TTP noted globally   Sensation: grossly intact throughout B LEs    AROM: R/L, Knee   Left Right   Flexion 105 107, pain   Extension -4 -1 Term Goals Timeframe (4-6 weeks, 1/31/18-2/14/18)  1. Patient to be independent with progressive HEP during and upon discharge to aide with symptom management and return to previous level of function. (Progressing 12/21/17)  2.  Patient to demonstrate 100 d care.    X___________________________________________________ Date____________________

## 2018-02-05 ENCOUNTER — OFFICE VISIT (OUTPATIENT)
Dept: PHYSICAL THERAPY | Facility: HOSPITAL | Age: 63
End: 2018-02-05
Attending: ORTHOPAEDIC SURGERY
Payer: COMMERCIAL

## 2018-02-05 PROCEDURE — 97140 MANUAL THERAPY 1/> REGIONS: CPT

## 2018-02-05 PROCEDURE — 97112 NEUROMUSCULAR REEDUCATION: CPT

## 2018-02-05 PROCEDURE — 97110 THERAPEUTIC EXERCISES: CPT

## 2018-02-05 NOTE — PROGRESS NOTES
Dx: Status post total knee replacement, right (H75.350)          Authorized # of Visits/Insurance:  [facility] PPO  Script Dates: 1/31/18-2/14/18            Next MD visit: 2/1/18  Referring MD: Status post revision of total knee replacement, right (Z37.768)    F stretch in prone x5 min  SB ham curl 2x10  Heel prop: ext 5# x5 min  Nustep UE/LE x10 min, L8    Stair negotiation: 3 FOS, ascend/descend reciprocally with use of railing only    Forward dips off 4\" step 2x10         Manual PROM: knee flexion EOB/prone  I knee extension to -5 degrees to improve tolerance with household/community ambulation and reduce use of AD by 50%. (Progressing 1/31/18)  5. Pt to improve FOTO outcomes score to less than or equal to 25% impairment, for functional improvement in ADLs.  Charles Silveira

## 2018-02-07 ENCOUNTER — OFFICE VISIT (OUTPATIENT)
Dept: PHYSICAL THERAPY | Facility: HOSPITAL | Age: 63
End: 2018-02-07
Attending: ORTHOPAEDIC SURGERY
Payer: COMMERCIAL

## 2018-02-07 PROCEDURE — 97140 MANUAL THERAPY 1/> REGIONS: CPT

## 2018-02-07 PROCEDURE — 97110 THERAPEUTIC EXERCISES: CPT

## 2018-02-07 NOTE — PROGRESS NOTES
Dx: Status post total knee replacement, right (Q81.046)          Authorized # of Visits/Insurance:  [facility] PPO  Script Dates: 1/31/18-2/14/18            Next MD visit: 2/1/18  Referring MD: Status post revision of total knee replacement, right (X13.327)    F 2x10  Heel prop: ext 5# x5 min  Nustep UE/LE x10 min, L8    Stair negotiation: 3 FOS, ascend/descend reciprocally with use of railing only    Forward dips off 4\" step 2x10     Nustep UE/LE x10 min, L8  Stair negotiation: 4 FOS, ascend/descend reciprocally (Progressing 12/21/17)  2. Patient to demonstrate 100 degrees of R knee flexion in order to transfer in/our of car/chair/bed without difficulty. (Met 12/21/17)  3. Patient to be able to negotiate 1 FOS reciprocally with use of SC/railing. (Met 1/31/18)  4.

## 2018-02-12 ENCOUNTER — OFFICE VISIT (OUTPATIENT)
Dept: PHYSICAL THERAPY | Facility: HOSPITAL | Age: 63
End: 2018-02-12
Attending: ORTHOPAEDIC SURGERY
Payer: COMMERCIAL

## 2018-02-12 PROCEDURE — 97112 NEUROMUSCULAR REEDUCATION: CPT

## 2018-02-12 PROCEDURE — 97110 THERAPEUTIC EXERCISES: CPT

## 2018-02-12 PROCEDURE — 97140 MANUAL THERAPY 1/> REGIONS: CPT

## 2018-02-12 NOTE — PROGRESS NOTES
Dx: Status post total knee replacement, right (V69.992)          Authorized # of Visits/Insurance:  [facility] PPO  Script Dates: 1/31/18-2/14/18            Next MD visit: 2/1/18  Referring MD: Status post revision of total knee replacement, right (X13.578)    F x3 min with towel  DKSA quad stretch in prone x5 min  SB ham curl 2x10  Heel prop: ext 5# x5 min  Nustep UE/LE x10 min, L8    Stair negotiation: 3 FOS, ascend/descend reciprocally with use of railing only    Forward dips off 4\" step 2x10     Nustep UE/LE min  Therapist: Benjie Monday PT, DPT    Long Term Goals Timeframe (4-6 weeks, 1/31/18-2/14/18)  1. Patient to be independent with progressive HEP during and upon discharge to aide with symptom management and return to previous level of function.  (Progressi

## 2018-02-14 ENCOUNTER — OFFICE VISIT (OUTPATIENT)
Dept: PHYSICAL THERAPY | Facility: HOSPITAL | Age: 63
End: 2018-02-14
Attending: ORTHOPAEDIC SURGERY
Payer: COMMERCIAL

## 2018-02-14 PROCEDURE — 97110 THERAPEUTIC EXERCISES: CPT

## 2018-02-14 PROCEDURE — 97140 MANUAL THERAPY 1/> REGIONS: CPT

## 2018-02-14 RX ORDER — WARFARIN SODIUM 5 MG/1
TABLET ORAL
Qty: 60 TABLET | Refills: 0 | OUTPATIENT
Start: 2018-02-14

## 2018-02-14 NOTE — PROGRESS NOTES
Patient Name: Julissa Vick  YOB: 1955          MRN number:  [id number]  Date:  2/14/18  Referring Physician:  Henry Zhu  Dx: Status post total knee replacement, right (Z10.631)       Physical Therapy Discharge Summary    Reporting P Strength: R 4+, L 5    Flexibility:   Hip Flexor: mild limitation bilat  Hamstrings: mild L, mild R  Piriformis: mild limitation bilat  Quads: mild L, mild R  Gastroc: mild L , mild R      Today's Treatment   2/14/18 Visit: 27 11/29: Heel prop, heel sli improvement in ADLs. Currently 46% impaired. (Partially Met 2/14/18)    Patient Goal: \"be able to ambulate household/community distances without difficulty, go up/down stairs reciprocally\" (Met 2/14/18)    Plan: Goals met.  Patient discharged to [facility]

## 2018-06-10 RX ORDER — PANTOPRAZOLE SODIUM 40 MG/1
TABLET, DELAYED RELEASE ORAL
Qty: 90 TABLET | Refills: 0 | Status: SHIPPED | OUTPATIENT
Start: 2018-06-10 | End: 2018-08-31

## 2018-06-10 NOTE — TELEPHONE ENCOUNTER
Please review and advise regarding pended refill request as unable to refill per protocol. Pantoprazole refilled per protocol.      Hypertensive Medications  Protocol Criteria:  · Appointment scheduled in the past 6 months or in the next 3 months  · BMP or Value Date   LDL 80 05/30/2017       Lab Results  Component Value Date   ALT 24 06/23/2017     Refill Protocol Appointment Criteria Gastrointestional Medication:    · Appointment scheduled in the past 12 months or in the next 3 months  Recent Outpatient Vi

## 2018-06-11 RX ORDER — ATORVASTATIN CALCIUM 20 MG/1
TABLET, FILM COATED ORAL
Qty: 90 TABLET | Refills: 0 | Status: SHIPPED | OUTPATIENT
Start: 2018-06-11 | End: 2018-08-31

## 2018-06-28 NOTE — OPERATIVE REPORT
[facility]    PATIENT'S NAME: Ileen Romberg   ATTENDING PHYSICIAN: Susan Payton MD   OPERATING PHYSICIAN: oMntana Henriquez.  MD Fracisco   PATIENT ACCOUNT#:   [id number]    LOCATION:  [address] [place] [address] #:   [id number]       DATE OF BIRTH preparation of the right lower extremity from foot to tourniquet was carried out. The area painted with povidone-iodine solution.   Free draping of the right lower limb was applied in standard fashion followed by application of a well-padded short leg posi The anterior cruciate ligament was then resected from its tibial insertion and divided proximally and excised. The anterior horn and body of the lateral meniscus were resected by sharp technique.     With the knee in flexion, an extramedullary tibial rese medial and lateral bone nails. Anterior chamfer cuts followed by posterior femoral condyle cuts and posterior chamfer cuts were then completed. The block was removed. An IM retractor was placed.   Any remaining medial and lateral meniscus tissue were exc tracking to a satisfactory level. A femoral tibial relationship was reassessed. The flexion gap was still determined to be more restricted than the extension gap. It was decided to perform a posterior stabilized implant construct.   Trial components were manual followed by finishing the implantation with impactor and mallet. Excess cement was removed. Compression on the component was maintained with the knee brought to extension.   Pulse saline lavage to the patella was carried out followed by delivery of running 3-0 nylon. The wound drain was secured with 0 silk suture. Sterile petroleum gauze and dry gauze dressings were placed; these were secured with a Thorne cotton compression dressing.   The patient was reversed of anesthesia and extubated uneventfull

## 2018-07-12 ENCOUNTER — TELEPHONE (OUTPATIENT)
Dept: INTERNAL MEDICINE CLINIC | Facility: CLINIC | Age: 63
End: 2018-07-12

## 2018-07-12 NOTE — TELEPHONE ENCOUNTER
Received fax from [facility] regarding CPAP supplies. Form signed by Dr. Sae Willis and faxed back to [phone]. Fax successful.

## 2018-08-05 ENCOUNTER — HOSPITAL ENCOUNTER (OUTPATIENT)
Age: 63
Discharge: HOME | End: 2018-08-05
Attending: FAMILY MEDICINE
Payer: OTHER MISCELLANEOUS

## 2018-08-05 VITALS
RESPIRATION RATE: 19 BRPM | WEIGHT: 250 LBS | BODY MASS INDEX: 43.75 KG/M2 | SYSTOLIC BLOOD PRESSURE: 151 MMHG | OXYGEN SATURATION: 100 % | TEMPERATURE: 98 F | DIASTOLIC BLOOD PRESSURE: 80 MMHG | HEART RATE: 83 BPM | HEIGHT: 63.5 IN

## 2018-08-05 DIAGNOSIS — S80.01XA CONTUSION OF RIGHT KNEE, INITIAL ENCOUNTER: Primary | ICD-10-CM

## 2018-08-05 PROCEDURE — 99212 OFFICE O/P EST SF 10 MIN: CPT

## 2018-08-05 NOTE — ED PROVIDER NOTES
Pt seen in [address]      Stated Complaint: Patient presents with:  Lower Extremity Injury (musculoskeletal)      --------------   RN assessment:     Patient p/w complaints of swelling after trip at work and landing on ri d.73   • Breast Cancer Maternal Aunt 44     d.53   • Cancer Maternal Grandfather 68     prostate ca; d.76   • Cancer Paternal Grandfather 61     colon ca   • Breast Cancer Maternal Aunt 79   • Breast Cancer Maternal Cousin Female 27   • Cancer Maternal Cou and external ear canals, both ears   Nose:   Nares normal, septum midline, mucosa normal, no drainage    or sinus tenderness   Throat:    throat clear, no exudate  Lips, mucosa, and tongue normal; teeth and gums normal   Neck:   Supple, symmetrical, trache

## 2018-08-05 NOTE — ED INITIAL ASSESSMENT (HPI)
Patient is here with complaints of swelling after tripping in the hallway at work and landing on her right knee. She had a knee replacement in that knee nine months ago.

## 2018-08-08 ENCOUNTER — HOSPITAL ENCOUNTER (OUTPATIENT)
Dept: GENERAL RADIOLOGY | Age: 63
Discharge: HOME | End: 2018-08-08
Attending: ORTHOPAEDIC SURGERY
Payer: OTHER MISCELLANEOUS

## 2018-08-08 DIAGNOSIS — S80.00XA KNEE CONTUSION: ICD-10-CM

## 2018-08-08 PROCEDURE — 73560 X-RAY EXAM OF KNEE 1 OR 2: CPT | Performed by: ORTHOPAEDIC SURGERY

## 2018-08-31 ENCOUNTER — OFFICE VISIT (OUTPATIENT)
Dept: INTERNAL MEDICINE CLINIC | Facility: CLINIC | Age: 63
End: 2018-08-31
Payer: COMMERCIAL

## 2018-08-31 VITALS
SYSTOLIC BLOOD PRESSURE: 122 MMHG | BODY MASS INDEX: 44.62 KG/M2 | HEART RATE: 74 BPM | TEMPERATURE: 99 F | DIASTOLIC BLOOD PRESSURE: 83 MMHG | HEIGHT: 63.5 IN | WEIGHT: 255 LBS

## 2018-08-31 DIAGNOSIS — Z12.39 SCREENING FOR BREAST CANCER: ICD-10-CM

## 2018-08-31 DIAGNOSIS — Z23 NEED FOR VACCINATION: ICD-10-CM

## 2018-08-31 DIAGNOSIS — Z00.00 ANNUAL PHYSICAL EXAM: Primary | ICD-10-CM

## 2018-08-31 LAB
ANION GAP SERPL CALC-SCNC: 13 MMOL/L (ref 0–18)
BASOPHILS # BLD: 0.1 K/UL (ref 0–0.2)
BASOPHILS NFR BLD: 1 %
BUN SERPL-MCNC: 15 MG/DL (ref 8–20)
BUN/CREAT SERPL: 19 (ref 10–20)
CALCIUM SERPL-MCNC: 9.6 MG/DL (ref 8.5–10.5)
CHLORIDE SERPL-SCNC: 102 MMOL/L (ref 95–110)
CHOLEST SERPL-MCNC: 136 MG/DL (ref 110–200)
CO2 SERPL-SCNC: 24 MMOL/L (ref 22–32)
CREAT SERPL-MCNC: 0.79 MG/DL (ref 0.5–1.5)
EOSINOPHIL # BLD: 0.2 K/UL (ref 0–0.7)
EOSINOPHIL NFR BLD: 3 %
ERYTHROCYTE [DISTWIDTH] IN BLOOD BY AUTOMATED COUNT: 15.5 % (ref 11–15)
GLUCOSE SERPL-MCNC: 74 MG/DL (ref 70–99)
HCT VFR BLD AUTO: 43.4 % (ref 35–48)
HDLC SERPL-MCNC: 45 MG/DL
HGB BLD-MCNC: 14.2 G/DL (ref 12–16)
LDLC SERPL CALC-MCNC: 53 MG/DL (ref 0–99)
LYMPHOCYTES # BLD: 1.8 K/UL (ref 1–4)
LYMPHOCYTES NFR BLD: 25 %
MCH RBC QN AUTO: 26 PG (ref 27–32)
MCHC RBC AUTO-ENTMCNC: 32.7 G/DL (ref 32–37)
MCV RBC AUTO: 79.5 FL (ref 80–100)
MONOCYTES # BLD: 0.7 K/UL (ref 0–1)
MONOCYTES NFR BLD: 10 %
NEUTROPHILS # BLD AUTO: 4.3 K/UL (ref 1.8–7.7)
NEUTROPHILS NFR BLD: 61 %
NONHDLC SERPL-MCNC: 91 MG/DL
OSMOLALITY UR CALC.SUM OF ELEC: 287 MOSM/KG (ref 275–295)
PLATELET # BLD AUTO: 304 K/UL (ref 140–400)
PMV BLD AUTO: 9.1 FL (ref 7.4–10.3)
POTASSIUM SERPL-SCNC: 3.8 MMOL/L (ref 3.3–5.1)
RBC # BLD AUTO: 5.46 M/UL (ref 3.7–5.4)
SODIUM SERPL-SCNC: 139 MMOL/L (ref 136–144)
TRIGL SERPL-MCNC: 192 MG/DL (ref 1–149)
VIT B12 SERPL-MCNC: 273 PG/ML (ref 181–914)
WBC # BLD AUTO: 7.1 K/UL (ref 4–11)

## 2018-08-31 PROCEDURE — 90471 IMMUNIZATION ADMIN: CPT | Performed by: INTERNAL MEDICINE

## 2018-08-31 PROCEDURE — 90670 PCV13 VACCINE IM: CPT | Performed by: INTERNAL MEDICINE

## 2018-08-31 PROCEDURE — 99396 PREV VISIT EST AGE 40-64: CPT | Performed by: INTERNAL MEDICINE

## 2018-08-31 RX ORDER — PANTOPRAZOLE SODIUM 40 MG/1
TABLET, DELAYED RELEASE ORAL
Qty: 90 TABLET | Refills: 0 | Status: SHIPPED | OUTPATIENT
Start: 2018-08-31 | End: 2018-12-16

## 2018-08-31 RX ORDER — BLOOD-GLUCOSE METER
EACH MISCELLANEOUS
Qty: 1 KIT | Refills: 0 | Status: SHIPPED | OUTPATIENT
Start: 2018-08-31 | End: 2020-12-29 | Stop reason: ALTCHOICE

## 2018-08-31 RX ORDER — ATORVASTATIN CALCIUM 20 MG/1
TABLET, FILM COATED ORAL
Qty: 90 TABLET | Refills: 0 | Status: SHIPPED | OUTPATIENT
Start: 2018-08-31 | End: 2018-12-17

## 2018-08-31 NOTE — PROGRESS NOTES
HPI:   Keerthi Meier is a 58year old female who presents for a complete physical exam. She denies any complains. She checks her blood sugar and is well controlled she is taking metformin 100 bid .     Wt Readings from Last 3 Encounters:  08/31/18 : 25 Disp: 60 tablet Rfl: 0   Enoxaparin Sodium 30 MG/0.3ML Subcutaneous Solution Inject 0.3 mL (30 mg total) into the skin 2 (two) times daily.  Stop taking after once inr is 2 Disp: 4 Syringe Rfl: 0   Warfarin Sodium 2 MG Oral Tab Take as Directed Disp: 60 tab Alcohol use: Yes              Comment: SOCIALLY    Exercise: Scott Paula Diet: watches sugar closely. She is exercising three times a week for 30 min. REVIEW OF SYSTEMS:     Constitutional Negative Chills, fatigue and fever.    ENMT Negative Hearing lo wheezes or rales   Cardiovascular Normal Regular rate and rhythm. No murmurs, gallops, or rubs   Vascular Normal Pulses - Dorsalis pedis: Normal. Bruits - Carotids: Absent. Extremity Normal No edema. No varicosities.    Abdomen Normal Inspection normal,

## 2018-09-01 LAB — HBA1C MFR BLD: 6.7 % (ref 4–6)

## 2018-09-05 RX ORDER — LANCETS
EACH MISCELLANEOUS
Qty: 100 EACH | Refills: 3 | Status: SHIPPED | OUTPATIENT
Start: 2018-09-05 | End: 2020-12-29 | Stop reason: ALTCHOICE

## 2018-09-05 NOTE — TELEPHONE ENCOUNTER
Patient requesting refill for test strips and lancets. Stated checks sugar four times a day. Pending if ok to refill.

## 2018-10-29 ENCOUNTER — TELEPHONE (OUTPATIENT)
Dept: INTERNAL MEDICINE CLINIC | Facility: CLINIC | Age: 63
End: 2018-10-29

## 2018-10-29 NOTE — TELEPHONE ENCOUNTER
Patient states she had a knee replacement in 2017 her orthopedic gave her a year supply of antibiotics before any dental work.  She has now ran out she sees them every 3 months she is asking for a prescription from pcp to be send for amoxicillin 500 mg  Jared

## 2018-12-17 RX ORDER — PANTOPRAZOLE SODIUM 40 MG/1
TABLET, DELAYED RELEASE ORAL
Qty: 90 TABLET | Refills: 0 | Status: SHIPPED | OUTPATIENT
Start: 2018-12-17 | End: 2019-03-11

## 2018-12-17 RX ORDER — ATORVASTATIN CALCIUM 20 MG/1
TABLET, FILM COATED ORAL
Qty: 90 TABLET | Refills: 0 | Status: SHIPPED | OUTPATIENT
Start: 2018-12-17 | End: 2019-03-11

## 2019-03-11 RX ORDER — ATORVASTATIN CALCIUM 20 MG/1
TABLET, FILM COATED ORAL
Qty: 90 TABLET | Refills: 0 | Status: SHIPPED | OUTPATIENT
Start: 2019-03-11 | End: 2019-06-06

## 2019-03-11 RX ORDER — PANTOPRAZOLE SODIUM 40 MG/1
TABLET, DELAYED RELEASE ORAL
Qty: 90 TABLET | Refills: 0 | Status: SHIPPED | OUTPATIENT
Start: 2019-03-11 | End: 2019-06-06

## 2019-03-21 ENCOUNTER — TELEPHONE (OUTPATIENT)
Dept: INTERNAL MEDICINE CLINIC | Facility: CLINIC | Age: 64
End: 2019-03-21

## 2019-06-06 RX ORDER — PANTOPRAZOLE SODIUM 40 MG/1
TABLET, DELAYED RELEASE ORAL
Qty: 90 TABLET | Refills: 1 | Status: SHIPPED | OUTPATIENT
Start: 2019-06-06 | End: 2019-11-27

## 2019-06-06 RX ORDER — ATORVASTATIN CALCIUM 20 MG/1
TABLET, FILM COATED ORAL
Qty: 90 TABLET | Refills: 1 | Status: SHIPPED | OUTPATIENT
Start: 2019-06-06 | End: 2019-11-27

## 2019-06-06 NOTE — TELEPHONE ENCOUNTER
Cholesterol Medications  Protocol Criteria:  · Appointment scheduled in the past 12 months or in the next 3 months  · ALT & LDL on file in the past 12 months  · ALT result < 80  · LDL result <130   Recent Outpatient Visits            9 months ago Annual ph

## 2019-06-06 NOTE — TELEPHONE ENCOUNTER
Refill passed per [facility] protocol.   Refill Protocol Appointment Criteria  · Appointment scheduled in the past 6 months or in the next 3 months  Recent Outpatient Visits            9 months ago Annual physical exam    [facility], [address] [place] [address]

## 2019-06-21 RX ORDER — BLOOD SUGAR DIAGNOSTIC
STRIP MISCELLANEOUS
Qty: 300 STRIP | Refills: 1 | Status: SHIPPED | OUTPATIENT
Start: 2019-06-21 | End: 2020-12-29 | Stop reason: ALTCHOICE

## 2019-06-21 NOTE — TELEPHONE ENCOUNTER
Refill passed per [facility] protocol.   Diabetic Supplies  Protocol Criteria:  · Appointment scheduled in past 12 months or the next 3 months

## 2019-07-05 NOTE — MR AVS SNAPSHOT
[facility]  [address] [facility] [address]  [phone]               Thank you for choosing us for your health care visit with Theresa Edwards MD.  We are glad to serve you and happy to provide you with this summary of your where the brachial artery (the blood vessel in the middle of the arm at the inner side of the elbow) should line up with the cuff. Look in your monitor's instruction booklet for an illustration.  You can also bring your cuff to your healthcare provider and first prick the side of your finger with a tiny lancet to draw a tiny drop of blood onto the test strip. Some glucose meters let you use another place on your body to test. But these other places should not be used in some cases as they may be inaccurate. healthcare appointments. This can help your healthcare team make changes to your treatment plan, if needed. This may involve making changes in what you eat, what medicines you take, or how much you exercise.   To learn more  The resources below can help you [id number] [facility] [address]     Phone:  [phone]    - Pantoprazole Sodium 40 MG Tbec            Today's Orders     Mammo Screening BILATERAL (W/CAD)    Complete by:  Feb 17, 2017 (Approximate)    Assoc Dx:  Osteoarthritis, unspecified osteoarthritis type, unspec Your physician has referred you to a specialist.  Your physician or the clinic staff will provide you with the phone number you should call to schedule your appointment.      If you are confident that your benefit plan will not require a referral or authori Now link in the [employer]. Enter your [employer] Activation Code exactly as it appears below along with your Zip Code and Date of Birth to complete the sign-up process. If you do not sign up before the expiration date, you must request a new code.     Ward Barlow ? Take care when walking on gravel or grassy surfaces. ? Avoid walking on snowy or icy surfaces. ? Use a cane or walker (indoors and out) if you are unsteady on your feet.         Healthy Diet and Regular Exercise  The [address] fo [employer]

## 2019-07-19 RX ORDER — AMOXICILLIN 500 MG/1
TABLET, FILM COATED ORAL
Qty: 12 TABLET | Refills: 0 | Status: SHIPPED | OUTPATIENT
Start: 2019-07-19 | End: 2019-09-30 | Stop reason: ALTCHOICE

## 2019-07-19 NOTE — TELEPHONE ENCOUNTER
Review pended refill request as it does not fall under a protocol.     Last Rx:  10/29/18  LOV:  8/31/18

## 2019-08-20 ENCOUNTER — TELEPHONE (OUTPATIENT)
Dept: CARDIOLOGY CLINIC | Facility: CLINIC | Age: 64
End: 2019-08-20

## 2019-08-30 ENCOUNTER — HOSPITAL ENCOUNTER (OUTPATIENT)
Dept: MAMMOGRAPHY | Age: 64
Discharge: HOME | End: 2019-08-30
Attending: INTERNAL MEDICINE
Payer: COMMERCIAL

## 2019-08-30 DIAGNOSIS — Z12.39 SCREENING FOR BREAST CANCER: ICD-10-CM

## 2019-08-30 PROCEDURE — 77067 SCR MAMMO BI INCL CAD: CPT | Performed by: INTERNAL MEDICINE

## 2019-08-30 PROCEDURE — 77063 BREAST TOMOSYNTHESIS BI: CPT | Performed by: INTERNAL MEDICINE

## 2019-09-06 NOTE — TELEPHONE ENCOUNTER
Please review; protocol failed.     Pt due for OV-  CSS please contact pt to schedule    Pt due for labs      Routed to MD for review

## 2019-09-30 ENCOUNTER — OFFICE VISIT (OUTPATIENT)
Dept: FAMILY MEDICINE CLINIC | Facility: CLINIC | Age: 64
End: 2019-09-30
Payer: COMMERCIAL

## 2019-09-30 VITALS
HEART RATE: 79 BPM | OXYGEN SATURATION: 98 % | WEIGHT: 262.38 LBS | DIASTOLIC BLOOD PRESSURE: 78 MMHG | HEIGHT: 63.5 IN | RESPIRATION RATE: 18 BRPM | BODY MASS INDEX: 45.91 KG/M2 | SYSTOLIC BLOOD PRESSURE: 126 MMHG | TEMPERATURE: 98 F

## 2019-09-30 DIAGNOSIS — H60.502 ACUTE OTITIS EXTERNA OF LEFT EAR, UNSPECIFIED TYPE: Primary | ICD-10-CM

## 2019-09-30 PROCEDURE — 99213 OFFICE O/P EST LOW 20 MIN: CPT | Performed by: NURSE PRACTITIONER

## 2019-09-30 RX ORDER — OFLOXACIN 3 MG/ML
5 SOLUTION AURICULAR (OTIC) 2 TIMES DAILY
Qty: 1 BOTTLE | Refills: 0 | Status: SHIPPED | OUTPATIENT
Start: 2019-09-30 | End: 2019-10-07

## 2019-09-30 NOTE — PROGRESS NOTES
CHIEF COMPLAINT:   Patient presents with:  Ear Pain: Started Saturday night, felt pain and inflamation in left ear. HPI:   Irma Pizarroelor is a 71year old female who presents to clinic today with complaints of left ear pain.  Has had for 3  days Benazepril-Hydrochlorothiazide (LOTENSIN HCT) 10-12.5 MG Oral Tab Take 1 tablet by mouth daily. Disp: 90 tablet Rfl: 1   aspirin 81 MG Oral Tab Take 81 mg by mouth daily.    Disp:  Rfl:       Past Medical History:   Diagnosis Date   • Blepharitis 2009    pe NECK: supple, non-tender  LUNGS: clear to auscultation bilaterally, no wheezes or rhonchi. Breathing is non labored. CARDIO: RRR without murmur  EXTREMITIES: no cyanosis, clubbing or edema  LYMPH: no lymphadenopathy.         Cerumen Removal Procedure  Sofiya Please follow up with PCP within next 1 week to see if resolved. If ear pain worsens or persists follow up immediately.          External Ear Infection (Adult)    External otitis (also called “swimmer’s ear”) is an infection in the ear canal. It is often ca Follow up with your healthcare provider in 1 week, or as advised.   When to seek medical advice  Call your healthcare provider right away if any of these occur:  · Ear pain becomes worse or doesn’t improve after 3 days of treatment  · Redness or swelling of For ear canal infections, gently pull the outer ear upward and backward to help the drops flow down into the ear canal. For middle ear infections, press the skin-covered cartilage in the front part of the ear 4 times in a pumping motion to allow the drops What should I tell my health care provider before I take this medicine?   They need to know if you have any of these conditions:  · difficulty hearing  · an unusual or allergic reaction to ofloxacin, quinolone antibiotics, other medicines, foods, dyes, or p

## 2019-09-30 NOTE — PATIENT INSTRUCTIONS
Please follow up with PCP within next 1 week to see if resolved. If ear pain worsens or persists follow up immediately.          External Ear Infection (Adult)    External otitis (also called “swimmer’s ear”) is an infection in the ear canal. It is often ca provider in 1 week, or as advised.   When to seek medical advice  Call your healthcare provider right away if any of these occur:  · Ear pain becomes worse or doesn’t improve after 3 days of treatment  · Redness or swelling of the outer ear occurs or gets w doctor or health care professional even if you think your condition is better. Talk to your pediatrician regarding the use of this medicine in children.  While this drug may be prescribed for children as young as 7 months of age and older for selected cond drainage from the ear within 6 months. It is important that you keep the infected ear(s) clean and dry. When bathing, try not to get the infected ear(s) wet. Do not go swimming unless your doctor or health care professional has told you otherwise.   To pre

## 2019-10-07 ENCOUNTER — OFFICE VISIT (OUTPATIENT)
Dept: OTOLARYNGOLOGY | Facility: CLINIC | Age: 64
End: 2019-10-07
Payer: COMMERCIAL

## 2019-10-07 VITALS — HEART RATE: 80 BPM | SYSTOLIC BLOOD PRESSURE: 135 MMHG | DIASTOLIC BLOOD PRESSURE: 82 MMHG

## 2019-10-07 DIAGNOSIS — H61.22 IMPACTED CERUMEN OF LEFT EAR: Primary | ICD-10-CM

## 2019-10-07 DIAGNOSIS — H60.332 ACUTE SWIMMER'S EAR OF LEFT SIDE: ICD-10-CM

## 2019-10-07 PROCEDURE — 99203 OFFICE O/P NEW LOW 30 MIN: CPT | Performed by: OTOLARYNGOLOGY

## 2019-10-07 NOTE — PROGRESS NOTES
Javed Gutierrez is a 71year old female. Patient presents with:  Ear Problem: Patient present for follow up visit for wax impaction in left ear      HISTORY OF PRESENT ILLNESS  10/7/2019  Patient  presents with a one-week history of left-sided ear pain. violence:        Fear of current or ex partner: Not on file        Emotionally abused: Not on file        Physically abused: Not on file        Forced sexual activity: Not on file    Other Topics      Concerns:         Service: Not Asked        Blo 1982   •   1985   • COLONOSCOPY  2014   • D & C         • HYSTERECTOMY  1996    Right ovary was left   • KNEE TOTAL REPLACEMENT Right 2017    Performed by Cheryl Hook MD at [address] hematoma noted that I did not displace  tympanic membranes are  normal bilaterally     Audiogram was not done today         Current Outpatient Medications:   •  ofloxacin 0.3 % Otic Solution, Place 5 drops into the left ear 2 (two) times daily for 7 days. ,

## 2019-10-22 NOTE — TELEPHONE ENCOUNTER
Please review; protocol failed.     Requested Prescriptions     Pending Prescriptions Disp Refills   • METFORMIN HCL 1000 MG Oral Tab [Pharmacy Med Name: METFORMIN HCL 1,000 MG TABLET] 180 tablet 1     Sig: TAKE 1 TABLET BY MOUTH TWICE A DAY         Recent

## 2019-11-29 RX ORDER — ATORVASTATIN CALCIUM 20 MG/1
TABLET, FILM COATED ORAL
Qty: 90 TABLET | Refills: 1 | Status: SHIPPED | OUTPATIENT
Start: 2019-11-29 | End: 2020-05-20

## 2019-11-29 RX ORDER — PANTOPRAZOLE SODIUM 40 MG/1
TABLET, DELAYED RELEASE ORAL
Qty: 90 TABLET | Refills: 1 | Status: SHIPPED | OUTPATIENT
Start: 2019-11-29 | End: 2020-05-20

## 2020-03-06 NOTE — TELEPHONE ENCOUNTER
Left message on cell for patient to call back and schedule an appointment in order to refill Benazepril.

## 2020-04-24 ENCOUNTER — TELEPHONE (OUTPATIENT)
Dept: INTERNAL MEDICINE CLINIC | Facility: CLINIC | Age: 65
End: 2020-04-24

## 2020-04-24 NOTE — TELEPHONE ENCOUNTER
Spoke to patient . Patient states she needs to look at her schedule.  She will call later to make a f/u appointment for next week on Thursday or Friday.(tele visit)

## 2020-05-08 ENCOUNTER — TELEPHONE (OUTPATIENT)
Dept: INTERNAL MEDICINE CLINIC | Facility: CLINIC | Age: 65
End: 2020-05-08

## 2020-05-15 ENCOUNTER — TELEMEDICINE (OUTPATIENT)
Dept: INTERNAL MEDICINE CLINIC | Facility: CLINIC | Age: 65
End: 2020-05-15
Payer: COMMERCIAL

## 2020-05-15 ENCOUNTER — NURSE TRIAGE (OUTPATIENT)
Dept: INTERNAL MEDICINE CLINIC | Facility: CLINIC | Age: 65
End: 2020-05-15

## 2020-05-15 DIAGNOSIS — H92.01 RIGHT EAR PAIN: ICD-10-CM

## 2020-05-15 DIAGNOSIS — H10.31 ACUTE BACTERIAL CONJUNCTIVITIS OF RIGHT EYE: Primary | ICD-10-CM

## 2020-05-15 PROCEDURE — 99214 OFFICE O/P EST MOD 30 MIN: CPT | Performed by: INTERNAL MEDICINE

## 2020-05-15 RX ORDER — OFLOXACIN 3 MG/ML
SOLUTION/ DROPS OPHTHALMIC
Qty: 1 BOTTLE | Refills: 0 | Status: SHIPPED | OUTPATIENT
Start: 2020-05-15 | End: 2020-12-29 | Stop reason: ALTCHOICE

## 2020-05-15 RX ORDER — AMOXICILLIN AND CLAVULANATE POTASSIUM 875; 125 MG/1; MG/1
1 TABLET, FILM COATED ORAL 2 TIMES DAILY
Qty: 14 TABLET | Refills: 0 | Status: SHIPPED | OUTPATIENT
Start: 2020-05-15 | End: 2020-05-22

## 2020-05-15 NOTE — TELEPHONE ENCOUNTER
Action Requested: Summary for Provider     []  Critical Lab, Recommendations Needed  [] Need Additional Advice  [x]   FYI    []   Need Orders  [] Need Medications Sent to Pharmacy  []  Other     SUMMARY:  Since yesterday bilateral ears have been hurting.  R

## 2020-05-15 NOTE — PROGRESS NOTES
This is a telemedicine visit with live, interactive video and audio. Patient understands and accepts financial responsibility for any deductible, co-insurance and/or co-pays associated with this service.     SUBJECTIVE     Complaint right earache and ri • TOTAL KNEE REPLACEMENT Left 07/10/2017    Fracisco   • TOTAL KNEE REPLACEMENT Right 11/13/2017    DR. CORTEZ      Family History   Problem Relation Age of Onset   • Diabetes Father    • Heart Disorder Father    • Diabetes Mother    • Breast Cancer Mother 4 2) w/Device Does not apply Kit Diagnosis E11.9 1 kit 0   • Benazepril-Hydrochlorothiazide (LOTENSIN HCT) 10-12.5 MG Oral Tab Take 1 tablet by mouth daily. 90 tablet 1   • aspirin 81 MG Oral Tab Take 81 mg by mouth daily.          Ros: She does report right

## 2020-05-20 RX ORDER — ATORVASTATIN CALCIUM 20 MG/1
TABLET, FILM COATED ORAL
Qty: 90 TABLET | Refills: 1 | Status: SHIPPED | OUTPATIENT
Start: 2020-05-20 | End: 2020-11-13

## 2020-05-20 RX ORDER — PANTOPRAZOLE SODIUM 40 MG/1
TABLET, DELAYED RELEASE ORAL
Qty: 90 TABLET | Refills: 1 | Status: SHIPPED | OUTPATIENT
Start: 2020-05-20 | End: 2020-11-13

## 2020-08-04 ENCOUNTER — TELEPHONE (OUTPATIENT)
Dept: INTERNAL MEDICINE CLINIC | Facility: CLINIC | Age: 65
End: 2020-08-04

## 2020-08-04 RX ORDER — AMOXICILLIN 500 MG/1
TABLET, FILM COATED ORAL
Qty: 12 TABLET | Refills: 0 | Status: SHIPPED | OUTPATIENT
Start: 2020-08-04 | End: 2021-03-18

## 2020-08-04 NOTE — TELEPHONE ENCOUNTER
Dr. Jason Gonzalez on behalf of Dr. Camila Morfin (out of office):    Patient request antibiotic for prophylaxis prior to dentist procedures. HX of knee replacement in 2017. Sees dentist every 3 months. Has appt w/ Dentist 8/13/20.       Pended RX for your veronica

## 2020-08-10 NOTE — PROGRESS NOTES
Coumadin dosing instructions were discussed with patient. Dosing instructions were read back to me accurately. Patient to return in  One week for recheck of INR. Appointment scheduled. MD huynh.

## 2020-11-13 RX ORDER — ATORVASTATIN CALCIUM 20 MG/1
TABLET, FILM COATED ORAL
Qty: 90 TABLET | Refills: 1 | Status: SHIPPED | OUTPATIENT
Start: 2020-11-13 | End: 2021-03-11

## 2020-11-13 RX ORDER — PANTOPRAZOLE SODIUM 40 MG/1
TABLET, DELAYED RELEASE ORAL
Qty: 90 TABLET | Refills: 1 | Status: SHIPPED | OUTPATIENT
Start: 2020-11-13 | End: 2021-03-11

## 2020-12-04 ENCOUNTER — TELEPHONE (OUTPATIENT)
Dept: INTERNAL MEDICINE CLINIC | Facility: CLINIC | Age: 65
End: 2020-12-04

## 2020-12-04 NOTE — TELEPHONE ENCOUNTER
I put the order in for blood work.   She is due for mammogram.  She is due for eye exam.  She is due for physical I put the order for mammogram and ophthalmology as well please schedule

## 2020-12-05 ENCOUNTER — OFFICE VISIT (OUTPATIENT)
Dept: OTOLARYNGOLOGY | Facility: CLINIC | Age: 65
End: 2020-12-05
Payer: COMMERCIAL

## 2020-12-05 ENCOUNTER — OFFICE VISIT (OUTPATIENT)
Dept: AUDIOLOGY | Facility: CLINIC | Age: 65
End: 2020-12-05
Payer: COMMERCIAL

## 2020-12-05 VITALS
TEMPERATURE: 98 F | DIASTOLIC BLOOD PRESSURE: 80 MMHG | BODY MASS INDEX: 45 KG/M2 | WEIGHT: 260 LBS | SYSTOLIC BLOOD PRESSURE: 145 MMHG

## 2020-12-05 DIAGNOSIS — H93.13 TINNITUS, BILATERAL: Primary | ICD-10-CM

## 2020-12-05 DIAGNOSIS — H91.93 BILATERAL HEARING LOSS, UNSPECIFIED HEARING LOSS TYPE: Primary | ICD-10-CM

## 2020-12-05 PROCEDURE — 92567 TYMPANOMETRY: CPT | Performed by: AUDIOLOGIST

## 2020-12-05 PROCEDURE — 92557 COMPREHENSIVE HEARING TEST: CPT | Performed by: AUDIOLOGIST

## 2020-12-05 PROCEDURE — 3079F DIAST BP 80-89 MM HG: CPT | Performed by: OTOLARYNGOLOGY

## 2020-12-05 PROCEDURE — 3077F SYST BP >= 140 MM HG: CPT | Performed by: OTOLARYNGOLOGY

## 2020-12-05 PROCEDURE — 99213 OFFICE O/P EST LOW 20 MIN: CPT | Performed by: OTOLARYNGOLOGY

## 2020-12-05 NOTE — PROGRESS NOTES
Samira Mcdermott is a 76year old female. Patient presents with:  Ear Problem: Pt hx of tinnitus. Hearing problems. HISTORY OF PRESENT ILLNESS  She presents with a history of tinnitus since her youth.   She states that she had lots of ear infections Diverticulitis    • Diverticulosis of large intestine    • High blood pressure    • High cholesterol    • Osteoarthritis    • Other and unspecified hyperlipidemia    • Sleep apnea     USES MACHINE   • Unspecified essential hypertension    • Visual impairme Normal. Pupil - Right: Normal, Left: Normal. Fundus - Right: Normal, Left: Normal.   Neurological Normal Memory - Normal. Cranial nerves - Cranial nerves II through XII grossly intact.    Head/Face Normal Facial features - Normal. Eyebrows - Normal. Skull - Benazepril-Hydrochlorothiazide (LOTENSIN HCT) 10-12.5 MG Oral Tab, Take 1 tablet by mouth daily. , Disp: 90 tablet, Rfl: 1  •  aspirin 81 MG Oral Tab, Take 81 mg by mouth daily.   , Disp: , Rfl:   •  amoxicillin 500 MG Oral Tab, Take 4 tablets before procedu

## 2020-12-05 NOTE — TELEPHONE ENCOUNTER
Pt does not have [facility], please assist with appointment and relay doctor's message.

## 2020-12-05 NOTE — PROGRESS NOTES
AUDIOGRAM     Marybel Al was referred for testing by No ref. provider found. 3/5/1955  [id number]      Otoscopic Inspection:  both ears: no cerumen    Audiometric Test Results: The patient was tested using air and bone audiometry.   The audiometr

## 2020-12-09 NOTE — TELEPHONE ENCOUNTER
Advised patient of Dr. Riky Rivas note, number given to scheduling, and advised to fast 12 hours for blood work. Patient verbalized understanding.  Patient has an appointment with her eye doctor on 12/26/2020 for an eye exam.

## 2020-12-09 NOTE — TELEPHONE ENCOUNTER
1st attempt/left voice mail message for pt to call back. When the patient returns the call please advise her of the below message.   Pt does have a physical scheduled with Dr. Osmany Castellano on 12-29-20 for a physical.

## 2020-12-16 ENCOUNTER — NURSE TRIAGE (OUTPATIENT)
Dept: INTERNAL MEDICINE CLINIC | Facility: CLINIC | Age: 65
End: 2020-12-16

## 2020-12-16 ENCOUNTER — HOSPITAL ENCOUNTER (OUTPATIENT)
Age: 65
Discharge: HOME | End: 2020-12-16
Payer: COMMERCIAL

## 2020-12-16 VITALS
SYSTOLIC BLOOD PRESSURE: 156 MMHG | HEART RATE: 80 BPM | DIASTOLIC BLOOD PRESSURE: 78 MMHG | HEIGHT: 63.5 IN | BODY MASS INDEX: 45.32 KG/M2 | TEMPERATURE: 97 F | WEIGHT: 259 LBS | OXYGEN SATURATION: 97 % | RESPIRATION RATE: 20 BRPM

## 2020-12-16 DIAGNOSIS — H60.501 ACUTE OTITIS EXTERNA OF RIGHT EAR, UNSPECIFIED TYPE: Primary | ICD-10-CM

## 2020-12-16 PROCEDURE — 99213 OFFICE O/P EST LOW 20 MIN: CPT | Performed by: NURSE PRACTITIONER

## 2020-12-16 RX ORDER — CIPROFLOXACIN AND DEXAMETHASONE 3; 1 MG/ML; MG/ML
4 SUSPENSION/ DROPS AURICULAR (OTIC) 2 TIMES DAILY
Qty: 1 BOTTLE | Refills: 0 | Status: SHIPPED | OUTPATIENT
Start: 2020-12-16 | End: 2020-12-26

## 2020-12-16 RX ORDER — AMOXICILLIN 875 MG/1
875 TABLET, COATED ORAL 2 TIMES DAILY
Qty: 14 TABLET | Refills: 0 | Status: SHIPPED | OUTPATIENT
Start: 2020-12-16 | End: 2020-12-23

## 2020-12-16 NOTE — ED PROVIDER NOTES
Patient Seen in: [facility]    History   CC: ear pain  HPI: Lamin Al 76year old female w/ hx DM on Metformin who presents c/o right ear pain first beginning more itchy than painful yesterday evening however progressively worsening i Maternal Grandfather 68        prostate ca; d.76   • Cancer Paternal Grandfather 61        colon ca   • Breast Cancer Maternal Aunt 79   • Breast Cancer Maternal Cousin Female 27   • Cancer Maternal Cousin Female 46        thyroid ca   • Macular degenerati 10-12.5 MG Oral Tab,  Take 1 tablet by mouth daily. aspirin 81 MG Oral Tab,  Take 81 mg by mouth daily. Constitutional and vital signs reviewed.         Physical Exam     ED Triage Vitals [12/16/20 1256]   /78   Pulse 80   Resp 20   Temp moist compresses reviewed as well as analgesia instructions reviewed. Patient is comfortable using over-the-counter and nonpharmacologic modalities only for pain control. She demonstrates understanding of all instruction and agrees with plan of care.   Ca

## 2020-12-16 NOTE — ED INITIAL ASSESSMENT (HPI)
Patient is here with pain in her right ear that started last night. She states as the day went on today the pain has gotten worse.

## 2020-12-16 NOTE — TELEPHONE ENCOUNTER
Action Requested: Summary for Provider     []  Critical Lab, Recommendations Needed  [] Need Additional Advice  []   FYI    []   Need Orders  [] Need Medications Sent to Pharmacy  []  Other     SUMMARY: pt states she has right ear pain rated 10/10 on pain

## 2020-12-17 ENCOUNTER — TELEPHONE (OUTPATIENT)
Dept: OTOLARYNGOLOGY | Facility: CLINIC | Age: 65
End: 2020-12-17

## 2020-12-17 NOTE — TELEPHONE ENCOUNTER
Justina Shah MD  [facility] Clinical Staff             Please see if patient wishes to be seen tomorrow regarding her recent diagnosis of ear infection

## 2020-12-18 ENCOUNTER — OFFICE VISIT (OUTPATIENT)
Dept: OTOLARYNGOLOGY | Facility: CLINIC | Age: 65
End: 2020-12-18
Payer: COMMERCIAL

## 2020-12-18 VITALS
HEART RATE: 67 BPM | DIASTOLIC BLOOD PRESSURE: 81 MMHG | WEIGHT: 259 LBS | SYSTOLIC BLOOD PRESSURE: 152 MMHG | TEMPERATURE: 99 F | BODY MASS INDEX: 45 KG/M2

## 2020-12-18 DIAGNOSIS — H92.01 RIGHT EAR PAIN: Primary | ICD-10-CM

## 2020-12-18 PROCEDURE — 3077F SYST BP >= 140 MM HG: CPT | Performed by: OTOLARYNGOLOGY

## 2020-12-18 PROCEDURE — 3079F DIAST BP 80-89 MM HG: CPT | Performed by: OTOLARYNGOLOGY

## 2020-12-18 PROCEDURE — 99213 OFFICE O/P EST LOW 20 MIN: CPT | Performed by: OTOLARYNGOLOGY

## 2020-12-18 RX ORDER — CYCLOBENZAPRINE HCL 5 MG
5 TABLET ORAL NIGHTLY
Qty: 30 TABLET | Refills: 1 | Status: SHIPPED | OUTPATIENT
Start: 2020-12-18 | End: 2020-12-29 | Stop reason: ALTCHOICE

## 2020-12-18 RX ORDER — CELECOXIB 200 MG/1
200 CAPSULE ORAL DAILY PRN
Qty: 30 CAPSULE | Refills: 0 | Status: SHIPPED | OUTPATIENT
Start: 2020-12-18 | End: 2020-12-29 | Stop reason: ALTCHOICE

## 2020-12-18 NOTE — PROGRESS NOTES
Julissa Whitingshire is a 76year old female. Patient presents with:  Ear Problem: Was seen in urgent care on wednesday for R ear infection. Was prescribed ear drops and amoxicillin. The pain is still present in the R ear. No fever.  No dizziness       HISTO SOCIALLY      Drug use: No    Other Topics      Concerns:        Caffeine Concern:  Yes          rarely      Family History   Problem Relation Age of Onset   • Diabetes Father    • Heart Disorder Father    • Diabetes Mother    • Breast Cancer Mother 52 Drooling. Eyes Negative Blurred vision and vision changes. Respiratory Negative Dyspnea and wheezing. Cardio Negative Chest pain, irregular heartbeat/palpitations and syncope. GI Negative Abdominal pain and diarrhea.    Endocrine Negative Cold intol Medications:   •  celecoxib 200 MG Oral Cap, Take 1 capsule (200 mg total) by mouth daily as needed for Pain., Disp: 30 capsule, Rfl: 0  •  cyclobenzaprine 5 MG Oral Tab, Take 1 tablet (5 mg total) by mouth nightly., Disp: 30 tablet, Rfl: 1  •  ciprofloxac musculoskeletal in nature as her ear exam is relatively unremarkable. No evidence of infection at this time.   Hearing is normal.  I have asked her to start Celebrex as well as cyclobenzaprine before bedtime and to hold off on the antibiotic eardrops and o

## 2020-12-19 ENCOUNTER — HOSPITAL ENCOUNTER (OUTPATIENT)
Dept: MAMMOGRAPHY | Age: 65
Discharge: HOME | End: 2020-12-19
Attending: INTERNAL MEDICINE
Payer: COMMERCIAL

## 2020-12-19 ENCOUNTER — IMMUNIZATION (OUTPATIENT)
Dept: LAB | Facility: HOSPITAL | Age: 65
End: 2020-12-19
Attending: INTERNAL MEDICINE
Payer: COMMERCIAL

## 2020-12-19 DIAGNOSIS — Z12.31 ENCOUNTER FOR SCREENING MAMMOGRAM FOR MALIGNANT NEOPLASM OF BREAST: ICD-10-CM

## 2020-12-19 DIAGNOSIS — Z23 NEED FOR VACCINATION: ICD-10-CM

## 2020-12-19 PROCEDURE — 77063 BREAST TOMOSYNTHESIS BI: CPT | Performed by: INTERNAL MEDICINE

## 2020-12-19 PROCEDURE — 77067 SCR MAMMO BI INCL CAD: CPT | Performed by: INTERNAL MEDICINE

## 2020-12-19 PROCEDURE — 0001A PFIZER-BIONTECH COVID-19 VACCINE: CPT

## 2020-12-23 ENCOUNTER — LAB ENCOUNTER (OUTPATIENT)
Dept: LAB | Facility: HOSPITAL | Age: 65
End: 2020-12-23
Attending: INTERNAL MEDICINE
Payer: COMMERCIAL

## 2020-12-23 DIAGNOSIS — Z00.00 ANNUAL PHYSICAL EXAM: ICD-10-CM

## 2020-12-23 PROCEDURE — 83036 HEMOGLOBIN GLYCOSYLATED A1C: CPT

## 2020-12-23 PROCEDURE — 80053 COMPREHEN METABOLIC PANEL: CPT

## 2020-12-23 PROCEDURE — 80061 LIPID PANEL: CPT

## 2020-12-23 PROCEDURE — 84443 ASSAY THYROID STIM HORMONE: CPT

## 2020-12-23 PROCEDURE — 36415 COLL VENOUS BLD VENIPUNCTURE: CPT

## 2020-12-23 PROCEDURE — 85025 COMPLETE CBC W/AUTO DIFF WBC: CPT

## 2020-12-28 ENCOUNTER — LAB ENCOUNTER (OUTPATIENT)
Dept: LAB | Facility: HOSPITAL | Age: 65
End: 2020-12-28
Attending: INTERNAL MEDICINE
Payer: COMMERCIAL

## 2020-12-28 DIAGNOSIS — E87.6 HYPOKALEMIA: ICD-10-CM

## 2020-12-28 LAB
ANION GAP SERPL CALC-SCNC: 7 MMOL/L (ref 0–18)
BUN BLD-MCNC: 16 MG/DL (ref 7–18)
BUN/CREAT SERPL: 18.6 (ref 10–20)
CALCIUM BLD-MCNC: 8.9 MG/DL (ref 8.5–10.1)
CHLORIDE SERPL-SCNC: 107 MMOL/L (ref 98–112)
CO2 SERPL-SCNC: 26 MMOL/L (ref 21–32)
CREAT BLD-MCNC: 0.86 MG/DL
GLUCOSE BLD-MCNC: 181 MG/DL (ref 70–99)
OSMOLALITY SERPL CALC.SUM OF ELEC: 296 MOSM/KG (ref 275–295)
PATIENT FASTING Y/N/NP: NO
POTASSIUM SERPL-SCNC: 3.8 MMOL/L (ref 3.5–5.1)
SODIUM SERPL-SCNC: 140 MMOL/L (ref 136–145)

## 2020-12-28 PROCEDURE — 36415 COLL VENOUS BLD VENIPUNCTURE: CPT

## 2020-12-28 PROCEDURE — 80048 BASIC METABOLIC PNL TOTAL CA: CPT

## 2020-12-29 ENCOUNTER — OFFICE VISIT (OUTPATIENT)
Dept: INTERNAL MEDICINE CLINIC | Facility: CLINIC | Age: 65
End: 2020-12-29
Payer: COMMERCIAL

## 2020-12-29 VITALS
DIASTOLIC BLOOD PRESSURE: 85 MMHG | SYSTOLIC BLOOD PRESSURE: 138 MMHG | BODY MASS INDEX: 46.72 KG/M2 | HEIGHT: 63.5 IN | HEART RATE: 66 BPM | WEIGHT: 267 LBS

## 2020-12-29 DIAGNOSIS — Z78.0 MENOPAUSE: ICD-10-CM

## 2020-12-29 DIAGNOSIS — E11.00 TYPE 2 DIABETES MELLITUS WITH HYPEROSMOLARITY WITHOUT COMA, WITHOUT LONG-TERM CURRENT USE OF INSULIN (HCC): Primary | ICD-10-CM

## 2020-12-29 DIAGNOSIS — Z23 NEED FOR VACCINATION: ICD-10-CM

## 2020-12-29 PROCEDURE — 90471 IMMUNIZATION ADMIN: CPT | Performed by: INTERNAL MEDICINE

## 2020-12-29 PROCEDURE — 3075F SYST BP GE 130 - 139MM HG: CPT | Performed by: INTERNAL MEDICINE

## 2020-12-29 PROCEDURE — 99397 PER PM REEVAL EST PAT 65+ YR: CPT | Performed by: INTERNAL MEDICINE

## 2020-12-29 PROCEDURE — 3079F DIAST BP 80-89 MM HG: CPT | Performed by: INTERNAL MEDICINE

## 2020-12-29 PROCEDURE — 3008F BODY MASS INDEX DOCD: CPT | Performed by: INTERNAL MEDICINE

## 2020-12-29 PROCEDURE — 90732 PPSV23 VACC 2 YRS+ SUBQ/IM: CPT | Performed by: INTERNAL MEDICINE

## 2020-12-29 NOTE — PROGRESS NOTES
HPI:   Aris Phan is a 76year old female who presents for a complete physical exam.   She is checking her sugar fluctuates , fasting goes up to 160.  She takes her medication regularly  She is having issues with her scalp after she dyes her hair sh hypertension    • Visual impairment     WEARS GLASSES          Past Surgical History:   Procedure Laterality Date   • BSO, OMENTECTOMY W/BETH      1 ovary left   •   1977   •   1982   •   1985   • COLONOSCOPY blood in stool, constipation, diarrhea, heartburn, nausea and vomiting.  Negative Dysuria, hematuria, urinary incontinence. Menses regular, not heavy. Endocrine Negative Cold intolerance and heat intolerance.    Neuro Negative Dizziness, extremity wea Normal.   Psychiatric Normal Orientation - Oriented to time, place, person & situation. Appropriate mood and affect.           ASSESSMENT AND PLAN:   Norwood Shone is a 76year old female who presents for a complete physical exam.  Self breast exam exp

## 2021-01-12 NOTE — TELEPHONE ENCOUNTER
This refill request is being sent to the provider for the following reason:  []Patient has not had an appointment within the past 12 months but has made an appointment on: ___  []Medication is not within protocol  [x]Patient did not complete follow up justine

## 2021-01-13 ENCOUNTER — IMMUNIZATION (OUTPATIENT)
Dept: LAB | Facility: HOSPITAL | Age: 66
End: 2021-01-13
Attending: PREVENTIVE MEDICINE
Payer: COMMERCIAL

## 2021-01-13 ENCOUNTER — TELEPHONE (OUTPATIENT)
Dept: OTOLARYNGOLOGY | Facility: CLINIC | Age: 66
End: 2021-01-13

## 2021-01-13 DIAGNOSIS — Z23 NEED FOR VACCINATION: Primary | ICD-10-CM

## 2021-01-13 PROCEDURE — 0002A SARSCOV2 VAC 30MCG/0.3ML IM: CPT

## 2021-01-13 RX ORDER — CELECOXIB 200 MG/1
CAPSULE ORAL
Qty: 30 CAPSULE | Refills: 0 | Status: SHIPPED | OUTPATIENT
Start: 2021-01-13 | End: 2021-01-13

## 2021-01-13 NOTE — TELEPHONE ENCOUNTER
Per pt missed a call and does not know what it was regarding. Pt states it may be regarding the refill for celebrex which she no longer takes.  Please advise

## 2021-01-25 ENCOUNTER — OFFICE VISIT (OUTPATIENT)
Dept: DERMATOLOGY CLINIC | Facility: CLINIC | Age: 66
End: 2021-01-25
Payer: COMMERCIAL

## 2021-01-25 DIAGNOSIS — L30.9 DERMATITIS: ICD-10-CM

## 2021-01-25 DIAGNOSIS — L65.9 ALOPECIA: Primary | ICD-10-CM

## 2021-01-25 PROCEDURE — 88305 TISSUE EXAM BY PATHOLOGIST: CPT | Performed by: DERMATOLOGY

## 2021-01-25 PROCEDURE — 88313 SPECIAL STAINS GROUP 2: CPT | Performed by: DERMATOLOGY

## 2021-01-25 PROCEDURE — 99202 OFFICE O/P NEW SF 15 MIN: CPT | Performed by: DERMATOLOGY

## 2021-01-25 PROCEDURE — 11104 PUNCH BX SKIN SINGLE LESION: CPT | Performed by: DERMATOLOGY

## 2021-01-25 RX ORDER — CLOBETASOL PROPIONATE 0.46 MG/ML
1 SOLUTION TOPICAL
Qty: 60 ML | Refills: 3 | Status: SHIPPED | OUTPATIENT
Start: 2021-01-25

## 2021-01-25 RX ORDER — KETOCONAZOLE 20 MG/ML
SHAMPOO TOPICAL
Qty: 120 ML | Refills: 3 | Status: SHIPPED | OUTPATIENT
Start: 2021-01-25

## 2021-01-25 NOTE — PATIENT INSTRUCTIONS
Punch biopsy done to rule out possibility of lichen plano pilaris. Please follow-up February 2 at 4:00 to discuss results and for suture removal.  In the meantime you can start the ketoconazole shampoo and the clobetasol solution.   Further plan pending re

## 2021-01-25 NOTE — PROCEDURES
Procedural Report for Punch Biopsy    Procedure: With the patient is a supine position, the skin was scrubbed with alcohol. Anesthesia was obtained by injecting 1 mL of 1% Xylocaine with Epinephrine. A circular punch, 3  mm.  In size was used to incise

## 2021-01-25 NOTE — PROGRESS NOTES
HPI:     Chief Complaint     Derm Problem        HPI     Derm Problem      Additional comments: New Patient present with blistering on scalp after hair coloring . Patient c/o itching on scalp , forehead and ears .  Patient discontinued use of hair coloring • amoxicillin 500 MG Oral Tab Take 4 tablets before procedure 12 tablet 0   • aspirin 81 MG Oral Tab Take 81 mg by mouth daily.          Allergies:     Pollen                  ITCHING    Past Medical History:   Diagnosis Date   • Blepharitis 2009    per N Monthly or less        Comment: SOCIALLY      Drug use: No      Sexual activity: Not on file    Lifestyle      Physical activity        Days per week: Not on file        Minutes per session: Not on file      Stress: Not on file    Relationships      Social Cancer Maternal Cousin Female 46        thyroid ca   • Macular degeneration Neg    • Glaucoma Neg    • Ovarian Cancer Neg        PHYSICAL EXAM:   Patient is alert and oriented and appears their stated age. They are well-nourished.   Exam is remarkable for Referral Orders:  No orders of the defined types were placed in this encounter.         1/25/2021  Sarah Robles

## 2021-02-02 ENCOUNTER — OFFICE VISIT (OUTPATIENT)
Dept: DERMATOLOGY CLINIC | Facility: CLINIC | Age: 66
End: 2021-02-02
Payer: COMMERCIAL

## 2021-02-02 DIAGNOSIS — L66.1 LICHEN PLANOPILARIS: Primary | ICD-10-CM

## 2021-02-02 DIAGNOSIS — L65.9 ALOPECIA: ICD-10-CM

## 2021-02-02 PROBLEM — L66.10 LICHEN PLANOPILARIS: Status: ACTIVE | Noted: 2021-02-02

## 2021-02-02 PROCEDURE — 99213 OFFICE O/P EST LOW 20 MIN: CPT | Performed by: DERMATOLOGY

## 2021-02-02 NOTE — PROGRESS NOTES
HPI:     Chief Complaint     Alopecia        HPI     Alopecia      Additional comments: LOV 1/25/2021 Patient present to f/u on alopecia .  Patient present for sutrue removal . Patient was prescribed RX but has not start use of medication           Last adele hyperlipidemia    • Sleep apnea     USES MACHINE   • Unspecified essential hypertension    • Visual impairment     WEARS GLASSES     Past Surgical History:   Procedure Laterality Date   • BSO, OMENTECTOMY W/BETH  1996 ovary left   •   18 Intimate partner violence        Fear of current or ex partner: Not on file        Emotionally abused: Not on file        Physically abused: Not on file        Forced sexual activity: Not on file    Other Topics      Concerns:         Service: Not lot of cases are idiopathic discussed. Probably some autoimmune etiology. Patient instructed to start using the ketoconazole shampoo for inflammation and the clobetasol solution. She is to follow-up in about 6 weeks.   If no improvement in the perifollic

## 2021-03-11 RX ORDER — PANTOPRAZOLE SODIUM 40 MG/1
40 TABLET, DELAYED RELEASE ORAL
Qty: 90 TABLET | Refills: 1 | Status: SHIPPED | OUTPATIENT
Start: 2021-03-11 | End: 2021-11-15

## 2021-03-11 RX ORDER — ATORVASTATIN CALCIUM 20 MG/1
20 TABLET, FILM COATED ORAL NIGHTLY
Qty: 90 TABLET | Refills: 1 | Status: SHIPPED | OUTPATIENT
Start: 2021-03-11 | End: 2021-11-17

## 2021-03-18 ENCOUNTER — OFFICE VISIT (OUTPATIENT)
Dept: DERMATOLOGY CLINIC | Facility: CLINIC | Age: 66
End: 2021-03-18
Payer: COMMERCIAL

## 2021-03-18 DIAGNOSIS — L66.9 SCARRING ALOPECIA: ICD-10-CM

## 2021-03-18 DIAGNOSIS — L66.1 LICHEN PLANOPILARIS: Primary | ICD-10-CM

## 2021-03-18 PROCEDURE — 99214 OFFICE O/P EST MOD 30 MIN: CPT | Performed by: DERMATOLOGY

## 2021-03-18 RX ORDER — DOXYCYCLINE HYCLATE 100 MG/1
100 CAPSULE ORAL 2 TIMES DAILY
Qty: 60 CAPSULE | Refills: 2 | Status: SHIPPED | OUTPATIENT
Start: 2021-03-18 | End: 2021-06-09

## 2021-03-18 RX ORDER — POTASSIUM CHLORIDE 20 MEQ/1
TABLET, EXTENDED RELEASE ORAL
COMMUNITY
Start: 2020-12-23

## 2021-03-18 NOTE — PROGRESS NOTES
HPI:     Chief Complaint     Derm Problem        HPI     Derm Problem      Additional comments: LOV 2/2/21. 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 Slight itching since previous visit. Slight improvment.  pt states 3 new lesions to scalp since las Allergies:     Pollen                  ITCHING    Past Medical History:   Diagnosis Date   • Blepharitis 2009    per NG Side-OU   • Conjunctivitis 1/26/09    per NG Side-OU   • Depression    • Diabetes ([facility]    • Diabetes mellitus ([facility]    • Diverticul Stress Concern: Not Asked        Weight Concern: Not Asked        Special Diet: Not Asked        Back Care: Not Asked        Exercise: Not Asked        Bike Helmet: Not Asked        Seat Belt: Not Asked        Self-Exams: Not Asked        Grew up on a fa Neg        PHYSICAL EXAM:   patient is alert and oriented and appears their stated age. They are well-nourished. Exam is remarkable for the following-  1. There is diffuse part thinning and scarring alopecia across the crown of the scalp.   2.  There are

## 2021-06-09 RX ORDER — DOXYCYCLINE HYCLATE 100 MG/1
100 CAPSULE ORAL 2 TIMES DAILY
Qty: 60 CAPSULE | Refills: 0 | Status: SHIPPED | OUTPATIENT
Start: 2021-06-09

## 2021-06-09 NOTE — TELEPHONE ENCOUNTER
Pt given a one time refill only- was seen 3/18 and told to f/u 6 weeks- has not come back and no future appts have been made

## 2021-08-26 NOTE — TELEPHONE ENCOUNTER
Refill passed per [employer], [facility] protocol.   Requested Prescriptions   Pending Prescriptions Disp Refills    BENAZEPRIL-HYDROCHLOROTHIAZIDE 10-12.5 MG Oral Tab [Pharmacy Med Name: BENAZEPRIL-HCTZ 10-12.5 MG TAB] 90 tablet 1     Sig: TAKE 1 TABLET BY MOUTH EVERY DAY        Hypertensive Medications Protocol Failed - 8/26/2021  1:12 AM        Failed - Appointment in past 6 or next 3 months        Passed - CMP or BMP in past 12 months        Passed - GFR Non- > 50     Lab Results   Component Value Date    GFRNAA 71 12/28/2020                   Recent Outpatient Visits              5 months ago Lichen planopilaris    [facility] [place] [facility] Batsheva Means MD    Office Visit    6 months ago Lichen planopilaris    [facility] Batsheva Means MD    Office Visit    7 months ago [facility] Dermatology Batsheva Means MD    Office Visit    8 months ago Type 2 diabetes mellitus with hyperosmolarity without coma, without long-term current use of insulin [facility])    Tammi Martínez MD    Office Visit    8 months ago Right ear pain    [facility], [address], Mario Mauricio MD    Office Visit

## 2021-10-02 ENCOUNTER — IMMUNIZATION (OUTPATIENT)
Dept: LAB | Facility: HOSPITAL | Age: 66
End: 2021-10-02
Attending: EMERGENCY MEDICINE
Payer: COMMERCIAL

## 2021-10-02 DIAGNOSIS — Z23 NEED FOR VACCINATION: Primary | ICD-10-CM

## 2021-10-02 PROCEDURE — 0004A SARSCOV2 VAC 30MCG/0.3ML IM: CPT

## 2021-10-02 PROCEDURE — 0003A SARSCOV2 VAC 30MCG/0.3ML IM: CPT

## 2021-11-15 RX ORDER — PANTOPRAZOLE SODIUM 40 MG/1
40 TABLET, DELAYED RELEASE ORAL
Qty: 90 TABLET | Refills: 1 | Status: SHIPPED | OUTPATIENT
Start: 2021-11-15 | End: 2022-01-28

## 2021-11-15 NOTE — TELEPHONE ENCOUNTER
Refill passed per [facility] [facility], [facility] protocol.     Requested Prescriptions   Pending Prescriptions Disp Refills    PANTOPRAZOLE 40 MG Oral Tab EC [Pharmacy Med Name: PANTOPRAZOLE SOD DR 40 MG TAB] 90 tablet 1     Sig: TAKE 1 TABLET BY MOUTH EVERY DAY IN THE MORNING BEFORE BREAKFAST        Gastrointestional Medication Protocol Passed - 11/15/2021 12:23 AM        Passed - Appointment in past 12 or next 3 months                  Recent Outpatient Visits              8 months ago Lichen planopilaris    [facility] Brenda Blanchard MD    Office Visit    9 months ago Lichen planopilaris    [facility] Brenda Blanchard MD    Office Visit    9 months ago [facility] Brenda Blanchard MD    Office Visit    10 months ago Type 2 diabetes mellitus with hyperosmolarity without coma, without long-term current use of insulin [facility])    Rickie Prieto MD    Office Visit    11 months ago Right ear pain    [facility], [address], Vi Mauricio MD    Office Visit

## 2021-11-17 RX ORDER — ATORVASTATIN CALCIUM 20 MG/1
20 TABLET, FILM COATED ORAL NIGHTLY
Qty: 90 TABLET | Refills: 1 | Status: SHIPPED | OUTPATIENT
Start: 2021-11-17 | End: 2022-01-28

## 2021-11-17 NOTE — TELEPHONE ENCOUNTER
Refill passed per [facility], [facility] protocol    Requested Prescriptions   Pending Prescriptions Disp Refills    ATORVASTATIN 20 MG Oral Tab [Pharmacy Med Name: ATORVASTATIN 20 MG TABLET] 90 tablet 1     Sig: TAKE 1 TABLET BY MOUTH EVERY DAY AT NIGHT        C

## 2021-12-10 ENCOUNTER — TELEPHONE (OUTPATIENT)
Dept: INTERNAL MEDICINE CLINIC | Facility: HOSPITAL | Age: 66
End: 2021-12-10

## 2021-12-10 ENCOUNTER — NURSE ONLY (OUTPATIENT)
Dept: LAB | Facility: HOSPITAL | Age: 66
End: 2021-12-10
Attending: PREVENTIVE MEDICINE
Payer: COMMERCIAL

## 2021-12-10 DIAGNOSIS — Z20.822 SUSPECTED 2019 NOVEL CORONAVIRUS INFECTION: ICD-10-CM

## 2021-12-10 DIAGNOSIS — Z20.822 SUSPECTED 2019 NOVEL CORONAVIRUS INFECTION: Primary | ICD-10-CM

## 2021-12-10 LAB — SARS-COV-2 RNA RESP QL NAA+PROBE: NOT DETECTED

## 2021-12-10 NOTE — TELEPHONE ENCOUNTER
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 12/10/21 from home,  Back in the office on 12/13  Did you have close contact with someone on your unit while not wearing a mask? (e.g., during meal breaks):  Yes []   No [x]    If yes, who:   Do you share a w days after exposure.                                                  COVID-19 testing ordered: [x] Rapid    [] Alinity    Date test is to be taken:    12/10/21    []  Outside testing       [] Manager notified    INSTRUCTIONS PROVIDED:    [x] Employee will

## 2021-12-10 NOTE — TELEPHONE ENCOUNTER
Results and RTW guidelines:    COVID RESULT:    [] Viewed by employee in [address] RTW plan and instructions as indicated on triage call. Manager notified. Estimated RTW date:   [] Discussed with employee   [x] Unable to reach by phone.   Employee viewe

## 2022-01-29 NOTE — TELEPHONE ENCOUNTER
Please review; protocol failed/No Protcol    Requested Prescriptions   Pending Prescriptions Disp Refills    metFORMIN HCl 1000 MG Oral Tab 180 tablet 1     Sig: Take 1 tablet (1,000 mg total) by mouth 2 (two) times daily.         Diabetes Medication Protoc Only    10 months ago Lichen planopilaris    [facility] Lui Ward MD    Office Visit    12 months ago Lichen planopilaris    [facility] Lui Ward MD    Office Visit    1 year ago Alopecia    E

## 2022-01-30 RX ORDER — ATORVASTATIN CALCIUM 20 MG/1
20 TABLET, FILM COATED ORAL NIGHTLY
Qty: 90 TABLET | Refills: 1 | Status: SHIPPED | OUTPATIENT
Start: 2022-01-30

## 2022-01-30 RX ORDER — PANTOPRAZOLE SODIUM 40 MG/1
40 TABLET, DELAYED RELEASE ORAL
Qty: 90 TABLET | Refills: 1 | Status: SHIPPED | OUTPATIENT
Start: 2022-01-30

## 2022-03-15 NOTE — PHYSICAL THERAPY NOTE
PHYSICAL THERAPY KNEE TREATMENT NOTE - INPATIENT     Room Number: 428/428-A             Presenting Problem: L TKA    Problem List  Active Problems:    HTN (hypertension)    Dyslipidemia    DM type 2 (diabetes mellitus, type 2) ([facility]    GERD (gastroesophag Sitting down on and standing up from a chair with arms (e.g., wheelchair, bedside commode, etc.): A Little   -   Moving from lying on back to sitting on the side of the bed?: A Little   How much help from another person does the patient currently need. .. independent   Goal #1   Current Status  SBA   Goal #2 Patient is able to demonstrate transfers Sit to/from Stand at assistance level: modified independent   Goal #2  Current Status  SBA   Goal #3 Patient is able to ambulate 300 feet with assistive device a No

## 2022-04-10 ENCOUNTER — IMMUNIZATION (OUTPATIENT)
Dept: LAB | Age: 67
End: 2022-04-10
Attending: EMERGENCY MEDICINE
Payer: COMMERCIAL

## 2022-04-10 DIAGNOSIS — Z23 NEED FOR VACCINATION: Primary | ICD-10-CM

## 2022-04-10 PROCEDURE — 0054A SARSCOV2 VAC 30MCG TRS SUCR: CPT

## 2022-04-27 NOTE — TELEPHONE ENCOUNTER
Please review; protocol failed.     Requested Prescriptions   Pending Prescriptions Disp Refills    BENAZEPRIL-HYDROCHLOROTHIAZIDE 10-12.5 MG Oral Tab [Pharmacy Med Name: BENAZEPRIL-HCTZ 10-12.5 MG TAB] 90 tablet 0     Sig: TAKE 1 TABLET BY MOUTH EVERY DAY        Hypertensive Medications Protocol Failed - 4/27/2022 12:24 AM        Failed - CMP or BMP in past 12 months        Failed - Appointment in past 6 or next 3 months        Passed - GFR Non- > 50     Lab Results   Component Value Date    GFRNAA 71 12/28/2020                           Recent Outpatient Visits              4 months ago Suspected 2019 novel coronavirus infection    [address] [facility] (Ind[facility])    Nurse Only    1 year ago Lichen planopilaris    [facility] Derm[facility]ology Mitzi Morrissey MD    [facility]    [facility]    Office Visit    1 year ago [facility] Dermatology Mitzi Morrissey MD    Office Visit    1 year ago Type 2 diabetes mellitus with hyperosmolarity without coma, without long-term current use of insulin [facility])    Leeann Raza MD    Office Visit

## 2022-04-28 ENCOUNTER — HOSPITAL ENCOUNTER (OUTPATIENT)
Age: 67
Discharge: HOME | End: 2022-04-28
Payer: COMMERCIAL

## 2022-04-28 VITALS
SYSTOLIC BLOOD PRESSURE: 156 MMHG | DIASTOLIC BLOOD PRESSURE: 91 MMHG | OXYGEN SATURATION: 98 % | RESPIRATION RATE: 18 BRPM | HEART RATE: 88 BPM | TEMPERATURE: 97 F

## 2022-04-28 DIAGNOSIS — L03.019 CELLULITIS OF FINGER, UNSPECIFIED LATERALITY: ICD-10-CM

## 2022-04-28 DIAGNOSIS — M79.645 FINGER PAIN, LEFT: Primary | ICD-10-CM

## 2022-04-28 PROCEDURE — 99213 OFFICE O/P EST LOW 20 MIN: CPT | Performed by: EMERGENCY MEDICINE

## 2022-04-28 RX ORDER — ASPIRIN 81 MG/1
81 TABLET ORAL DAILY
COMMUNITY

## 2022-04-28 NOTE — ED INITIAL ASSESSMENT (HPI)
Pt c/o swelling, tightness, pain to L 5th finger. Denies any injury or falls. No fever. Hx of cellulitis in past.  Good ROM noted. Positive CMS. Tex called requesting that you put in another referral to endocrine for him.  He doesn't want to see the endocrinologist that he was scheduled with as there was a problem with the office being extremely rude ect.  Would you beable to put another referral in for this pt of Dr Pemberton?

## 2022-06-07 RX ORDER — PANTOPRAZOLE SODIUM 40 MG/1
40 TABLET, DELAYED RELEASE ORAL
Qty: 90 TABLET | Refills: 1 | Status: SHIPPED | OUTPATIENT
Start: 2022-06-07

## 2022-06-07 RX ORDER — ATORVASTATIN CALCIUM 20 MG/1
20 TABLET, FILM COATED ORAL NIGHTLY
Qty: 90 TABLET | Refills: 1 | Status: SHIPPED | OUTPATIENT
Start: 2022-06-07

## 2022-08-16 ENCOUNTER — OFFICE VISIT (OUTPATIENT)
Dept: INTERNAL MEDICINE CLINIC | Facility: CLINIC | Age: 67
End: 2022-08-16
Payer: COMMERCIAL

## 2022-08-16 VITALS
OXYGEN SATURATION: 96 % | BODY MASS INDEX: 48.55 KG/M2 | WEIGHT: 274 LBS | HEART RATE: 85 BPM | DIASTOLIC BLOOD PRESSURE: 80 MMHG | SYSTOLIC BLOOD PRESSURE: 135 MMHG | HEIGHT: 63 IN | TEMPERATURE: 97 F

## 2022-08-16 DIAGNOSIS — Z12.31 ENCOUNTER FOR SCREENING MAMMOGRAM FOR MALIGNANT NEOPLASM OF BREAST: ICD-10-CM

## 2022-08-16 DIAGNOSIS — Z78.0 MENOPAUSE: ICD-10-CM

## 2022-08-16 DIAGNOSIS — Z00.00 ANNUAL PHYSICAL EXAM: Primary | ICD-10-CM

## 2022-08-16 DIAGNOSIS — E11.00 TYPE 2 DIABETES MELLITUS WITH HYPEROSMOLARITY WITHOUT COMA, WITHOUT LONG-TERM CURRENT USE OF INSULIN (HCC): ICD-10-CM

## 2022-08-16 PROCEDURE — 3079F DIAST BP 80-89 MM HG: CPT | Performed by: INTERNAL MEDICINE

## 2022-08-16 PROCEDURE — 3075F SYST BP GE 130 - 139MM HG: CPT | Performed by: INTERNAL MEDICINE

## 2022-08-16 PROCEDURE — 3008F BODY MASS INDEX DOCD: CPT | Performed by: INTERNAL MEDICINE

## 2022-08-16 PROCEDURE — 99397 PER PM REEVAL EST PAT 65+ YR: CPT | Performed by: INTERNAL MEDICINE

## 2022-08-16 RX ORDER — AMOXICILLIN 500 MG/1
CAPSULE ORAL
COMMUNITY
Start: 2022-08-11

## 2022-09-17 ENCOUNTER — OFFICE VISIT (OUTPATIENT)
Dept: DERMATOLOGY CLINIC | Facility: CLINIC | Age: 67
End: 2022-09-17
Payer: COMMERCIAL

## 2022-09-17 DIAGNOSIS — L82.0 INFLAMED SEBORRHEIC KERATOSIS: Primary | ICD-10-CM

## 2022-09-17 DIAGNOSIS — D23.9 BENIGN NEOPLASM OF SKIN, UNSPECIFIED LOCATION: ICD-10-CM

## 2022-09-17 DIAGNOSIS — L81.4 LENTIGO: ICD-10-CM

## 2022-09-17 DIAGNOSIS — D22.9 MULTIPLE NEVI: ICD-10-CM

## 2022-09-17 DIAGNOSIS — L82.1 SK (SEBORRHEIC KERATOSIS): ICD-10-CM

## 2022-09-19 ENCOUNTER — TELEPHONE (OUTPATIENT)
Dept: INTERNAL MEDICINE CLINIC | Facility: HOSPITAL | Age: 67
End: 2022-09-19

## 2022-09-19 ENCOUNTER — LAB ENCOUNTER (OUTPATIENT)
Dept: LAB | Age: 67
End: 2022-09-19
Attending: PREVENTIVE MEDICINE

## 2022-09-19 DIAGNOSIS — Z20.822 SUSPECTED COVID-19 VIRUS INFECTION: Primary | ICD-10-CM

## 2022-09-19 DIAGNOSIS — Z20.822 SUSPECTED COVID-19 VIRUS INFECTION: ICD-10-CM

## 2022-09-19 LAB — SARS-COV-2 RNA RESP QL NAA+PROBE: NOT DETECTED

## 2022-09-30 RX ORDER — LANCETS
EACH MISCELLANEOUS
Qty: 100 EACH | Refills: 3 | Status: SHIPPED | OUTPATIENT
Start: 2022-09-30

## 2022-09-30 RX ORDER — PERPHENAZINE 16 MG/1
TABLET, FILM COATED ORAL
Qty: 100 STRIP | Refills: 3 | Status: SHIPPED | OUTPATIENT
Start: 2022-09-30

## 2022-09-30 NOTE — TELEPHONE ENCOUNTER
Refill passed per [employer] [employer] protocol. Requested Prescriptions   Pending Prescriptions Disp Refills    CONTOUR NEXT TEST In Vitro Strip [Pharmacy Med Name: CONTOUR NEXT TEST STRIP] 100 strip 0     Sig: TEST BLOOD SUGAR DAILY        Diabetic Supplies Protocol Passed - 9/29/2022  5:38 PM        Passed - In person appointment or virtual visit in the past 12 mos or appointment in next 3 mos       Recent Outpatient Visits              1 week ago Inflamed seborrheic keratosis    [facility] Jerel Crocker MD    Office Visit    1 month ago Annual physical exam    [employer] Claudette Cower, MD    Office Visit    9 months ago Suspected 2019 novel coronavirus infection    [facility] (Indoor Clinic)    Nurse Only    1 year ago [facility] [facility] Prema Godoy MD    Office Visit    1 year ago Lichen planopilaris    [facility] Derm[facility] Prema Godoy MD    Office Visit     Future Appointments         Provider Department Appt Notes    In 1 week [facility] access hospital [facility] OF THE [address] The last mammogram I had was at the [facility] location in late December 2020.     In 1 month [facility] RM1; [address] [facility] [place] if I have any signs of osteoporosis                  MICROLET LANCETS Does not apply Misc [Pharmacy Med Name: Forrestine Yamel  0     Sig: TEST ONCE DAILY        Diabetic Supplies Protocol Passed - 9/29/2022  5:38 PM        Passed - In person appointment or virtual visit in the past 12 mos or appointment in next 3 mos       Recent Outpatient Visits              1 week ago Inflamed seborrheic keratosis    [facility] Jerel Crocker MD    Office Visit    1 month ago Annual physical exam    [employer], Gabriela Madrigal MD    Office Visit    9 months ago Suspected 2019 novel coronavirus infection    [address] [facility])    Nurse Only    1 year ago Lichen planopilaris    [facility] Vashti Palencia MD    Office Visit    1 year ago Lichen planopilaris    [facility] Vashti Palencia MD    Office Visit     Future Appointments         Provider Department Appt Notes    In 1 week [facility] OF THE [address] The last mammogram I had was at the [facility] location in late December 2020. In 1 month H[facility] RM1; [address] [facility] if I have any signs of osteoporosis                   Future Appointments         Provider Department Appt Notes    In 1 week [facility] OF THE [address] The last mammogram I had was at the [facility] location in late December 2020.     In 1 month [facility] RM1; HND DEXA [address] if I have any signs of osteoporosis          Recent Outpatient Visits              1 week ago Inflamed seborrheic keratosis    [facility] Derm[facility]ology Elmer Lopes MD    Office Visit    1 month ago Annual physical exam    [employer], [address] Shoshana Bolivar MD    Office Visit    9 months ago Suspected 2019 novel coronavirus infection    [address] [facility])    Nurse Only    1 year ago Lichen planopilaris    [facility] D[facility]ology Vashti Palencia MD    Office Visit    1 year ago Lichen planopilaris    [facility] Dermat[facility] Vashti Palencia MD    Office Visit

## 2022-10-08 ENCOUNTER — HOSPITAL ENCOUNTER (OUTPATIENT)
Dept: MAMMOGRAPHY | Facility: HOSPITAL | Age: 67
Discharge: HOME | End: 2022-10-08
Attending: INTERNAL MEDICINE
Payer: COMMERCIAL

## 2022-10-08 DIAGNOSIS — Z12.31 ENCOUNTER FOR SCREENING MAMMOGRAM FOR MALIGNANT NEOPLASM OF BREAST: ICD-10-CM

## 2022-10-08 PROCEDURE — 77063 BREAST TOMOSYNTHESIS BI: CPT | Performed by: INTERNAL MEDICINE

## 2022-10-08 PROCEDURE — 77067 SCR MAMMO BI INCL CAD: CPT | Performed by: INTERNAL MEDICINE

## 2022-10-20 NOTE — TELEPHONE ENCOUNTER
Please review. Protocol failed / No protocol. Requested Prescriptions   Pending Prescriptions Disp Refills    METFORMIN HCL 1000 MG Oral Tab [Pharmacy Med Name: METFORMIN HCL 1,000 MG TABLET] 180 tablet 1     Sig: TAKE 1 TABLET BY MOUTH TWICE A DAY        Diabetes Medication Protocol Failed - 10/19/2022  5:01 PM        Failed - Last A1C < 7.5 and within past 6 months     Lab Results   Component Value Date    A1C 6.8 (H) 12/23/2020               Failed - EGFRCR or GFRNAA > 50     GFR Evaluation              Failed - GFR in the past 12 months        Passed - In person appointment or virtual visit in the past 6 mos or appointment in next 3 mos       Recent Outpatient Visits              1 month ago Inflamed seborrheic keratosis    [facility] Dermatology Iron Dempsey MD    Office Visit    2 months ago Annual physical exam    [facility], [address] Shani Dale MD    Office Visit    10 months ago Suspected 2019 novel coronavirus infection    [address] [facility] ([facility])    Nurse Only    1 year ago [facility] Mitzi Morrissey MD    Office Visit    1 year ago Lichen planopilaris    [facility] Mitzi Morrissey MD    Office Visit     Future Appointments         Provider Department Appt Notes    In 1 week [address]     In 1 month HND [facility] RM1; HND DEXA R [address] if I have any signs of osteoporosis                  BENAZEPRIL-HYDROCHLOROTHIAZIDE 10-12.5 MG Oral Tab [Pharmacy Med Name: BENAZEPRIL-HCTZ 10-12.5 MG TAB] 90 tablet 1     Sig: TAKE 1 TABLET BY MOUTH EVERY DAY        Hypertensive Medications Protocol Failed - 10/19/2022  5:01 PM        Failed - CMP or BMP in past 6 months     No results found for this or any previous visit (from the past 4392 hour(s)).               Failed - EGFRCR or GFRNAA > 50     GFR Evaluation              Passed - In person appointment in the past 12 or next 3 months       Recent Outpatient Visits              1 month ago Inflamed seborrheic keratosis    [facility] Nhi Hunter MD    Office Visit    2 months ago Annual physical exam    [facility] [address] Henny Vergara MD    Office Visit    10 months ago Suspected 2019 novel coronavirus infection    [address] [facility] (Indoor Clinic)    Nurse Only    1 year ago Lichen planopilaris    [facility] Derm[facility]ology Nancie Ramos MD    Office Visit    1 year ago Lichen planopilaris    [facility]erm[facility] Nancie Ramos MD    Office Visit     Future Appointments         Provider Department Appt Notes    In 1 week [address]     In 1 month HND [facility] RM1; HND DEXA R [address] if I have any signs of osteoporosis               Passed - Last BP reading less than 140/90     BP Readings from Last 1 Encounters:  08/16/22 : 135/80                Passed - In person appointment or virtual visit in the past 6 months       Recent Outpatient Visits              1 month ago Inflamed seborrheic keratosis    [facility] Derm[facility] Nhi Hunter MD    Office Visit    2 months ago Annual physical exam    [facility], [address] Henny Vergara MD    Office Visit    10 months ago Suspected 2019 novel coronavirus infection    [address] [facility] (Ind[facility] Clinic)    Nurse Only    1 year ago [address] [facility] Dermatology Nancie Ramos MD    Office Visit    1 year ago Lichen planopilaris    [facility]erm[facility] Nancie Ramos MD    Office Visit     Future Appointments         Provider Department Appt Notes    In 1 week [facility]     In 1 month [facility] RM1; [address] [facility] if I have any signs of osteoporosis                     Future Appointments         Provider Department Appt Notes    In 1 week [address]     In 1 month [facility] RM1; [address] [facility] if I have any signs of osteoporosis            Recent Outpatient Visits              1 month ago Inflamed seborrheic keratosis    [facility] Madhavi Estes MD    Office Visit    2 months ago Annual physical exam    [address] [facility] [address], [address] Trenton Zhang MD    Office Visit    10 months ago Suspected 2019 novel coronavirus infection    [address] (Indoor Clinic)    Nurse Only    1 year ago [address] [facility] Dermat[facility] Shanta Saucedo MD    Office Visit    1 year ago Lichen planopilaris    [facility] Shanta Saucedo MD    Office Visit

## 2022-10-28 ENCOUNTER — IMMUNIZATION (OUTPATIENT)
Dept: LAB | Facility: HOSPITAL | Age: 67
End: 2022-10-28
Attending: PREVENTIVE MEDICINE
Payer: COMMERCIAL

## 2022-10-28 DIAGNOSIS — Z23 NEED FOR VACCINATION: Primary | ICD-10-CM

## 2022-10-28 PROCEDURE — 90471 IMMUNIZATION ADMIN: CPT

## 2022-10-28 PROCEDURE — 0124A SARSCOV2 VAC BVL 30MCG/0.3ML: CPT

## 2022-11-16 ENCOUNTER — TELEPHONE (OUTPATIENT)
Dept: INTERNAL MEDICINE CLINIC | Facility: HOSPITAL | Age: 67
End: 2022-11-16

## 2022-11-16 ENCOUNTER — TELEPHONE (OUTPATIENT)
Dept: INTERNAL MEDICINE CLINIC | Facility: CLINIC | Age: 67
End: 2022-11-16

## 2022-11-16 ENCOUNTER — LAB ENCOUNTER (OUTPATIENT)
Dept: LAB | Age: 67
End: 2022-11-16
Attending: PREVENTIVE MEDICINE
Payer: COMMERCIAL

## 2022-11-16 DIAGNOSIS — Z20.822 SUSPECTED COVID-19 VIRUS INFECTION: ICD-10-CM

## 2022-11-16 DIAGNOSIS — Z20.822 SUSPECTED COVID-19 VIRUS INFECTION: Primary | ICD-10-CM

## 2022-11-16 LAB — SARS-COV-2 RNA RESP QL NAA+PROBE: DETECTED

## 2022-11-16 NOTE — TELEPHONE ENCOUNTER
Patient called stated was seen with  for covid (+) televist    Was advise to call RN triage    Asking for paxlovid    Pharmacy verified

## 2022-11-16 NOTE — TELEPHONE ENCOUNTER
Results and RTW guidelines:    COVID RESULT:    [] Viewed by employee in [address] RTW plan and instructions as indicated on triage call. Manager notified. Estimated RTW date:   [x] Discussed with employee   [] Unable to reach by phone. Sent via [employer] message      Test type:    [x] Rapid         [] Alinity         [] Outside test:   [x] Positive     - Employee should quarantine at home for at least 5 days (day 1 is day after sx onset) , follow the CDC guidelines for cleaning and                              quarantining; see CDC.gov   -This employee may RTW on day 6 if asymptomatic or mildly symptomatic (with improving symptoms). Call Employee Health on day 5 if unable to return on day 6 after                      symptom onset.    -This employee needs to call Employee Health on day 5 after symptom onset. The employee needs to be cleared by Employee Health. - Monitor symptoms and temperature                 - Notify PCP of result                 - Seek emergent care with worsening symptoms   - If employee is still experiencing severe symptoms on day 5 must make a RTW appt with Employee [facility], Employee will not be cleared if:    1. Has consistent cough, shortness of breath or fatigue that restricts your physical activities    2. Is still feeling \"unwell\"    3. Within 15 days of hospitalization for COVID    4. Within 20 days of intubation for COVID    5.  Still has a fever, vomiting or diarrhea   - Keep communication open with management about RTW and if symptoms worsen                - If outside testing completed, bring a copy of result to RTW appointment           Notes:     RTW PLAN:    [x]  If COVID positive results, off work minimum of 5 days from positive test or onset of symptoms (day 0)        On day 5, if asymptomatic or mildly symptomatic (with improving symptoms) may return to work day 6          On day 5, if symptomatic, call Employee Health for RTW screening        []  COVID positive result - call Employee Health on day 5 after symptom onset. The employee needs to be cleared by Employee Health to RTW. [] RTW immediately, continue to monitor for sx  [] RTW when sx improve; must be fever free for 24 hours w/o medications, Diarrhea/Vomiting free for 24 hours w/o medications  [] Alinity ordered; continue to monitor sx and call for new/worsening sx.   Discuss RTW guidelines with manager  [] May continue to work  [x] Follow up with PCP  [] Home until further instruction from hotline with Alinity results  INSTRUCTIONS PROVIDED:  [x]  Plan as noted above  []  Length of time to obtain results   [x]  Quarantine instructions  [x]  Masking protocol   [x]  S/S of worsening infection/condition and importance of prompt medical re-evaluation including when to seek emergency care  [] If symptoms develop, stay home and call hotline for rapid test order    Estimated RTW date:  11/21/2022    [x] The employee voiced understanding of above plan/instructions  [x] Manager Notified

## 2022-11-16 NOTE — TELEPHONE ENCOUNTER
I did not see her I had telvisit  with her  , he said that she had covid too   Pls talk to her , needs to monitor symptoms, drink fluids , vitamin c , I will send paxclovid but she needs to hold cholesterol medication while on paxclovid

## 2023-01-27 RX ORDER — PANTOPRAZOLE SODIUM 40 MG/1
40 TABLET, DELAYED RELEASE ORAL
Qty: 90 TABLET | Refills: 1 | Status: SHIPPED | OUTPATIENT
Start: 2023-01-27

## 2023-01-27 RX ORDER — LANCETS
EACH MISCELLANEOUS
Qty: 100 EACH | Refills: 3 | Status: SHIPPED | OUTPATIENT
Start: 2023-01-27

## 2023-01-27 RX ORDER — PERPHENAZINE 16 MG/1
TABLET, FILM COATED ORAL
Qty: 100 STRIP | Refills: 3 | Status: SHIPPED | OUTPATIENT
Start: 2023-01-27

## 2023-01-27 NOTE — TELEPHONE ENCOUNTER
Refill passed per [employer], [facility] protocol. Requested Prescriptions   Pending Prescriptions Disp Refills    pantoprazole 40 MG Oral Tab EC 90 tablet 1     Sig: Take 1 tablet (40 mg total) by mouth before breakfast.       Gastrointestional Medication Protocol Passed - 1/26/2023 10:51 AM        Passed - In person appointment or virtual visit in the past 12 mos or appointment in next 3 mos     Recent Outpatient Visits              4 months ago Inflamed seborrheic keratosis    Grace Junior MD    Office Visit    5 months ago Annual physical exam    Chaparro Johnston MD    Office Visit    1 year ago Suspected 2019 novel coronavirus infection    [address] (Indoor Clinic)    Nurse Only    1 year ago Lichen planopilaris    Mekhi Junior MD    Office Visit    1 year ago Lichen planopilarMekhi Cohen MD    Office Visit          Future Appointments         Provider Department Appt Notes    In 1 month HND SELAM RM1; HND DEXA R Família Duran 50 if I have any signs of osteoporosis                 atorvastatin 20 MG Oral Tab 90 tablet 1     Sig: Take 1 tablet (20 mg total) by mouth nightly.        Cholesterol Medication Protocol Failed - 1/26/2023 10:51 AM        Failed - ALT in past 12 months        Failed - LDL in past 12 months        Failed - Last ALT < 80     Lab Results   Component Value Date    ALT 20 12/23/2020             Failed - Last LDL < 130     Lab Results   Component Value Date    LDL 49 12/23/2020             Passed - In person appointment or virtual visit in the past 12 mos or appointment in next 3 mos     Recent Outpatient Visits              4 months ago Inflamed seborrheic keratosis    [facility] Vanessa Stallworth MD    Office Visit    5 months ago Annual physical exam    [address], Cristian Lopez MD    Office Visit    1 year ago Suspected 2019 novel coronavirus infection    [facility] (Indoor Clinic)    Nurse Only    1 year ago Lichen planopilarPedro Baugh MD    Office Visit    1 year ago Lichen planopilarPedro Baugh MD    Office Visit          Future Appointments         Provider Department Appt Notes    In 1 month HND SELAM RM1; HND DEXA R Chuck Garzon 50 if I have any signs of osteoporosis                 amoxicillin 500 MG Oral Cap  0       There is no refill protocol information for this order       Glucose Blood (CONTOUR NEXT TEST) In Vitro Strip 100 strip 3     Sig: Use to test blood glucose once daily       Diabetic Supplies Protocol Passed - 1/26/2023 10:51 AM        Passed - In person appointment or virtual visit in the past 12 mos or appointment in next 3 mos     Recent Outpatient Visits              4 months ago Inflamed seborrheic keratosis    Girma Covarrubias MD    Office Visit    5 months ago Annual physical exam    [address], Cristian Lopez MD    Office Visit    1 year ago Suspected 2019 novel coronavirus infection    [facility] (Indoor Clinic)    Nurse Only    1 year ago Lichen planopilarPedro Baugh MD    Office Visit    1 year ago Lichen planopilarPedro Baugh MD    Office Visit          Future Appointments         Provider Department Appt Notes    In 1 month HND SELAM RM1; HND DEXA RM1 [facility] if I have any signs of osteoporosis                 Microlet Lancets Does not apply Misc 100 each 3     Sig: Use to test blood glucose once daily       Diabetic Supplies Protocol Passed - 1/26/2023 10:51 AM        Passed - In person appointment or virtual visit in the past 12 mos or appointment in next 3 mos     Recent Outpatient Visits              4 months ago Inflamed seborrheic keratosis    [address], Jc Mitchell MD    Office Visit    5 months ago Annual physical exam    [address], Diamond Parisi MD    Office Visit    1 year ago Suspected 2019 novel coronavirus infection    [facility] (Indoor Clinic)    Nurse Only    1 year ago Lichen planopilaris    Kelli Sadler MD    Office Visit    1 year ago Lichen Kelli Hernandez MD    Office Visit          Future Appointments         Provider Department Appt Notes    In 1 month HND SELAM RM1; HND DEXA R Família Duran 50 if I have any signs of osteoporosis                 metFORMIN HCl 1000 MG Oral Tab 180 tablet 1     Sig: Take 1 tablet (1,000 mg total) by mouth 2 (two) times daily.        Diabetes Medication Protocol Failed - 1/26/2023 10:51 AM        Failed - Last A1C < 7.5 and within past 6 months     Lab Results   Component Value Date    A1C 6.8 (H) 12/23/2020             Failed - EGFRCR or GFRNAA > 50     GFR Evaluation            Failed - GFR in the past 12 months        Passed - In person appointment or virtual visit in the past 6 mos or appointment in next 3 mos     Recent Outpatient Visits              4 months ago Inflamed seborrheic keratosis    Jc Sadler MD    Office Visit    5 months ago Annual physical exam    Milagros Garcia MD    Office Visit    1 year ago Suspected 2019 novel coronavirus infection    [address] 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 Hypertensive Medications Protocol Failed - 1/26/2023 10:51 AM        Failed - CMP or BMP in past 6 months     No results found for this or any previous visit (from the past 4392 hour(s)).             Failed - EGFRCR or GFRNAA > 50     GFR Evaluation            Passed - In person appointment in the past 12 or next 3 months     Recent Outpatient Visits              4 months ago Inflamed seborrheic keratosis    Niecy Fowler, Melanie Castorena MD    Office Visit    5 months ago Annual physical exam    [address], Suzette Mckeon MD    Office Visit    1 year ago Suspected 2019 novel coronavirus infection    [address] [facility])    Nurse Only    1 year ago Lichen planopilaris    Ana Degroot MD    Office Visit    1 year ago Lichen planopilaris    Ana Degroot MD    Office Visit          Future Appointments         Provider Department Appt Notes    In 1 month HNEDUARDO DOSS RM1; HND DEXA Jeanmarie Wilder 19 DEXA - [place] if I have any signs of osteoporosis               Passed - Last BP reading less than 140/90     BP Readings from Last 1 Encounters:  08/16/22 : 135/80              Passed - In person appointment or virtual visit in the past 6 months     Recent Outpatient Visits              4 months ago Inflamed seborrheic keratosis    Tracey Malcolm MD    Office Visit    5 months ago Annual physical exam    Angelita Castorena MD    Office Visit    1 year ago Suspected 2019 novel coronavirus [facility]    Nurse Only    1 year ago Lichen planopilaris    Zelalemffery Kenia Rojas MD    Office Visit    1 year ago Lichen planopilaris    Prachi Thompson MD    Office Visit          Future Appointments         Provider Department Appt Notes    In 1 month [facility] RM1; HND DEXA VIDAL Garzon 50 if I have any signs of osteoporosis                     Recent Outpatient Visits              4 months ago Inflamed seborrheic keratosis    Chhaya Malcolm MD    Office Visit    5 months ago Annual physical exam    Mayank Thompson MD    Office Visit    1 year ago Suspected 2019 novel coronavirus infection    [facility]    Nurse Only    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

## 2023-01-27 NOTE — TELEPHONE ENCOUNTER
Please review. Protocol failed or has no protocol. [email or web address] sent to patient asking reason for amoxicillin refill. Requested Prescriptions   Pending Prescriptions Disp Refills    atorvastatin 20 MG Oral Tab 90 tablet 1     Sig: Take 1 tablet (20 mg total) by mouth nightly. Cholesterol Medication Protocol Failed - 1/26/2023 10:51 AM        Failed - ALT in past 12 months        Failed - LDL in past 12 months        Failed - Last ALT < 80     Lab Results   Component Value Date    ALT 20 12/23/2020             Failed - Last LDL < 130     Lab Results   Component Value Date    LDL 49 12/23/2020             Passed - In person appointment or virtual visit in the past 12 mos or appointment in next 3 mos     Recent Outpatient Visits              4 months ago Inflamed seborrheic keratosis    [address] [facility], Toyin Krishnamurthy MD    Office Visit    5 months ago Annual physical exam    Sushma Modi MD    Office Visit    1 year ago Suspected 2019 novel coronavirus infection    [address] [facility] (Indoor Clinic)    Nurse Only    1 year ago [facility]hen [facility]ilaris    [facility]    Office [facility]    1 year ago [facility]hen [facility]ilaris    [facility]    [facility]          Future Appointments         Provider Department Appt Notes    In 1 month HND SELAM RM1; HND DEXA R Família Duran 50 if I have any signs of osteoporosis                 amoxicillin 500 MG Oral Cap  0       There is no refill protocol information for this order       metFORMIN HCl 1000 MG Oral Tab 180 tablet 1     Sig: Take 1 tablet (1,000 mg total) by mouth 2 (two) times daily.        Diabetes Medication Protocol Failed - 1/26/2023 10:51 AM        Failed - Last A1C < 7.5 and within past 6 months     Lab Results   Component Value Date    A1C 6.8 (H) 12/23/2020             Failed - EGFRCR or GFRNAA > 50     GFR Evaluation            Failed - GFR in the past 12 months        Passed - In person appointment or virtual visit in the past 6 mos or appointment in next 3 mos     Recent Outpatient Visits              4 months ago Inflamed seborrheic keratosis    Grace Junior MD    Office Visit    5 months ago Annual physical exam    [address], Chaparro Tim MD    Office Visit    1 year ago Suspected 2019 novel coronavirus infection    [address] [facility] (Indoor Clinic)    Nurse Only    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 Hypertensive Medications Protocol Failed - 1/26/2023 10:51 AM        Failed - CMP or BMP in past 6 months     No results found for this or any previous visit (from the past 4392 hour(s)).             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    [address] [facility] Vivian Hagan MD    Office Visit    1 year ago Lichen planopilaris    [address] Vivian Arcos MD    Office Visit          Future Appointments         Provider Department Appt Notes    In 1 month HND SELAM RM1; HND DEXA R Família Duran 51 if I have any signs of osteoporosis               Passed - Last BP reading less than 140/90     BP Readings from Last 1 Encounters:  08/16/22 : 135/80              Passed - In person appointment or virtual visit in the past 6 months     Recent Outpatient Visits              4 months ago Inflamed seborrheic keratosis    [address] [facility] Traci White MD    Office Visit    5 months ago Annual physical exam    Tram Abreu, Shon Fields MD    Office Visit    1 year ago Suspected 2019 novel coronavirus infection    [address] [facility] (Indoor Clinic)    Nurse Only    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       Gastrointestional Medication Protocol Passed - 1/26/2023 10:51 AM        Passed - In person appointment or virtual visit in the past 12 mos or appointment in next 3 mos     Recent Outpatient Visits              4 months ago Inflamed seborrheic keratosis    Tracey Lr MD    Office Visit    5 months ago Annual physical exam    Brittney Persaud MD    Office Visit    1 year ago Suspected 2019 novel coronavirus infection    [facility] [address] (Indoor Clinic)    Nurse Only    1 year ago Lichen planopilaris    Sailaja Lr MD    Office Visit    1 year ago Lichen planopilaris    Sailaja Lr MD    Office Visit          Future Appointments         Provider Department Appt Notes    In 1 month HND SELAM RM1; HND DEXA R Família Duran 51 if I have any signs of osteoporosis                 Glucose Blood (CONTOUR NEXT TEST) In Vitro Strip 100 strip 3     Sig: Use to test blood glucose once daily       Diabetic Supplies Protocol Passed - 1/26/2023 10:51 AM        Passed - In person appointment or virtual visit in the past 12 mos or appointment in next 3 mos     Recent Outpatient Visits              4 months ago Inflamed seborrheic keratosis    Armida Lr MD    Office Visit    5 months ago Annual physical exam    Brittney Persaud MD    Office Visit    1 year ago Suspected 2019 novel coronavirus infection    [address] (Indoor Clinic)    Nurse Only    1 year ago Lichen planopilaris    Sailaja Lr MD    Office Visit    1 year ago Lichen planopilaris    Sailaja Lr MD    Office Visit          Future Appointments         Provider Department Appt Notes    In 1 month HND [facility] RM1; HND DEXA R Família Duran 51 if I have any signs of osteoporosis                 Microlet Lancets Does not apply Misc 100 each 3     Sig: Use to test blood glucose once daily       Diabetic Supplies Protocol Passed - 1/26/2023 10:51 AM        Passed - In person appointment or virtual visit in the past 12 mos or appointment in next 3 mos     Recent Outpatient Visits              4 months ago Inflamed seborrheic keratosis    [employer], [address] [facility] Yuli Lang MD    Office Visit    5 months ago Annual physical exam    Soto Bella MD    Office Visit    1 year ago Suspected 2019 novel coronavirus infection    [address] (Indoor Clinic)    Nurse Only    1 year ago Lichen [facility]opilaris    [facility]    Office Visit    1 year ago Lichen planopilaris    Loccie, Fanta Vallejo MD    Office Visit          Future Appointments         Provider Department Appt Notes    In 1 month HND SELAM RM1; HND DEXA R Família Duran 51 if I have any signs of osteoporosis                    Recent Outpatient Visits              4 months ago Inflamed seborrheic keratosis    [address] [facility] Edita White MD    Office Visit    5 months ago Annual physical exam    [employer], Zane Mart MD    Office Visit    1 year ago Suspected 2019 novel coronavirus infection    [address] (Indoor Clinic)    Nurse Only    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

## 2023-01-28 NOTE — TELEPHONE ENCOUNTER
Please review. Protocol failed / No Protocol. Requested Prescriptions   Pending Prescriptions Disp Refills    atorvastatin 20 MG Oral Tab 90 tablet 1     Sig: Take 1 tablet (20 mg total) by mouth nightly. Cholesterol Medication Protocol Failed - 1/26/2023 10:51 AM        Failed - ALT in past 12 months        Failed - LDL in past 12 months        Failed - Last ALT < 80     Lab Results   Component Value Date    ALT 20 12/23/2020             Failed - Last LDL < 130     Lab Results   Component Value Date    LDL 49 12/23/2020             Passed - In person appointment or virtual visit in the past 12 mos or appointment in next 3 mos     Recent Outpatient Visits              4 months ago Inflamed seborrheic keratosis    [address] [facility], Tamar Mccarthy MD    Office Visit    5 months ago Annual physical exam    [address], Gagandeep Ratliff MD    Office Visit    1 year ago Suspected 2019 novel coronavirus infection    [facility] (Indoor Clinic)    Nurse Only    1 year ago Lichen planopilaris    [facility]    Office Visit    1 year ago Lichen planopilaris    [facility]    [facility] Visit          Future Appointments         Provider Department Appt Notes    In 1 month HND SELAM RM1; HND DEXA R Família Duran 51 if I have any signs of osteoporosis                 amoxicillin 500 MG Oral Cap  0       There is no refill protocol information for this order       metFORMIN HCl 1000 MG Oral Tab 180 tablet 1     Sig: Take 1 tablet (1,000 mg total) by mouth 2 (two) times daily.        Diabetes Medication Protocol Failed - 1/26/2023 10:51 AM        Failed - Last A1C < 7.5 and within past 6 months     Lab Results   Component Value Date    A1C 6.8 (H) 12/23/2020 Failed - EGFRCR or GFRNAA > 50     GFR Evaluation            Failed - GFR in the past 12 months        Passed - In person appointment or virtual visit in the past 6 mos or appointment in next 3 mos     Recent Outpatient Visits              4 months ago Inflamed seborrheic keratosis    Lucian Neville MD    Office Visit    5 months ago Annual physical exam    [address], Arnaud Servin MD    Office Visit    1 year ago Suspected 2019 novel coronavirus infection    [address] [facility] (Indoor Clinic)    Nurse Only    1 year ago Lichen planopilaris    Jay Neville MD    Office Visit    1 year ago Lichen planopilaris    Jay Neville MD    Office Visit          Future Appointments         Provider Department Appt Notes    In 1 month HND SELAM RM1; HND DEXA R Chuck Garzon 50 if I have any signs of osteoporosis                 Benazepril-hydroCHLOROthiazide 10-12.5 MG Oral Tab 90 tablet 1     Sig: Take 1 tablet by mouth daily. Hypertensive Medications Protocol Failed - 1/26/2023 10:51 AM        Failed - CMP or BMP in past 6 months     No results found for this or any previous visit (from the past 4392 hour(s)).             Failed - EGFRCR or GFRNAA > 50     GFR Evaluation            Passed - In person appointment in the past 12 or next 3 months     Recent Outpatient Visits              4 months ago Inflamed seborrheic keratosis    Lucian Neville MD    Office Visit    5 months ago Annual physical exam    [address], Arnaud Servin MD    Office Visit    1 year ago Suspected 2019 novel coronavirus infection    [address] [facility] (Indoor Clinic)    Nurse Only    1 year ago Lichen planopilaris    Berenice Mccabe MD    Office Visit    1 year ago Lichen planopilaris    Jennifer Tejada MD    Office Visit          Future Appointments         Provider Department Appt Notes    In 1 month D [facility] RM1; D DEXA VIDAL Garzon 51 if I have any signs of osteoporosis               Passed - Last BP reading less than 140/90     BP Readings from Last 1 Encounters:  08/16/22 : 135/80              Passed - In person appointment or virtual visit in the past 6 months     Recent Outpatient Visits              4 months ago Inflamed seborrheic keratosis    Toyin Tejada MD    Office Visit    5 months ago Annual physical exam    Sushma Modi MD    Office Visit    1 year ago Suspected 2019 novel coronavirus infection    [address] (Indoor Clinic)    Nurse Only    1 year ago Lichen planopilaris    Jennifer Tejada MD    Office Visit    1 year ago Lichen planopilaris    Jennifer Tejada MD    Office Visit          Future Appointments         Provider Department Appt Notes    In 1 month D [facility] RM1; D DEXA VIDAL Garzon 51 if I have any signs of osteoporosis                Signed Prescriptions Disp Refills    pantoprazole 40 MG Oral Tab EC 90 tablet 1     Sig: Take 1 tablet (40 mg total) by mouth before breakfast.       Gastrointestional Medication Protocol Passed - 1/26/2023 10:51 AM        Passed - In person appointment or virtual visit in the past 12 mos or appointment in next 3 mos     Recent Outpatient Visits              4 months ago Infl[facility] seborrheic keratosis    [facility]    Office Visit    5 months ago Annual physical exam    [address], Osmany Mckenzie MD    Office Visit    1 year ago Suspected 2019 novel coronavirus infection    [facility] (Indoor Clinic)    Nurse Only    1 year ago Lichen planopilaris    [address] [facility] Cher White MD    Office Visit    1 year ago Lichen planopilaris    [facility]    [facility]          Future Appointments         Provider Department Appt Notes    In 1 month HND SELAM RM1; HND DEXA R Família Duran 51 if I have any signs of osteoporosis                 Glucose Blood (CONTOUR NEXT TEST) In Vitro Strip 100 strip 3     Sig: Use to test blood glucose once daily       Diabetic Supplies Protocol Passed - 1/26/2023 10:51 AM        Passed - In person appointment or virtual visit in the past 12 mos or appointment in next 3 mos     Recent Outpatient Visits              4 months ago Inflamed seborrheic keratosis    [facility]    Office Visit    5 months ago Annual physical exam    [address], Osmany Mckenzie MD    Office Visit    1 year ago Suspected 2019 novel coronavirus infection    [address] [place] [address] (Indoor Clinic)    Nurse Only    1 year ago Lichen planopilaris    [facility]    Office Visit    1 year ago Lichen planopilaris    [address] [facility] Cher White MD    Office Visit          Future Appointments         Provider Department Appt Notes    In 1 month HND SELAM RM1; HND DEXA R Família Duran 51 if I have any signs of osteoporosis                 Microlet Lancets Does not apply Misc 100 each 3     Sig: Use to test blood glucose once daily       Diabetic Supplies Protocol Passed - 1/26/2023 10:51 AM        Passed - In person appointment or virtual visit in the past 12 mos or appointment in next 3 mos     Recent Outpatient Visits              4 months ago Inflamed seborrheic keratosis    [address], [address] [facility] Yuli Lang MD    Office Visit    5 months ago Annual physical exam    [address], Zane Mart MD    Office Visit    1 year ago Suspected 2019 novel coronavirus infection    [address] [facility] (Indoor Clinic)    Nurse Only    1 year ago Lichen planopilaris    Rhina Delaney MD    Office Visit    1 year ago Lichen planopilaris    Rhina Delaney MD    Office Visit          Future Appointments         Provider Department Appt Notes    In 1 month HND SELAM RM1; HND DEXA R Chuck Garzon 51 if I have any signs of osteoporosis

## 2023-01-29 RX ORDER — AMOXICILLIN 500 MG/1
CAPSULE ORAL
Refills: 0 | OUTPATIENT
Start: 2023-01-29

## 2023-01-29 RX ORDER — ATORVASTATIN CALCIUM 20 MG/1
20 TABLET, FILM COATED ORAL NIGHTLY
Qty: 90 TABLET | Refills: 1 | Status: SHIPPED | OUTPATIENT
Start: 2023-01-29

## 2023-02-09 ENCOUNTER — TELEPHONE (OUTPATIENT)
Dept: INTERNAL MEDICINE CLINIC | Facility: CLINIC | Age: 68
End: 2023-02-09

## 2023-02-09 NOTE — TELEPHONE ENCOUNTER
Please call patient she is overdue for blood work. We need to check her A1c cholesterol the orders are in since August and she has not done them yet. She also needs eye exam she is overdue since 2021 I will put referral for Dr. Danish Hemphill she needs to call and schedule.

## 2023-02-21 ENCOUNTER — LAB ENCOUNTER (OUTPATIENT)
Dept: LAB | Facility: HOSPITAL | Age: 68
End: 2023-02-21
Attending: INTERNAL MEDICINE
Payer: MEDICARE

## 2023-02-21 DIAGNOSIS — Z00.00 ANNUAL PHYSICAL EXAM: ICD-10-CM

## 2023-02-21 DIAGNOSIS — E11.00 TYPE 2 DIABETES MELLITUS WITH HYPEROSMOLARITY WITHOUT COMA, WITHOUT LONG-TERM CURRENT USE OF INSULIN (HCC): ICD-10-CM

## 2023-02-21 LAB
ALBUMIN SERPL-MCNC: 3.5 G/DL (ref 3.4–5)
ALBUMIN/GLOB SERPL: 0.9 {RATIO} (ref 1–2)
ALP LIVER SERPL-CCNC: 84 U/L
ALT SERPL-CCNC: 23 U/L
ANION GAP SERPL CALC-SCNC: 6 MMOL/L (ref 0–18)
AST SERPL-CCNC: 16 U/L (ref 15–37)
BASOPHILS # BLD AUTO: 0.05 X10(3) UL (ref 0–0.2)
BASOPHILS NFR BLD AUTO: 0.7 %
BILIRUB SERPL-MCNC: 0.5 MG/DL (ref 0.1–2)
BUN BLD-MCNC: 14 MG/DL (ref 7–18)
BUN/CREAT SERPL: 17.3 (ref 10–20)
CALCIUM BLD-MCNC: 9.5 MG/DL (ref 8.5–10.1)
CHLORIDE SERPL-SCNC: 109 MMOL/L (ref 98–112)
CHOLEST SERPL-MCNC: 128 MG/DL (ref ?–200)
CO2 SERPL-SCNC: 30 MMOL/L (ref 21–32)
CREAT BLD-MCNC: 0.81 MG/DL
CREAT UR-SCNC: 215 MG/DL
DEPRECATED RDW RBC AUTO: 44.5 FL (ref 35.1–46.3)
EOSINOPHIL # BLD AUTO: 0.23 X10(3) UL (ref 0–0.7)
EOSINOPHIL NFR BLD AUTO: 3.2 %
ERYTHROCYTE [DISTWIDTH] IN BLOOD BY AUTOMATED COUNT: 14.9 % (ref 11–15)
EST. AVERAGE GLUCOSE BLD GHB EST-MCNC: 160 MG/DL (ref 68–126)
FASTING PATIENT LIPID ANSWER: YES
FASTING STATUS PATIENT QL REPORTED: YES
GFR SERPLBLD BASED ON 1.73 SQ M-ARVRAT: 79 ML/MIN/1.73M2 (ref 60–?)
GLOBULIN PLAS-MCNC: 3.7 G/DL (ref 2.8–4.4)
GLUCOSE BLD-MCNC: 142 MG/DL (ref 70–99)
HBA1C MFR BLD: 7.2 % (ref ?–5.7)
HCT VFR BLD AUTO: 42 %
HDLC SERPL-MCNC: 41 MG/DL (ref 40–59)
HGB BLD-MCNC: 13.1 G/DL
IMM GRANULOCYTES # BLD AUTO: 0.02 X10(3) UL (ref 0–1)
IMM GRANULOCYTES NFR BLD: 0.3 %
LDLC SERPL CALC-MCNC: 55 MG/DL (ref ?–100)
LYMPHOCYTES # BLD AUTO: 1.57 X10(3) UL (ref 1–4)
LYMPHOCYTES NFR BLD AUTO: 21.8 %
MCH RBC QN AUTO: 25.5 PG (ref 26–34)
MCHC RBC AUTO-ENTMCNC: 31.2 G/DL (ref 31–37)
MCV RBC AUTO: 81.7 FL
MICROALBUMIN UR-MCNC: 1.38 MG/DL
MICROALBUMIN/CREAT 24H UR-RTO: 6.4 UG/MG (ref ?–30)
MONOCYTES # BLD AUTO: 0.56 X10(3) UL (ref 0.1–1)
MONOCYTES NFR BLD AUTO: 7.8 %
NEUTROPHILS # BLD AUTO: 4.78 X10 (3) UL (ref 1.5–7.7)
NEUTROPHILS # BLD AUTO: 4.78 X10(3) UL (ref 1.5–7.7)
NEUTROPHILS NFR BLD AUTO: 66.2 %
NONHDLC SERPL-MCNC: 87 MG/DL (ref ?–130)
OSMOLALITY SERPL CALC.SUM OF ELEC: 303 MOSM/KG (ref 275–295)
PLATELET # BLD AUTO: 297 10(3)UL (ref 150–450)
POTASSIUM SERPL-SCNC: 3.9 MMOL/L (ref 3.5–5.1)
PROT SERPL-MCNC: 7.2 G/DL (ref 6.4–8.2)
RBC # BLD AUTO: 5.14 X10(6)UL
SODIUM SERPL-SCNC: 145 MMOL/L (ref 136–145)
TRIGL SERPL-MCNC: 196 MG/DL (ref 30–149)
TSI SER-ACNC: 2.33 MIU/ML (ref 0.36–3.74)
VLDLC SERPL CALC-MCNC: 28 MG/DL (ref 0–30)
WBC # BLD AUTO: 7.2 X10(3) UL (ref 4–11)

## 2023-02-21 PROCEDURE — 82043 UR ALBUMIN QUANTITATIVE: CPT

## 2023-02-21 PROCEDURE — 85025 COMPLETE CBC W/AUTO DIFF WBC: CPT

## 2023-02-21 PROCEDURE — 82570 ASSAY OF URINE CREATININE: CPT

## 2023-02-21 PROCEDURE — 80053 COMPREHEN METABOLIC PANEL: CPT

## 2023-02-21 PROCEDURE — 36415 COLL VENOUS BLD VENIPUNCTURE: CPT

## 2023-02-21 PROCEDURE — 83036 HEMOGLOBIN GLYCOSYLATED A1C: CPT

## 2023-02-21 PROCEDURE — 84443 ASSAY THYROID STIM HORMONE: CPT

## 2023-02-21 PROCEDURE — 80061 LIPID PANEL: CPT

## 2023-03-21 ENCOUNTER — HOSPITAL ENCOUNTER (OUTPATIENT)
Dept: BONE DENSITY | Age: 68
Discharge: HOME | End: 2023-03-21
Attending: INTERNAL MEDICINE
Payer: MEDICARE

## 2023-03-21 DIAGNOSIS — Z78.0 MENOPAUSE: ICD-10-CM

## 2023-03-21 PROCEDURE — 77080 DXA BONE DENSITY AXIAL: CPT | Performed by: INTERNAL MEDICINE

## 2023-04-21 ENCOUNTER — TELEPHONE (OUTPATIENT)
Dept: INTERNAL MEDICINE CLINIC | Facility: CLINIC | Age: 68
End: 2023-04-21

## 2023-04-21 NOTE — TELEPHONE ENCOUNTER
Pt informed that Dr. Isaias Sen sent one dose of Diflucan to patients pharmacy and informed her to drink plenty of fluids.

## 2023-05-15 RX ORDER — PERPHENAZINE 16 MG/1
TABLET, FILM COATED ORAL
Qty: 100 STRIP | Refills: 3 | Status: CANCELLED | OUTPATIENT
Start: 2023-05-15

## 2023-05-15 RX ORDER — LANCETS
EACH MISCELLANEOUS
Qty: 100 EACH | Refills: 3 | Status: CANCELLED | OUTPATIENT
Start: 2023-05-15

## 2023-05-16 ENCOUNTER — TELEPHONE (OUTPATIENT)
Dept: INTERNAL MEDICINE CLINIC | Facility: CLINIC | Age: 68
End: 2023-05-16

## 2023-05-16 DIAGNOSIS — E11.00 TYPE 2 DIABETES MELLITUS WITH HYPEROSMOLARITY WITHOUT COMA, WITHOUT LONG-TERM CURRENT USE OF INSULIN (HCC): Primary | ICD-10-CM

## 2023-05-17 RX ORDER — PERPHENAZINE 16 MG/1
TABLET, FILM COATED ORAL
Qty: 100 STRIP | Refills: 3 | Status: SHIPPED | OUTPATIENT
Start: 2023-05-17

## 2023-05-17 NOTE — TELEPHONE ENCOUNTER
Refill passed per [address] with Rosita Bernheim at [facility] who stated that the Rx on file from 1/27/23 is not Medicare B compliant. They need a new script with ICD 10 code, how often it is used and if insulin dependent.      Requested Prescriptions   Pending Prescriptions Disp Refills    Glucose Blood (CONTOUR NEXT TEST) In Vitro Strip 100 strip 3     Sig: Use to test blood glucose once daily       Diabetic Supplies Protocol Passed - 5/16/2023  1:49 PM        Passed - In person appointment or virtual visit in the past 12 mos or appointment in next 3 mos     Recent Outpatient Visits              8 months ago Inflamed seborrheic keratosis    Tracey Junior MD    Office Visit    9 months ago Annual physical exam    [address] [facility],[address], Chaparro Tim MD    Office Visit    1 year ago Suspected 2019 novel coronavirus infection    [facility] (Indoor Clinic)    Nurse Only    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

## 2023-05-19 RX ORDER — LANCETS
EACH MISCELLANEOUS
Qty: 100 EACH | Refills: 3 | Status: SHIPPED | OUTPATIENT
Start: 2023-05-19

## 2023-05-19 NOTE — TELEPHONE ENCOUNTER
Refill pass per protocol    Requested Prescriptions   Pending Prescriptions Disp Refills    Microlet Lancets Does not apply Misc 100 each 3     Sig: Use to test blood glucose once daily       Diabetic Supplies Protocol Passed - 5/19/2023  2:47 PM        Passed - In person appointment or virtual visit in the past 12 mos or appointment in next 3 mos     Recent Outpatient Visits              8 months ago Inflamed seborrheic keratosis    [address], [facility]    Office Visit    9 months ago Annual physical exam    [address], Tiffanie Lei MD    Office Visit    1 year ago Suspected 2019 novel coronavirus infection    [address] (Indoor Clinic)    Nurse Only    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    Office Visit

## 2023-05-19 NOTE — TELEPHONE ENCOUNTER
Reji Lamar from [facility] in [place] is requesting the Diagnosis code for the refill request to note    E11.9 And note that patient is not on insulin    MICRO VNVRSI

## 2023-06-06 ENCOUNTER — TELEPHONE (OUTPATIENT)
Dept: INTERNAL MEDICINE CLINIC | Facility: CLINIC | Age: 68
End: 2023-06-06

## 2023-06-06 ENCOUNTER — OFFICE VISIT (OUTPATIENT)
Dept: INTERNAL MEDICINE CLINIC | Facility: CLINIC | Age: 68
End: 2023-06-06

## 2023-06-06 VITALS
BODY MASS INDEX: 44.65 KG/M2 | SYSTOLIC BLOOD PRESSURE: 130 MMHG | OXYGEN SATURATION: 94 % | HEART RATE: 81 BPM | TEMPERATURE: 98 F | DIASTOLIC BLOOD PRESSURE: 65 MMHG | WEIGHT: 252 LBS | HEIGHT: 63 IN

## 2023-06-06 DIAGNOSIS — R05.1 ACUTE COUGH: Primary | ICD-10-CM

## 2023-06-06 RX ORDER — AZITHROMYCIN 250 MG/1
TABLET, FILM COATED ORAL
Qty: 6 TABLET | Refills: 0 | Status: SHIPPED | OUTPATIENT
Start: 2023-06-06 | End: 2023-06-11

## 2023-06-06 RX ORDER — BENZONATATE 100 MG/1
100 CAPSULE ORAL 3 TIMES DAILY PRN
Qty: 20 CAPSULE | Refills: 0 | Status: SHIPPED | OUTPATIENT
Start: 2023-06-06 | End: 2023-06-13

## 2023-06-06 NOTE — PROGRESS NOTES
Eye exam report requested from [facility] [phone].

## 2023-08-21 NOTE — TELEPHONE ENCOUNTER
Refill passed per [facility] protocol. Requested Prescriptions   Pending Prescriptions Disp Refills    empagliflozin 10 MG Oral Tab 90 tablet 0     Sig: Take 1 tablet (10 mg total) by mouth daily.        Diabetes Medication Protocol Passed - 8/20/2023 10:50 AM        Passed - Last A1C < 7.5 and within past 6 months     Lab Results   Component Value Date    A1C 7.2 (H) 02/21/2023             Passed - In person appointment or virtual visit in the past 6 mos or appointment in next 3 mos     Recent Outpatient Visits              2 months ago Acute cough    [facility] Boas, Lewanda Meissner, MD    Office Visit    11 months ago Inflamed seborrheic keratosis    Hemalatha Velasco MD    Office Visit    1 year ago Annual physical exam    [facility] Boas, Lewanda Meissner, MD    Office Visit    1 year ago Suspected 2019 novel coronavirus infection    [address] [place] [address] (Indoor Clinic)    Nurse Only    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    Melba Velasco MD    Office Visit                      Passed - EGFRCR or GFRNAA > 50     GFR Evaluation  EGFRCR: 79 , resulted on 2/21/2023          Passed - GFR in the past 12 months             Recent Outpatient Visits              2 months ago Acute cough    [facility] Boas, Lewanda Meissner, MD    Office Visit    11 months ago Inflamed seborrheic keratosis    Hemalatha Velasco MD    Office Visit    1 year ago Annual physical exam    [facility] Bo[facility], Lewanda Meissner, MD    Office Visit    1 year ago Suspected 2019 novel coronavirus infection    [address] [place] [address])    Nurse Only    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

## 2023-09-29 NOTE — TELEPHONE ENCOUNTER
Please review. Protocol failed / Has no protocol. Requested Prescriptions   Pending Prescriptions Disp Refills    BENAZEPRIL-HYDROCHLOROTHIAZIDE 10-12.5 MG Oral Tab [Pharmacy Med Name: Benazepril Hydrochloride/Hydrochlorothiazide 10-12. Tab Anip] 90 tablet 0     Sig: TAKE ONE TABLET BY MOUTH ONE TIME DAILY       Hypertensive Medications Protocol Failed - 9/28/2023  9:22 AM        Failed - CMP or BMP in past 6 months     No results found for this or any previous visit (from the past 4392 hour(s)).             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    Darryl Vance MD    Office Visit                      Passed - Last BP reading less than 140/90     BP Readings from Last 1 Encounters:  06/06/23 : 130/65              Passed - In person appointment or virtual visit in the past 6 months     Recent Outpatient Visits              3 months ago Acute cough    [facility] Bo[facility], Lewanda Meissner, MD    Office Visit    1 year ago Inflamed seborrheic keratosis    Shady Francis MD    Office Visit    1 year ago Annual physical exam    [facility] Bo[facility], Lewanda Meissner, MD    Office Visit    1 year ago Suspected 2019 novel coronavirus infection    [facility])    Nurse Only    2 years ago Lichen planopilarBelen Valentine MD    Office Visit                      Passed - [facility] or GFRNAA > 50     GFR Evaluation  EGFRCR: 79 , resulted on 2/21/2023                Recent Outpatient Visits              3 months ago Acute cough    Lyndsay David MD    Office Visit    1 year ago Inflamed seborrheic keratosis    Evelyn Luz MD    Office Visit    1 year ago Annual physical exam    Lyndsay David MD    Office Visit    1 year ago Suspected 2019 novel coronavirus infection    [facility])    Nurse Only    2 years ago Lichen planopilarBelen Valentine MD    Office Visit

## 2023-10-02 NOTE — TELEPHONE ENCOUNTER
Please review. Protocol failed / Has no protocol. Requested Prescriptions   Pending Prescriptions Disp Refills    METFORMIN HCL 1000 MG Oral Tab [Pharmacy Med Name: Metformin Hydrochloride 1,000 Mg Tab Auro] 180 tablet 0     Sig: Take 1 tablet (1,000 mg total) by mouth 2 (two) times daily.        Diabetes Medication Protocol Failed - 9/30/2023 10:12 AM        Failed - Last A1C < 7.5 and within past 6 months     Lab Results   Component Value Date    A1C 7.2 (H) 02/21/2023             Passed - In person appointment or virtual visit in the past 6 mos or appointment in next 3 mos     Recent Outpatient Visits              3 months ago Acute cough    [address], Kieran Mendez MD    Office Visit    1 year ago Inflamed seborrheic keratosis    [address], [address] [facility] MD Abdiel    Office Visit    1 year ago Annual physical exam    [address], Kieran Mendez MD    Office Visit    1 year ago Suspected 2019 novel coronavirus infection    [facility] (Indoor Clinic)    Nurse Only    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    Manuelita Acre, Maryan Guy MD    Office Visit                      Passed - EGFRCR or GFRNAA > 50     GFR Evaluation  EGFRCR: 79 , resulted on 2/21/2023          Passed - GFR in the past 12 months              Recent Outpatient Visits              3 months ago Acute cough    [address], Kieran Mendez MD    Office Visit    1 year ago Inflamed seborrheic keratosis    Tayler Dominguez MD    Office Visit    1 year ago Annual physical exam    [address], Kieran Mendez MD    Office Visit    1 year ago Suspected 2019 novel coronavirus infection    [facility] [address] [facility]    Nurse Only    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

## 2023-11-08 ENCOUNTER — IMMUNIZATION (OUTPATIENT)
Dept: LAB | Age: 68
End: 2023-11-08
Attending: EMERGENCY MEDICINE
Payer: MEDICARE

## 2023-11-08 DIAGNOSIS — Z23 NEED FOR VACCINATION: Primary | ICD-10-CM

## 2023-11-08 PROCEDURE — 90480 ADMN SARSCOV2 VAC 1/ONLY CMP: CPT

## 2023-11-08 PROCEDURE — 90662 IIV NO PRSV INCREASED AG IM: CPT

## 2023-11-08 PROCEDURE — 90471 IMMUNIZATION ADMIN: CPT

## 2023-11-13 NOTE — OPERATIVE REPORT
Addended by: STORMY VELOZ on: 11/13/2023 10:33 AM     Modules accepted: Orders     [facility]    PATIENT'S NAME: Angel Garcia   ATTENDING PHYSICIAN: Rahat Saxena MD   OPERATING PHYSICIAN: Randa Jeffries.  MD Fracisco   PATIENT ACCOUNT#:   [de-identified]    LOCATION:  [address] [place] [address] #:   [id number]       DATE OF BIRTH administered by Dr. Esteban Alcazar. The patient was then transferred to the operating room, positioned supine on the operating table. General endotracheal anesthesia was established.     A Campa urinary catheter was placed by the operating nursing staff using stand was released. The anterior horn of the lateral meniscus was excised. Anterior cruciate ligament was released from the tibia, resected proximally.   With the patella everted, osteophytes from the patella, as well as the medial and proximal aspects of the m cuts.  The guide was removed. Intramedullary retractor was placed. Any remaining meniscus tissue medially and laterally was excised by sharp technique.   A 3/4 inch curved osteotome was used to resect the proximal articular cartilage and/or osteophyte fro towel clips at either pole. Caliper measured patellar thickness was 23.5 mm. A frontal plane resection cut was made to a final caliper measured thickness of 14 mm. Sizing showed optimal patellar coverage of 31 mm.   The centering hole of the sizing guide surface was prepared with pulse saline lavage irrigation followed by application of the cement to the patella and implantation of a Biomet RCOM patellar prosthesis of 31 mm diameter. This was secured with a compression clamp.   Excess cement was removed fr condition. Resected bone and soft tissue specimens from the knee were all referred to Pathology for standard evaluation. Estimated blood loss for surgery was 100 mL.   The wound drain was secured to a SureTrans collection system prior to application of th

## 2023-11-17 ENCOUNTER — OFFICE VISIT (OUTPATIENT)
Dept: INTERNAL MEDICINE CLINIC | Facility: CLINIC | Age: 68
End: 2023-11-17

## 2023-11-17 ENCOUNTER — PATIENT MESSAGE (OUTPATIENT)
Dept: INTERNAL MEDICINE CLINIC | Facility: CLINIC | Age: 68
End: 2023-11-17

## 2023-11-17 ENCOUNTER — LAB ENCOUNTER (OUTPATIENT)
Dept: LAB | Age: 68
End: 2023-11-17
Attending: INTERNAL MEDICINE
Payer: MEDICARE

## 2023-11-17 DIAGNOSIS — E66.01 OBESITY, MORBID (HCC): ICD-10-CM

## 2023-11-17 DIAGNOSIS — E11.00 TYPE 2 DIABETES MELLITUS WITH HYPEROSMOLARITY WITHOUT COMA, WITHOUT LONG-TERM CURRENT USE OF INSULIN (HCC): ICD-10-CM

## 2023-11-17 DIAGNOSIS — Z00.00 ENCOUNTER FOR ANNUAL HEALTH EXAMINATION: Primary | ICD-10-CM

## 2023-11-17 DIAGNOSIS — Z12.31 ENCOUNTER FOR SCREENING MAMMOGRAM FOR MALIGNANT NEOPLASM OF BREAST: ICD-10-CM

## 2023-11-17 DIAGNOSIS — E11.9 TYPE 2 DIABETES MELLITUS WITHOUT COMPLICATION, WITHOUT LONG-TERM CURRENT USE OF INSULIN (HCC): ICD-10-CM

## 2023-11-17 PROBLEM — M17.11 PRIMARY OSTEOARTHRITIS OF RIGHT KNEE: Status: RESOLVED | Noted: 2017-11-13 | Resolved: 2023-11-17

## 2023-11-17 PROBLEM — M17.12 PRIMARY OSTEOARTHRITIS OF LEFT KNEE: Status: RESOLVED | Noted: 2017-07-10 | Resolved: 2023-11-17

## 2023-11-17 PROBLEM — Z96.651 TOTAL KNEE REPLACEMENT STATUS, RIGHT: Status: RESOLVED | Noted: 2017-11-21 | Resolved: 2023-11-17

## 2023-11-17 PROBLEM — R19.7 DIARRHEA OF PRESUMED INFECTIOUS ORIGIN: Status: RESOLVED | Noted: 2017-10-24 | Resolved: 2023-11-17

## 2023-11-17 PROBLEM — L30.9 DERMATITIS: Status: RESOLVED | Noted: 2021-01-25 | Resolved: 2023-11-17

## 2023-11-17 LAB
EST. AVERAGE GLUCOSE BLD GHB EST-MCNC: 154 MG/DL (ref 68–126)
HBA1C MFR BLD: 7 % (ref ?–5.7)

## 2023-11-17 PROCEDURE — 83036 HEMOGLOBIN GLYCOSYLATED A1C: CPT

## 2023-11-17 PROCEDURE — 36415 COLL VENOUS BLD VENIPUNCTURE: CPT

## 2023-11-18 VITALS
HEART RATE: 72 BPM | BODY MASS INDEX: 43.94 KG/M2 | DIASTOLIC BLOOD PRESSURE: 75 MMHG | HEIGHT: 63 IN | WEIGHT: 248 LBS | TEMPERATURE: 97 F | SYSTOLIC BLOOD PRESSURE: 135 MMHG | OXYGEN SATURATION: 98 %

## 2023-11-18 NOTE — TELEPHONE ENCOUNTER
Office staff=please assists . From: Kingmukesh Antonio  To: Hermelinda Mcdonough  Sent: 11/17/2023  4:18 PM CST  Subject: Weight at 11/17/2023 visit    Hi Dr. Jenny Jeffries read over my After Visit Summary from my visit today and the weight recorded was 268 which is incorrect. My weight on the office scale was actually 248.  (I had weighed myself at home which was  245.)                     Please amend my medical record     Thank you, Bernabe Ceja

## 2023-11-20 ENCOUNTER — MED REC SCAN ONLY (OUTPATIENT)
Dept: INTERNAL MEDICINE CLINIC | Facility: CLINIC | Age: 68
End: 2023-11-20

## 2023-12-18 ENCOUNTER — HOSPITAL ENCOUNTER (OUTPATIENT)
Dept: MAMMOGRAPHY | Age: 68
Discharge: HOME | End: 2023-12-18
Attending: INTERNAL MEDICINE
Payer: MEDICARE

## 2023-12-18 DIAGNOSIS — Z12.31 ENCOUNTER FOR SCREENING MAMMOGRAM FOR MALIGNANT NEOPLASM OF BREAST: ICD-10-CM

## 2023-12-18 PROCEDURE — 77067 SCR MAMMO BI INCL CAD: CPT | Performed by: INTERNAL MEDICINE

## 2023-12-18 PROCEDURE — 77063 BREAST TOMOSYNTHESIS BI: CPT | Performed by: INTERNAL MEDICINE

## 2024-03-06 DIAGNOSIS — E11.00 TYPE 2 DIABETES MELLITUS WITH HYPEROSMOLARITY WITHOUT COMA, WITHOUT LONG-TERM CURRENT USE OF INSULIN (HCC): ICD-10-CM

## 2024-03-06 RX ORDER — PERPHENAZINE 16 MG/1
TABLET, FILM COATED ORAL
Qty: 100 STRIP | Refills: 3 | Status: SHIPPED | OUTPATIENT
Start: 2024-03-06

## 2024-03-06 RX ORDER — LANCETS
EACH MISCELLANEOUS
Qty: 100 EACH | Refills: 3 | Status: SHIPPED | OUTPATIENT
Start: 2024-03-06

## 2024-03-06 RX ORDER — AMOXICILLIN 500 MG/1
500 CAPSULE ORAL ONCE
Qty: 4 CAPSULE | Refills: 0 | Status: SHIPPED | OUTPATIENT
Start: 2024-03-06 | End: 2024-03-06

## 2024-03-06 NOTE — TELEPHONE ENCOUNTER
Refill passed per [facility] protocol.     Requested Prescriptions   Pending Prescriptions Disp Refills    Microlet Lancets Does not apply Misc 100 each 3     Sig: Use to test blood glucose once daily       Diabetic Supplies Protocol Passed - 3/6/2024 10:56 AM        Passed - In person appointment or virtual visit in the past 12 mos or appointment in next 3 mos     Recent Outpatient Visits              3 months ago Encounter for annual health examination    [facility], Geoff Soto Arlinda, MD    Office Visit    9 months ago Acute cough    [facility]    Office Visit    1 year ago Inflamed seborrheic keratosis    [facility]    Office Visit    1 year ago Annual physical exam    [facility]    Office Visit    2 years ago Suspected 2019 novel coronavirus infection    [facility] (Indoor Clinic)    Nurse Only                                 Recent Outpatient Visits              3 months ago Encounter for annual health examination    [facility], Geoff Soto Arlinda, MD    Office Visit    9 months ago Acute cough    [facility]    Office Visit    1 year ago Inflamed seborrheic keratosis    [facility]    Office Visit    1 year ago Annual physical exam    [facility]    Office Visit    2 years ago Suspected 2019 novel coronavirus infection    [facility] (Indoor Clinic)    Nurse Only

## 2024-03-06 NOTE — TELEPHONE ENCOUNTER
Patient comment ;I will need this refill before my March 13th dental appointment. Thank you.  upad message sent for clarification ==listed as external/history 2022.          Please review; protocol failed/no protocol.     Requested Prescriptions   Pending Prescriptions Disp Refills    amoxicillin 500 MG Oral Cap  0       There is no refill protocol information for this order           Recent Outpatient Visits              3 months ago Encounter for annual health examination    [facility]    Office Visit    9 months ago Acute cough    [facility]    Office Visit    1 year ago Inflamed seborrheic keratosis    [facility] Bethanie Jiang MD    Office Visit    1 year ago Annual physical exam    [facility]    Office Visit    2 years ago Suspected 2019 novel coronavirus infection    [facility] (Indoor Clinic)    Nurse Only

## 2024-03-06 NOTE — TELEPHONE ENCOUNTER
Refill passed per [facility] protocol.     Requested Prescriptions   Pending Prescriptions Disp Refills    Glucose Blood (CONTOUR NEXT TEST) In Vitro Strip 100 strip 3     Sig: Use to test blood glucose once daily       Diabetic Supplies Protocol Passed - 3/6/2024 10:57 AM        Passed - In person appointment or virtual visit in the past 12 mos or appointment in next 3 mos     Recent Outpatient Visits              3 months ago Encounter for annual health examination    [facility], Geoff Soto Arlinda, MD    Office Visit    9 months ago Acute cough    [facility], Geoff Soto Arlinda, MD    Office Visit    1 year ago Inflamed seborrheic keratosis    [facility]    Office Visit    1 year ago Annual physical exam    [facility]    Office Visit    2 years ago Suspected 2019 novel coronavirus infection    [facility] (Indoor Clinic)    Nurse Only                                 Recent Outpatient Visits              3 months ago Encounter for annual health examination    [facility], Geoff Soto Arlinda, MD    Office Visit    9 months ago Acute cough    [facility]    Office Visit    1 year ago Inflamed seborrheic keratosis    [facility]    [facility] Visit    1 year ago Annual physical exam    [facility]    Office Visit    2 years ago Suspected 2019 novel coronavirus infection    [facility] (Indoor Clinic)    Nurse Only

## 2024-03-25 NOTE — TELEPHONE ENCOUNTER
Please review; protocol failed/No Protocol    LOV: 11/17/2023    No Active/Future labs pended     Requested Prescriptions   Pending Prescriptions Disp Refills    BENAZEPRIL-HYDROCHLOROTHIAZIDE 10-12.5 MG Oral Tab [Pharmacy Med Name: Benazepril Hydrochloride/Hydrochlorothiazide 10-12. Tab Anip] 90 tablet 0     Sig: TAKE ONE TABLET BY MOUTH ONE TIME DAILY       Hypertension Medications Protocol Failed - 3/23/2024  1:30 AM        Failed - CMP or BMP in past 12 months        Failed - EGFRCR or GFRNAA > 50     GFR Evaluation            Passed - Last BP reading less than 140/90     BP Readings from Last 1 Encounters:   11/17/23 135/75               Passed - In person appointment or virtual visit in the past 12 mos or appointment in next 3 mos     Recent Outpatient Visits              4 months ago Encounter for annual health examination    [facility], Geoff Soto Arlinda, MD    Office Visit    9 months ago Acute cough    [facility], Geoff Soto Arlinda, MD    Office Visit    1 year ago Inflamed seborrheic keratosis    [facility]    Office Visit    1 year ago Annual physical exam    [facility]    Office Visit    2 years ago Suspected 2019 novel coronavirus infection    [facility] (Indoor Clinic)    Nurse Only                           Recent Outpatient Visits              4 months ago Encounter for annual health examination    [facility]    Office Visit    9 months ago Acute cough    [facility]    Office Visit    1 year ago Inflamed seborrheic keratosis    [facility]    [facility]    1 year ago  Annual physical exam    [facility], [place] Geoff Novak Arlinda, MD    Office Visit    2 years ago Suspected 2019 novel coronavirus infection    [facility] (Indoor Clinic)    Nurse Only

## 2024-04-03 NOTE — TELEPHONE ENCOUNTER
Please review; protocol failed.    Requested Prescriptions   Pending Prescriptions Disp Refills    METFORMIN HCL 1000 MG Oral Tab [Pharmacy Med Name: Metformin Hydrochloride 1,000 Mg Tab Luis] 180 tablet 0     Sig: TAKE ONE TABLET BY MOUTH TWICE DAILY       Diabetes Medication Protocol Failed - 3/31/2024  1:30 AM        Failed - Microalbumin procedure in past 12 months or taking ACE/ARB        Failed - EGFRCR or GFRNAA > 50     GFR Evaluation            Failed - GFR in the past 12 months        Passed - Last A1C < 7.5 and within past 6 months     Lab Results   Component Value Date    A1C 7.0 (H) 11/17/2023             Passed - In person appointment or virtual visit in the past 6 mos or appointment in next 3 mos     Recent Outpatient Visits              4 months ago [facility]counter for annual health examination    [facility]    [facility]    10 months ago Acute cough    [facility]    Office Visit    1 year ago Inflamed seborrheic keratosis    [facility]    Office Visit    1 year ago Annual physical exam    [facility]    Office Visit    2 years ago Suspected 2019 novel coronavirus infection    [facility] (Indoor Clinic)    Nurse Only

## 2024-06-19 NOTE — TELEPHONE ENCOUNTER
Please review; protocol failed/ has no protocol      No active /future labs noted     Requested Prescriptions   Pending Prescriptions Disp Refills    BENAZEPRIL-HYDROCHLOROTHIAZIDE 10-12.5 MG Oral Tab [Pharmacy Med Name: Benazepril Hydrochloride/Hydrochlorothiazide 10-12. Tab Anip] 90 tablet 0     Sig: TAKE ONE TABLET BY MOUTH ONE TIME DAILY       Hypertension Medications Protocol Failed - 6/17/2024  9:29 AM        Failed - CMP or BMP in past 12 months        Failed - EGFRCR or GFRNAA > 50     GFR Evaluation            Passed - Last BP reading less than 140/90     BP Readings from Last 1 Encounters:   11/17/23 135/75               Passed - In person appointment or virtual visit in the past 12 mos or appointment in next 3 mos     Recent Outpatient Visits              7 months ago Encounter for annual health examination    [facility], Geoff Soto Arlinda, MD    Office Visit    1 year ago Acute cough    [facility]    Office Visit    1 year ago Inflamed seborrheic keratosis    [facility]    [facility] Visit    1 year ago Annual physical exam    [facility]    Office Visit    2 years ago Suspected 2019 novel coronavirus infection    [facility] (Indoor Clinic)    Nurse Only                         Recent Outpatient Visits              7 months ago Encounter for annual health examination    [facility]    Office Visit    1 year ago Acute cough    [facility]    Office Visit    1 year ago Inflamed seborrheic keratosis    [facility]    Office Visit    1 year ago Annual physical  exam    [facility] [place] Geoff Novak Arlinda, MD    Office Visit    2 years ago Suspected 2019 novel coronavirus infection    [facility] (Indoor Clinic)    Nurse Only

## 2024-07-10 RX ORDER — PANTOPRAZOLE SODIUM 40 MG/1
40 TABLET, DELAYED RELEASE ORAL
Qty: 90 TABLET | Refills: 3 | Status: SHIPPED | OUTPATIENT
Start: 2024-07-10

## 2024-07-10 RX ORDER — ATORVASTATIN CALCIUM 20 MG/1
20 TABLET, FILM COATED ORAL NIGHTLY
Qty: 90 TABLET | Refills: 3 | Status: SHIPPED | OUTPATIENT
Start: 2024-07-10

## 2024-07-10 NOTE — TELEPHONE ENCOUNTER
Please review. Rx failed/no protocol.    Requested Prescriptions   Pending Prescriptions Disp Refills    atorvastatin 20 MG Oral Tab [Pharmacy Med Name: Atorvastatin Calcium 20 Mg Tab Nort] 90 tablet 3     Sig: Take 1 tablet by mouth nightly.       Cholesterol Medication Protocol Failed - 7/7/2024  1:30 AM        Failed - ALT < 80     Lab Results   Component Value Date    ALT 23 02/21/2023             Failed - ALT resulted within past year        Failed - Lipid panel within past 12 months     Lab Results   Component Value Date    CHOLEST 128 02/21/2023    TRIG 196 (H) 02/21/2023    HDL 41 02/21/2023    LDL 55 02/21/2023    VLDL 28 02/21/2023    NONHDLC 87 02/21/2023             Passed - In person appointment or virtual visit in the past 12 mos or appointment in next 3 mos     Recent Outpatient Visits              7 months ago Encounter for annual health examination    [facility], Geoff Soto Arlinda, MD    Office Visit    1 year ago Acute cough    [facility], Geoff Soto Arlinda, MD    Office Visit    1 year ago Inflamed seborrheic keratosis    [facility]    [facility] Visit    1 year ago Annual physical exam    [facility], Geoff Soto Arlinda, MD    Office Visit    2 years ago Suspected 2019 novel coronavirus infection    [facility] (Indoor Clinic)    Nurse Only                       Signed Prescriptions Disp Refills    pantoprazole 40 MG Oral Tab EC 90 tablet 3     Sig: Take 1 tablet by mouth before breakfast.       Gastrointestional Medication Protocol Passed - 7/7/2024  1:30 AM        Passed - In person appointment or virtual visit in the past 12 mos or appointment in next 3 mos     Recent Outpatient Visits              7 months ago Encounter for annual health examination    [facility],  Geoff Soto Arlinda, MD    Office Visit    1 year ago Acute cough    [facility], Geoff Soto Arlinda, MD    Office Visit    1 year ago Inflamed seborrheic keratosis    [facility]    Office Visit    1 year ago Annual physical exam    [facility], Geoff Soto Arlinda, MD    Office Visit    2 years ago Suspected 2019 novel coronavirus infection    [facility] (Indoor Clinic)    Nurse Only                             Recent Outpatient Visits              7 months ago Encounter for annual health examination    [facility], Geoff Soto Arlinda, MD    Office Visit    1 year ago Acute cough    [facility], Geoff Soto Arlinda, MD    Office Visit    1 year ago Inflamed seborrheic keratosis    [facility], [facility], Bethanie Joyce MD    Office Visit    1 year ago Annual physical exam    [facility], Geoff Soto Arlinda, MD    Office Visit    2 years ago Suspected 2019 novel coronavirus infection    [facility] (Indoor Clinic)    Nurse Only

## 2024-07-15 RX ORDER — ATORVASTATIN CALCIUM 20 MG/1
20 TABLET, FILM COATED ORAL NIGHTLY
Qty: 90 TABLET | Refills: 0 | OUTPATIENT
Start: 2024-07-15

## 2024-07-23 RX ORDER — ATORVASTATIN CALCIUM 20 MG/1
20 TABLET, FILM COATED ORAL NIGHTLY
Qty: 90 TABLET | Refills: 0 | Status: SHIPPED | OUTPATIENT
Start: 2024-07-23

## 2024-07-23 RX ORDER — PANTOPRAZOLE SODIUM 40 MG/1
40 TABLET, DELAYED RELEASE ORAL
Qty: 90 TABLET | Refills: 0 | Status: SHIPPED | OUTPATIENT
Start: 2024-07-23

## 2024-07-23 NOTE — TELEPHONE ENCOUNTER
Refill passed per [facility] protocol.  Requested Prescriptions   Pending Prescriptions Disp Refills    atorvastatin 20 MG Oral Tab 90 tablet 0     Sig: Take 1 tablet (20 mg total) by mouth nightly.       Cholesterol Medication Protocol Failed - 7/18/2024 12:08 PM        Failed - ALT < 80     Lab Results   Component Value Date    ALT 23 02/21/2023             Failed - ALT resulted within past year        Failed - Lipid panel within past 12 months     Lab Results   Component Value Date    CHOLEST 128 02/21/2023    TRIG 196 (H) 02/21/2023    HDL 41 02/21/2023    LDL 55 02/21/2023    VLDL 28 02/21/2023    NONHDLC 87 02/21/2023             Passed - In person appointment or virtual visit in the past 12 mos or appointment in next 3 mos     Recent Outpatient Visits              8 months ago Encounter for annual health examination    [facility], Geoff Soto Arlinda, MD    Office Visit    1 year ago Acute cough    [facility]    Office Visit    1 year ago Inflamed seborrheic keratosis    [facility] Beth[facility] Jiang MD    Office Visit    1 year ago Annual physical exam    [facility]    Office Visit    2 years ago Suspected 2019 novel coronavirus infection    [facility] (Ind[facility] Clinic)    Nurse Only                        pantoprazole 40 MG Oral Tab EC 90 tablet 0     Sig: Take 1 tablet (40 mg total) by mouth before breakfast.       Gastrointestional Medication Protocol Passed - 7/18/2024 12:08 PM        Passed - In person appointment or virtual visit in the past 12 mos or appointment in next 3 mos     Recent Outpatient Visits              8 months ago Encounter for annual health examination    [facility], Geoff Soto Arlinda, MD    Office  Visit    1 year ago Acute cough    [facility], Geoff Soto Arlinda, MD    Office Visit    1 year ago Inflamed seborrheic keratosis    [facility]    Office Visit    1 year ago Annual physical exam    [facility], Geoff Soto Arlinda, MD    Office Visit    2 years ago Suspected 2019 novel coronavirus infection    [facility] (Indoor Clinic)    Nurse Only                         Recent Outpatient Visits              8 months ago Encounter for annual health examination    [facility], Geoff Soto Arlinda, MD    Office Visit    1 year ago Acute cough    [facility], Geoff Soto Arlinda, MD    Office Visit    1 year ago Inflamed seborrheic keratosis    [facility], [facility], Bethanie Joyce MD    Office Visit    1 year ago Annual physical exam    [facility]    Office Visit    2 years ago Suspected 2019 novel coronavirus infection    [facility] (Indoor Clinic)    Nurse Only

## 2024-08-26 ENCOUNTER — TELEPHONE (OUTPATIENT)
Dept: INTERNAL MEDICINE CLINIC | Facility: CLINIC | Age: 69
End: 2024-08-26

## 2024-08-26 RX ORDER — AMOXICILLIN 500 MG/1
TABLET, FILM COATED ORAL
Qty: 12 TABLET | Refills: 0 | Status: CANCELLED | OUTPATIENT
Start: 2024-08-26

## 2024-08-26 NOTE — TELEPHONE ENCOUNTER
Verified name and .    Patient states she has upcoming dental procedure on 24 and is requesting a prescription for prophylactic antibiotic as she had knee replacement in the past.    She states that she normally takes four 500 mg capsules of amoxicillin 1 hour prior to the dental appointment and normally gets enough for a couple of doses.    Medication pended for your review and approval.

## 2024-08-30 NOTE — TELEPHONE ENCOUNTER
Disp Refills Start End    amoxicillin 500 MG Oral Cap 4 capsule 0 8/26/2024 --    Sig: Take 4 pills 1 hour prior to procedure    Sent to pharmacy as: Amoxicillin 500 MG Oral Capsule (Amoxil)    E-Prescribing Status: Receipt confirmed by pharmacy (8/26/2024 12:46 PM CDT)      Pharmacy    [id number] - [place], [place] - [address] [phone], [phone]

## 2024-09-20 RX ORDER — BENAZEPRIL/HYDROCHLOROTHIAZIDE 10-12.5 MG
1 TABLET ORAL DAILY
Qty: 90 TABLET | Refills: 3 | Status: SHIPPED | OUTPATIENT
Start: 2024-09-20

## 2024-09-20 NOTE — TELEPHONE ENCOUNTER
Please review. Protocol Failed; No Protocol    Requested Prescriptions   Pending Prescriptions Disp Refills    BENAZEPRIL-HYDROCHLOROTHIAZIDE 10-12.5 MG Oral Tab [Pharmacy Med Name: Benazepril Hydrochloride/Hydrochlorothiazide 10-12. Tab Sourav] 90 tablet 0     Sig: TAKE ONE TABLET BY MOUTH ONE TIME DAILY       Hypertension Medications Protocol Failed - 9/16/2024  9:25 AM        Failed - CMP or BMP in past 12 months        Failed - EGFRCR or GFRNAA > 50     GFR Evaluation            Passed - Last BP reading less than 140/90     BP Readings from Last 1 Encounters:   11/17/23 135/75               Passed - In person appointment or virtual visit in the past 12 mos or appointment in next 3 mos     Recent Outpatient Visits              10 months ago Encounter for annual health examination    [facility], Geoff Soto Arlinda, MD    Office Visit    1 year ago Acute cough    [facility]    Office Visit    2 years ago Inflamed seborrheic keratosis    [facility]    Office Visit    2 years ago Annual physical exam    [facility]    Office Visit    2 years ago Suspected 2019 novel coronavirus infection    [facility]    Nurse Only                               Recent Outpatient Visits              10 months ago Encounter for annual health examination    [facility]    Office Visit    1 year ago Acute cough    [facility]    Office Visit    2 years ago Inflamed seborrheic keratosis    [facility]    Office Visit    2 years ago Annual physical exam    [facility], Geoff Soto Arlinda, MD    Office Visit    2 years ago Suspected 2019 novel coronavirus infection    [facility] (Indoor Clinic)    Nurse Only

## 2024-09-30 ENCOUNTER — IMMUNIZATION (OUTPATIENT)
Dept: LAB | Age: 69
End: 2024-09-30
Attending: EMERGENCY MEDICINE
Payer: MEDICARE

## 2024-09-30 DIAGNOSIS — Z23 NEED FOR VACCINATION: Primary | ICD-10-CM

## 2024-09-30 PROCEDURE — 90662 IIV NO PRSV INCREASED AG IM: CPT

## 2024-09-30 PROCEDURE — 90471 IMMUNIZATION ADMIN: CPT

## 2024-09-30 PROCEDURE — 90480 ADMN SARSCOV2 VAC 1/ONLY CMP: CPT

## 2024-09-30 RX ORDER — AMOXICILLIN 500 MG/1
CAPSULE ORAL
Qty: 8 CAPSULE | Refills: 1 | Status: SHIPPED | OUTPATIENT
Start: 2024-09-30

## 2024-09-30 NOTE — TELEPHONE ENCOUNTER
Please review.  Protocol failed / Has no protocol.     Needed for Dentist visit on Wed 10/2/24    Requested Prescriptions   Pending Prescriptions Disp Refills    amoxicillin 500 MG Oral Cap 8 capsule 1     Sig: Take 4 pills 1 hour prior to procedure       There is no refill protocol information for this order          Recent Outpatient Visits              10 months ago Encounter for annual health examination    [facility], Geoff Soto Arlinda, MD    Office Visit    1 year ago Acute cough    [facility]    Office Visit    2 years ago Inflamed seborrheic keratosis    [facility] Bethanie Jiang MD    Office Visit    2 years ago Annual physical exam    [facility]    Office Visit    2 years ago Suspected 2019 novel coronavirus infection    [facility] (Indoor Clinic)    Nurse Only

## 2024-10-19 ENCOUNTER — TELEPHONE (OUTPATIENT)
Dept: INTERNAL MEDICINE CLINIC | Facility: CLINIC | Age: 69
End: 2024-10-19

## 2024-10-22 RX ORDER — ATORVASTATIN CALCIUM 20 MG/1
20 TABLET, FILM COATED ORAL NIGHTLY
Qty: 90 TABLET | Refills: 3 | Status: SHIPPED | OUTPATIENT
Start: 2024-10-22

## 2024-10-22 NOTE — TELEPHONE ENCOUNTER
Please review. Protocol Failed; No Protocol    No future appointments.    Plair message sent to patient to schedule an office visit with Provider and/or  Routed to Patient  for assistance with appointment.     Requested Prescriptions   Pending Prescriptions Disp Refills    ATORVASTATIN 20 MG Oral Tab [Pharmacy Med Name: Atorvastatin Calcium 20 Mg Tab Nort] 90 tablet 0     Sig: Take 1 tablet by mouth nightly       Cholesterol Medication Protocol Failed - 10/22/2024  9:59 AM        Failed - ALT < 80     Lab Results   Component Value Date    ALT 23 02/21/2023             Failed - ALT resulted within past year        Failed - Lipid panel within past 12 months     Lab Results   Component Value Date    CHOLEST 128 02/21/2023    TRIG 196 (H) 02/21/2023    HDL 41 02/21/2023    LDL 55 02/21/2023    VLDL 28 02/21/2023    NONHDLC 87 02/21/2023             Passed - In person appointment or virtual visit in the past 12 mos or appointment in next 3 mos     Recent Outpatient Visits              11 months ago Encounter for annual health examination    [place], Geoff Soto Arlinda, MD    Office Visit    1 year ago Acute cough    [facility]    Office Visit    2 years ago Inflamed seborrheic keratosis    [place] [facility]    Office Visit    2 years ago Annual physical exam    [facility]    Office Visit    2 years ago Suspected 2019 novel coronavirus infection    [facility] (Indoor Clinic)    Nurse Only                               Recent Outpatient Visits              11 months ago Encounter for annual health examination    [facility]    Office Visit    1 year ago Acute cough    [place]  [facility]    Office Visit    2 years ago Inflamed seborrheic keratosis    [facility], [facility] Bethanie Jiang MD    Office Visit    2 years ago Annual physical exam    [facility] [place] [place]    Office Visit    2 years ago Suspected 2019 novel coronavirus infection    [facility] (Indoor Clinic)    Nurse Only

## 2024-10-24 RX ORDER — PANTOPRAZOLE SODIUM 40 MG/1
40 TABLET, DELAYED RELEASE ORAL
Qty: 90 TABLET | Refills: 3 | Status: SHIPPED | OUTPATIENT
Start: 2024-10-24

## 2024-10-24 NOTE — TELEPHONE ENCOUNTER
Refill Per Protocol     Requested Prescriptions   Pending Prescriptions Disp Refills    PANTOPRAZOLE 40 MG Oral Tab EC [Pharmacy Med Name: Pantoprazole Sodium Ec 40 Mg Tab Nort] 90 tablet 0     Sig: Take 1 tablet by mouth before breakfast.       Gastrointestional Medication Protocol Passed - 10/24/2024 11:13 AM        Passed - In person appointment or virtual visit in the past 12 mos or appointment in next 3 mos     Recent Outpatient Visits              11 months ago Encounter for annual health examination    [facility], Geoff Soto Arlinda, MD    Office Visit    1 year ago Acute cough    [facility]    Office Visit    2 years ago Inflamed seborrheic keratosis    [facility]    Office Visit    2 years ago Annual physical exam    [facility]    Office Visit    2 years ago Suspected 2019 novel coronavirus infection    [facility] (Ind[facility] Clinic)    Nurse Only                               Recent Outpatient Visits              11 months ago Encounter for annual health examination    [facility], Geoff Soto Arlinda, MD    Office Visit    1 year ago Acute cough    [facility], Geoff Soto Arlinda, MD    Office Visit    2 years ago Inflamed seborrheic keratosis    [facility]    Office Visit    2 years ago Annual physical exam    [facility]    Office Visit    2 years ago Suspected 2019 novel coronavirus infection    [facility]    Nurse Only

## 2024-12-24 NOTE — TELEPHONE ENCOUNTER
Please review; protocol failed/ has no protocol    Please see message below for upcoming appointment.    Future Appointments   Date Time Provider Department Center   12/31/2024  9:15 AM Mary Thomson MD [facility]       Requested Prescriptions   Pending Prescriptions Disp Refills    METFORMIN HCL 1000 MG Oral Tab [Pharmacy Med Name: Metformin Hydrochloride 1,000 Mg Tab Luis] 180 tablet 0     Sig: TAKE ONE TABLET BY MOUTH TWICE DAILY       Diabetes Medication Protocol Failed - 12/24/2024  9:58 AM        Failed - Last A1C < 7.5 and within past 6 months     Lab Results   Component Value Date    A1C 7.0 (H) 11/17/2023             Failed - Microalbumin procedure in past 12 months or taking ACE/ARB        Failed - EGFRCR or GFRNAA > 50     GFR Evaluation            Failed - GFR in the past 12 months        Passed - In person appointment or virtual visit in the past 6 mos or appointment in next 3 mos     Recent Outpatient Visits              1 year ago Encounter for annual health examination    [facility] [place]    Office Visit    1 year ago Acute cough    [facility] [place] [facility] [place]    Office Visit    2 years ago Inflamed seborrheic keratosis    [facility] Bethanie Jiang MD    Office Visit    2 years ago Annual physical exam    [facility] [place] [facility] [place]    Office Visit    3 years ago Suspected 2019 novel coronavirus infection    [facility] (Indoor Clinic)    Nurse Only          Future Appointments         Provider Department Appt Notes    In 1 week Mary Thomson MD [facility], [place], [place], [place] last px 11-17-23 Annual physical                       Recent Outpatient Visits              1 year ago Encounter for annual health examination    [facility], Geoff Soto Arlinda, MD    Office Visit    1 year ago Acute cough    [facility], Geoff Soto Arlinda, MD    Office Visit    2 years ago Inflamed seborrheic keratosis    [facility], [facility] Bethanie Jiang MD    Office Visit    2 years ago Annual physical exam    [facility], Geoff Soto Arlinda, MD    Office Visit    3 years ago Suspected 2019 novel coronavirus infection    [facility] (Indoor Clinic)    Nurse Only          Future Appointments         Provider Department Appt Notes    In 1 week Mary Thomson MD [facility], [place], [place] last px 11-17-23 Annual physical

## 2024-12-31 ENCOUNTER — OFFICE VISIT (OUTPATIENT)
Dept: INTERNAL MEDICINE CLINIC | Facility: CLINIC | Age: 69
End: 2024-12-31
Payer: MEDICARE

## 2024-12-31 ENCOUNTER — LAB ENCOUNTER (OUTPATIENT)
Dept: LAB | Facility: REFERENCE LAB | Age: 69
End: 2024-12-31
Attending: INTERNAL MEDICINE
Payer: MEDICARE

## 2024-12-31 VITALS
HEIGHT: 63 IN | SYSTOLIC BLOOD PRESSURE: 135 MMHG | DIASTOLIC BLOOD PRESSURE: 80 MMHG | HEART RATE: 73 BPM | BODY MASS INDEX: 46.78 KG/M2 | TEMPERATURE: 98 F | OXYGEN SATURATION: 97 % | WEIGHT: 264 LBS

## 2024-12-31 DIAGNOSIS — Z00.00 MEDICARE ANNUAL WELLNESS VISIT, SUBSEQUENT: Primary | ICD-10-CM

## 2024-12-31 DIAGNOSIS — Z12.31 ENCOUNTER FOR SCREENING MAMMOGRAM FOR MALIGNANT NEOPLASM OF BREAST: ICD-10-CM

## 2024-12-31 DIAGNOSIS — E11.9 TYPE 2 DIABETES MELLITUS WITHOUT COMPLICATION, WITHOUT LONG-TERM CURRENT USE OF INSULIN (HCC): ICD-10-CM

## 2024-12-31 DIAGNOSIS — Z00.00 ANNUAL PHYSICAL EXAM: ICD-10-CM

## 2024-12-31 DIAGNOSIS — Z12.11 SCREENING FOR COLON CANCER: ICD-10-CM

## 2024-12-31 DIAGNOSIS — Z00.00 ENCOUNTER FOR ANNUAL HEALTH EXAMINATION: ICD-10-CM

## 2024-12-31 DIAGNOSIS — E66.01 OBESITY, MORBID (HCC): ICD-10-CM

## 2024-12-31 PROBLEM — K21.9 GERD (GASTROESOPHAGEAL REFLUX DISEASE): Status: RESOLVED | Noted: 2017-02-17 | Resolved: 2024-12-31

## 2024-12-31 LAB
ALBUMIN SERPL-MCNC: 4.4 G/DL (ref 3.2–4.8)
ALBUMIN/GLOB SERPL: 1.7 {RATIO} (ref 1–2)
ALP LIVER SERPL-CCNC: 89 U/L
ALT SERPL-CCNC: 17 U/L
ANION GAP SERPL CALC-SCNC: 9 MMOL/L (ref 0–18)
AST SERPL-CCNC: 17 U/L (ref ?–34)
BASOPHILS # BLD AUTO: 0.06 X10(3) UL (ref 0–0.2)
BASOPHILS NFR BLD AUTO: 0.9 %
BILIRUB SERPL-MCNC: 0.4 MG/DL (ref 0.2–1.1)
BUN BLD-MCNC: 14 MG/DL (ref 9–23)
BUN/CREAT SERPL: 15.9 (ref 10–20)
CALCIUM BLD-MCNC: 9.5 MG/DL (ref 8.7–10.4)
CHLORIDE SERPL-SCNC: 108 MMOL/L (ref 98–112)
CHOLEST SERPL-MCNC: 162 MG/DL (ref ?–200)
CO2 SERPL-SCNC: 25 MMOL/L (ref 21–32)
CREAT BLD-MCNC: 0.88 MG/DL
CREAT UR-SCNC: 104 MG/DL
DEPRECATED RDW RBC AUTO: 41.7 FL (ref 35.1–46.3)
EGFRCR SERPLBLD CKD-EPI 2021: 71 ML/MIN/1.73M2 (ref 60–?)
EOSINOPHIL # BLD AUTO: 0.14 X10(3) UL (ref 0–0.7)
EOSINOPHIL NFR BLD AUTO: 2.2 %
ERYTHROCYTE [DISTWIDTH] IN BLOOD BY AUTOMATED COUNT: 14 % (ref 11–15)
EST. AVERAGE GLUCOSE BLD GHB EST-MCNC: 163 MG/DL (ref 68–126)
FASTING PATIENT LIPID ANSWER: NO
FASTING STATUS PATIENT QL REPORTED: NO
GLOBULIN PLAS-MCNC: 2.6 G/DL (ref 2–3.5)
GLUCOSE BLD-MCNC: 154 MG/DL (ref 70–99)
HBA1C MFR BLD: 7.3 % (ref ?–5.7)
HCT VFR BLD AUTO: 42.7 %
HDLC SERPL-MCNC: 39 MG/DL (ref 40–59)
HGB BLD-MCNC: 14.3 G/DL
IMM GRANULOCYTES # BLD AUTO: 0.01 X10(3) UL (ref 0–1)
IMM GRANULOCYTES NFR BLD: 0.2 %
LDLC SERPL CALC-MCNC: 79 MG/DL (ref ?–100)
LYMPHOCYTES # BLD AUTO: 1.43 X10(3) UL (ref 1–4)
LYMPHOCYTES NFR BLD AUTO: 22.2 %
MCH RBC QN AUTO: 27.6 PG (ref 26–34)
MCHC RBC AUTO-ENTMCNC: 33.5 G/DL (ref 31–37)
MCV RBC AUTO: 82.4 FL
MICROALBUMIN UR-MCNC: <0.3 MG/DL
MONOCYTES # BLD AUTO: 0.52 X10(3) UL (ref 0.1–1)
MONOCYTES NFR BLD AUTO: 8.1 %
NEUTROPHILS # BLD AUTO: 4.27 X10 (3) UL (ref 1.5–7.7)
NEUTROPHILS # BLD AUTO: 4.27 X10(3) UL (ref 1.5–7.7)
NEUTROPHILS NFR BLD AUTO: 66.4 %
NONHDLC SERPL-MCNC: 123 MG/DL (ref ?–130)
OSMOLALITY SERPL CALC.SUM OF ELEC: 298 MOSM/KG (ref 275–295)
PLATELET # BLD AUTO: 279 10(3)UL (ref 150–450)
POTASSIUM SERPL-SCNC: 3.7 MMOL/L (ref 3.5–5.1)
PROT SERPL-MCNC: 7 G/DL (ref 5.7–8.2)
RBC # BLD AUTO: 5.18 X10(6)UL
SODIUM SERPL-SCNC: 142 MMOL/L (ref 136–145)
TRIGL SERPL-MCNC: 266 MG/DL (ref 30–149)
TSI SER-ACNC: 3 UIU/ML (ref 0.55–4.78)
VLDLC SERPL CALC-MCNC: 42 MG/DL (ref 0–30)
WBC # BLD AUTO: 6.4 X10(3) UL (ref 4–11)

## 2024-12-31 PROCEDURE — 82043 UR ALBUMIN QUANTITATIVE: CPT

## 2024-12-31 PROCEDURE — 84443 ASSAY THYROID STIM HORMONE: CPT

## 2024-12-31 PROCEDURE — 80053 COMPREHEN METABOLIC PANEL: CPT

## 2024-12-31 PROCEDURE — 83036 HEMOGLOBIN GLYCOSYLATED A1C: CPT

## 2024-12-31 PROCEDURE — 36415 COLL VENOUS BLD VENIPUNCTURE: CPT

## 2024-12-31 PROCEDURE — 80061 LIPID PANEL: CPT

## 2024-12-31 PROCEDURE — 82570 ASSAY OF URINE CREATININE: CPT

## 2024-12-31 PROCEDURE — 85025 COMPLETE CBC W/AUTO DIFF WBC: CPT

## 2024-12-31 NOTE — PROGRESS NOTES
Subjective:   Chantelle Al is a 69 year old female who presents for a Medicare Subsequent Annual Wellness visit (Pt already had Initial Annual Wellness) and scheduled follow up of multiple significant but stable problems.       History/Other:   Fall Risk Assessment:   She has been screened for Falls and is low risk.      Cognitive Assessment:   Abnormal  What day of the week is this?: Incorrect  What month is it?: Correct  What year is it?: Correct  Recall \"Ball\": Correct  Recall \"Flag\": Correct  Recall \"Tree\": Correct    Functional Ability/Status:   Chantelle Al has some abnormal functions as listed below:  She has Hearing problems based on screening of functional status.She has Vision problems based on screening of functional status.       Depression Screening (PHQ):  PHQ-2 SCORE: 0  , done 12/31/2024   Last [place] Suicide Screening on 12/31/2024 was No Risk.     5 minutes spent screening and counseling for depression    Advanced Directives:   She does NOT have a Living Will. [Do you have a living will?: (Patient-Rptd) No]  She does NOT have a Power of  for Health Care. [Do you have a healthcare power of ?: (Patient-Rptd) No]  Discussed Advance Care Planning with patient (and family/surrogate if present). Standard forms made available to patient in After Visit Summary.      Patient Active Problem List   Diagnosis    Senile cataract    HTN (hypertension)    Dyslipidemia    Type 2 diabetes mellitus without complication, without long-term current use of insulin (HCC)    GERD (gastroesophageal reflux disease)    JOHN on CPAP    Scarring alopecia    Lichen planopilaris    Obesity, morbid (HCC)     Allergies:  She is allergic to pollen.    Current Medications:  Outpatient Medications Marked as Taking for the 12/31/24 encounter (Office Visit) with Mary Thomson MD   Medication Sig    metFORMIN HCl 1000 MG Oral Tab Take 1 tablet (1,000 mg total) by mouth 2 (two) times daily.    pantoprazole  40 MG Oral Tab EC Take 1 tablet (40 mg total) by mouth before breakfast.    atorvastatin 20 MG Oral Tab Take 1 tablet (20 mg total) by mouth nightly.    Benazepril-hydroCHLOROthiazide 10-12.5 MG Oral Tab Take 1 tablet by mouth daily.    Microlet Lancets Does not apply Misc Use to test blood glucose once daily    Glucose Blood (CONTOUR NEXT TEST) In Vitro Strip Use to test blood glucose once daily       Medical History:  She  has a past medical history of Allergic rhinitis (13 years of age), Annual physical exam, Arthritis, Blepharitis (), Conjunctivitis (2009), Depression, Dermatitis, Diabetes (HCC), Diabetes mellitus (HCC), Diarrhea of presumed infectious origin, Diverticular disease, Diverticulitis, Diverticulosis of large intestine, Esophageal reflux, Family history of malignant neoplasm of breast, High blood pressure, High cholesterol, History of diverticulitis of colon, History of total knee arthroplasty, Iron deficiency anemia, Obesity, Osteoarthritis, Other and unspecified hyperlipidemia, Primary osteoarthritis of left knee, Primary osteoarthritis of right knee, S/P total knee arthroplasty, right, Sleep apnea, Total knee replacement status, right, Unspecified essential hypertension, and Visual impairment.  Surgical History:  She  has a past surgical history that includes bso, omentectomy w/dolly (); colonoscopy (2014);  (1977);  (1982);  (1985); tonsillectomy; d & c; hysterectomy (1996); total knee replacement (Left, 07/10/2017); total knee replacement (Right, 2017); knee replacement surgery (7/10/2017, 2017); and other surgical history (D & C in ).   Family History:  Her family history includes Breast Cancer (age of onset: 30) in her maternal cousin female; Breast Cancer (age of onset: 40) in her maternal aunt; Breast Cancer (age of onset: 47) in her mother; Breast Cancer (age of onset: 70) in her maternal aunt; Cancer in  her mother; Cancer (age of onset: 52) in her maternal cousin female; Cancer (age of onset: 59) in her paternal grandfather; Cancer (age of onset: 71) in her maternal aunt; Cancer (age of onset: 73) in her maternal grandfather; Dementia in her maternal grandmother; Diabetes in her father and mother; Heart Disorder in her father; Hypertension in her mother.  Social History:  She  reports that she has never smoked. She has never used smokeless tobacco. She reports that she does not currently use alcohol. She reports that she does not use drugs.    Tobacco:  She has never smoked tobacco.    CAGE Alcohol Screen:   CAGE screening score of 0 on 12/30/2024, showing low risk of alcohol abuse.      Patient Care Team:  Mary Thomson MD as PCP - General (Internal Medicine)  Apolinar Yap MD (SURGERY, ORTHOPEDIC)    Review of Systems  GENERAL: feels well otherwise  SKIN: denies any unusual skin lesions  EYES: denies blurred vision or double vision  HEENT: denies nasal congestion, sinus pain or ST  LUNGS: denies shortness of breath with exertion  CARDIOVASCULAR: denies chest pain on exertion  GI: denies abdominal pain, denies heartburn  : denies dysuria, vaginal discharge or itching, no complaint of urinary incontinence   MUSCULOSKELETAL: denies back pain  NEURO: denies headaches  PSYCHE: denies depression or anxiety  HEMATOLOGIC: denies hx of anemia  ENDOCRINE: denies thyroid history  ALL/ASTHMA: denies hx of allergy or asthma    Objective:   Physical Exam  General Appearance:  Alert, cooperative, no distress, appears stated age   Head:  Normocephalic, without obvious abnormality, atraumatic   Eyes:  PERRL, conjunctiva/corneas clear, EOM's intact both eyes   Ears:  Normal TM's and external ear canals, both ears   Nose: Nares normal, septum midline,mucosa normal, no drainage or sinus tenderness   Throat: Lips, mucosa, and tongue normal; teeth and gums normal   Neck: Supple, symmetrical, trachea midline, no adenopathy;   thyroid: not enlarged, symmetric, no tenderness/mass/nodules; no carotid bruit or JVD   Back:   Symmetric, no curvature, ROM normal, no CVA tenderness   Lungs:   Clear to auscultation bilaterally, respirations unlabored   Heart:  Regular rate and rhythm, S1 and S2 normal, no murmur, rub, or gallop   Abdomen:   Soft, non-tender, bowel sounds active all four quadrants,  no masses, no organomegaly   Pelvic: Deferred   Extremities: Extremities normal, atraumatic, no cyanosis or edema   Pulses: 2+ and symmetric   Skin: Skin color, texture, turgor normal, no rashes or lesions   Lymph nodes: Cervical, supraclavicular, and axillary nodes normal   Neurologic: Normal       /72 (BP Location: Left arm, Patient Position: Sitting, Cuff Size: large)   Pulse 73   Temp 97.6 °F (36.4 °C) (Temporal)   Ht 5' 3\" (1.6 m)   Wt 264 lb (119.7 kg)   SpO2 97%   BMI 46.77 kg/m²  Estimated body mass index is 46.77 kg/m² as calculated from the following:    Height as of this encounter: 5' 3\" (1.6 m).    Weight as of this encounter: 264 lb (119.7 kg).    Medicare Hearing Assessment:   Hearing Screening    Screening Method: Questionnaire  I have a problem hearing over the telephone: No I have trouble following the conversations when two or more people are talking at the same time: No   I have trouble understanding things on the TV: No I have to strain to understand conversations: No   I have to worry about missing the telephone ring or doorbell: No I have trouble hearing conversations in a noisy background such as a crowded room or restaurant: Yes   I get confused about where sounds come from: No I misunderstand some words in a sentence and need to ask people to repeat themselves: No   I especially have trouble understanding the speech of women and children: No I have trouble understanding the speaker in a large room such as at a meeting or place of Denominational: No   Many people I talk to seem to mumble (or don't speak clearly): No People  get annoyed because I misunderstand what they say: No   I misunderstand what others are saying and make inappropriate responses: No I avoid social activities because I cannot hear well and fear I will reply improperly: No   Family members and friends have told me they think I may have hearing loss: No             Visual Acuity:     Right Eye Chart Acuity: 20/30     Left Eye Chart Acuity: 20/30     Both Eyes Chart Acuity: 20/30            Assessment & Plan:   Chantelle Al is a 69 year old female who presents for a Medicare Assessment.     1. Medicare annual wellness visit, subsequent (Primary)  2. Obesity, morbid (HCC)advised her for diet and exercise  3. Type 2 diabetes mellitus without complication, without long-term current use of insulin ([facility]) will check hba1C, urin microalbumin  -     Hemoglobin A1C; Future; Expected date: 12/31/2024  -     Microalb/Creat Ratio, Random Urine; Future; Expected date: 12/31/2024  4. Encounter for annual health examination  5. Annual physical exam  -     CBC With Differential With Platelet; Future; Expected date: 12/31/2024  -     Comp Metabolic Panel (14); Future; Expected date: 12/31/2024  -     TSH W Reflex To Free T4; Future; Expected date: 12/31/2024  -     Lipid Panel; Future; Expected date: 12/31/2024  6. Encounter for screening mammogram for malignant neoplasm of breast mammogram  -     [facility] AMANDA 2D+3D SCREENING BILAT (CPT=[id number]); Future; Expected date: 12/31/2024  7. Screening for colon cancer gi  -     [facility] Telephone Colon Screen; Future; Expected date: 01/07/2025  8.htn- monitor bp , continue with current medications   9.leila - not complaint with her cpap- I did advise her to use   10 dyslipidemia - willwill request records from her eye doctor t check lipid panel      patient indicates understanding of these issues and agrees to the plan.  Reinforced healthy diet, lifestyle, and exercise.      No follow-ups on file.     JOSE ANGEL GARCIA MD, 12/31/2024      Supplementary Documentation:   General Health:  In the past six months, have you lost more than 10 pounds without trying?: (Patient-Rptd) 2 - No  Has your appetite been poor?: (Patient-Rptd) No  Type of Diet: (Patient-Rptd) Balanced;Diabetic;Low Salt  How does the patient maintain a good energy level?: (Patient-Rptd) Other  How would you describe your daily physical activity?: (Patient-Rptd) Light  How would you describe your current health state?: (Patient-Rptd) Good  How do you maintain positive mental well-being?: (Patient-Rptd) Social Interaction;Puzzles;Visiting Friends;Visiting Family  On a scale of 0 to 10, with 0 being no pain and 10 being severe pain, what is your pain level?: (Patient-Rptd) 0 - (None)  In the past six months, have you experienced urine leakage?: (Patient-Rptd) 0-No  At any time do you feel concerned for the safety/well-being of yourself and/or your children, in your home or elsewhere?: (Patient-Rptd) No  Have you had any immunizations at another office such as Influenza, Hepatitis B, Tetanus, or Pneumococcal?: (Patient-Rptd) Yes    Health Maintenance   Topic Date Due    Zoster Vaccines (1 of 2) Never done    Diabetes Care: GFR  02/21/2024    Diabetes Care: Microalb/Creat Ratio  02/21/2024    Diabetes Care A1C  05/17/2024    Diabetes Care Dilated Eye Exam  10/31/2024    Colorectal Cancer Screening  11/01/2024    Diabetes Care Foot Exam  11/17/2024    Annual Physical  11/17/2024    Mammogram  12/18/2024    Influenza Vaccine  Completed    DEXA Scan  Completed    Annual Depression Screening  Completed    Fall Risk Screening (Annual)  Completed    Pneumococcal Vaccine: 65+ Years  Completed    COVID-19 Vaccine  Completed

## 2025-01-22 ENCOUNTER — TELEPHONE (OUTPATIENT)
Facility: CLINIC | Age: 70
End: 2025-01-22

## 2025-01-22 DIAGNOSIS — Z12.11 SPECIAL SCREENING FOR MALIGNANT NEOPLASMS, COLON: Primary | ICD-10-CM

## 2025-01-22 RX ORDER — PHENOL 1.4 %
600 AEROSOL, SPRAY (ML) MUCOUS MEMBRANE DAILY
COMMUNITY

## 2025-01-22 RX ORDER — SODIUM, POTASSIUM,MAG SULFATES 17.5-3.13G
SOLUTION, RECONSTITUTED, ORAL ORAL
Qty: 1 EACH | Refills: 0 | Status: SHIPPED | OUTPATIENT
Start: 2025-01-22

## 2025-01-22 RX ORDER — IBUPROFEN 800 MG
1 TABLET ORAL DAILY
COMMUNITY

## 2025-01-22 NOTE — TELEPHONE ENCOUNTER
If no symptoms may schedule a screening colonoscopy following a split dose Suprep and monitored anesthesia care.  Hold metformin the evening before and the morning of the procedure and the sitagliptin for 4 days preceding the procedure.

## 2025-01-22 NOTE — TELEPHONE ENCOUNTER
Scheduled for:  Colonoscopy [id number]   Provider Name:  Dr. Velasco  Date:  6/24/2025  Location:  [facility]  Sedation:  MAC  Time:  9:55am, pt is aware emh will call with arrival time  Prep:  Suprep  Meds/Allergies Reconciled?:  Physician reviewed     Diagnosis with codes:  Colon Cancer Screen Z12.11  Was patient informed to call insurance with codes (Y/N): Yes      Referral sent?:  Referral was sent at the time of electronic surgical scheduling.   EM or [facility] notified?:  I sent an electronic request to Endo Scheduling and received a confirmation today.      Medication Orders:  Hold metformin the evening before and the morning of the procedure and the sitagliptin for 4 days preceding the procedure.   Misc Orders:  n/a     Further instructions given by staff:   I discussed the prep instructions with the patient which she verbally understood and is aware that I will mail the instructions today.

## 2025-01-22 NOTE — TELEPHONE ENCOUNTER
Dr Merrill  Called patient for a CLN recall, date of birth and name verified.  Please advise on colonoscopy and bowel prep orders.   Medications, pharmacy, and allergies reviewed.    >Insurance:Medicare                                                                                                                                                                                       › Last PCP visit: 12/31/2024  › Last CBC/CMP: 12/31/2024  › H/W/BMI: 5'3.5/ 258 lbs/ 45    Special comments/notes:  Recall    Last Procedure, Date, MD:    11/21/2014 CLN by Dr MELGAR   Last diagnosis:  Fragments of hyperplastic polyp   Recalled for (mth/yrs):  10 years (Due November 2024)   Sedation used previously:  IV   Last Prep Used:  miralax   Quality of Prep:  Very good     Telephone Colon Screening Questionnaire Yes No   Are you currently experiencing any GI symptoms [] [x]   If yes, explain:     Rectal bleeding [] [x]   Black stool [] [x]   Dysphagia or food \"feeling stuck\" when eating [] [x]   Intractable vomiting [] [x]   Unexplained weight loss [] [x]   First colonoscopy [] [x]   Family history of colon cancer [] [x]   Any issues with anesthesia [] [x]   If yes, explain:      Any recent complaints related to chest pain &/or shortness of breath [] [x]   Referred to a cardiologist?  [] [x]   If yes, explain:      History of  respiratory issues/oxygen/JOHN/COPD: JOHN [x] []   CPAP/BiPAP: not using     History of devices (pacemaker/defibrillator) [] [x]   History of heart attack &/or stroke [] [x]   If yes, in the last 12 months? Stent placement?  [] [x]     Medication Reconciliation  Yes  No   Anticoagulants (except Aspirin) [] [x]   Diabetic Medication: Januvia [x] []   Weight loss medication (phentermine/vyvanse/saxsenda/etc) [] [x]   Iron/herbal/multivitamin supplement(s): Vit D3, calcium [x] []   Usage of marijuana, CBD &/or vape product(s) [] [x]          Garfield Velasco MD       11/26/14  5:23 PM  Note  The patient feels  well.  Her polyps were hyperplastic.  Ileal biopsies were nonspecific.  I have recommended a follow-up screening colonoscopy in 10 years unless any new symptoms or signs are noted.        Pathology-Surgical: Patient Communication     Add Comments   Add Notifications        Pathology-Surgical: Result Notes    Older Notes       Notes Recorded by Garfield Velasco MD on 11/26/2014 at 5:36 PM  See 11/26/14 telephone encounter.            Pathology-Surgical  Order: [id number]   Status: Final result       Visible to patient: Yes (not seen)    1 Result Note      Component 10 yr ago   SURGICAL PATHOLOGY REPORT   --------------------------------------------------------------------------------        FINAL DIAGNOSIS                      A. Ileal erosions; biopsy:    *  Small bowel mucosa with focal mild active ileitis, nonspecific.    *  No evidence of granuloma.     B. Rectal polyp x3; biopsy:    *  Fragments of hyperplastic polyp.    *  See comment.     Comment:  Specimen B is labeled as rectal polyp x3. Two fragments of colonic  tissue are identified, both of which show changes consistent with hyperplastic  polyp. Remainder of the specimen consists of undigested food material.           TISSUES SUBMITTED                 A. Small intestine, ileum biopsy  B. Polyp, Rectal     CLINICAL HISTORY:                 Screening. Diverticulosis, colon polyp x3. Ileal erosions.     GROSS DESCRIPTION                 Specimen A is received in formalin for routine pathology in a container  designated \"Mynaugh, ileal erosions.\" The specimen consists of multiple  brown-tan mucosal covered fragments measuring 0.6 x 0.3 x 0.1 cm aggregate and  ranging from 0.1-0.5 cm in greatest individual dimension. The specimen is  submitted in a single cassette.     Specimen B is received in formalin for routine pathology in a container  designated \"Mynaugh, rectal polyp.\" The specimen consists of tan soft tissue and                  GROSS DESCRIPTION           (Continued)     fecal material measuring 0.7 x 0.3 x 0.1 cm aggregate. The specimen is submitted  in a single cassette.  (ka)     David Kohli M.D./brendan           Signed:   David Kohli MD                  11/24/14 (Electronic)  --------------------------------------------------------------------------------                                               RENETTA BRAY M.D., MEDICAL

## 2025-03-03 ENCOUNTER — HOSPITAL ENCOUNTER (OUTPATIENT)
Dept: MAMMOGRAPHY | Age: 70
Discharge: HOME | End: 2025-03-03
Attending: INTERNAL MEDICINE
Payer: MEDICARE

## 2025-03-03 DIAGNOSIS — Z12.31 ENCOUNTER FOR SCREENING MAMMOGRAM FOR MALIGNANT NEOPLASM OF BREAST: ICD-10-CM

## 2025-03-03 PROCEDURE — 77067 SCR MAMMO BI INCL CAD: CPT | Performed by: INTERNAL MEDICINE

## 2025-03-03 PROCEDURE — 77063 BREAST TOMOSYNTHESIS BI: CPT | Performed by: INTERNAL MEDICINE

## 2025-03-21 DIAGNOSIS — E11.00 TYPE 2 DIABETES MELLITUS WITH HYPEROSMOLARITY WITHOUT COMA, WITHOUT LONG-TERM CURRENT USE OF INSULIN (HCC): ICD-10-CM

## 2025-03-25 NOTE — TELEPHONE ENCOUNTER
Refill Per Protocol     Requested Prescriptions   Pending Prescriptions Disp Refills    METFORMIN HCL 1000 MG Oral Tab [Pharmacy Med Name: Metformin Hydrochloride 1,000 Mg Tab Luis] 180 tablet 0     Sig: TAKE ONE TABLET BY MOUTH TWICE DAILY       Diabetes Medication Protocol Passed - 3/25/2025  9:56 AM        Passed - Last A1C < 7.5 and within past 6 months     Lab Results   Component Value Date    A1C 7.3 (H) 12/31/2024             Passed - In person appointment or virtual visit in the past 6 mos or appointment in next 3 mos     Recent Outpatient Visits              2 months ago Medicare annual wellness [facility]    Office Visit    1 year ago Encounter for annual health examination    [facility]    Office Visit    1 year ago Acute cough    [facility]    Office Visit    2 years ago Inflamed seborrheic keratosis    [facility] [place] Bethanie Jiang MD    Office Visit    2 years ago Annual physical exam    [facility]    [facility] Visit          Future Appointments         Provider Department Appt Notes    [facility] 3 [facility]    In 3 months Mary Thomson MD [facility], [place], [place] follow up from 12/31/2024 visit regarding diabetes                    Passed - Microalbumin procedure in past 12 months or taking ACE/ARB        Passed - EGFRCR or GFRNAA > 50     GFR Evaluation  EGFRCR: 71 , resulted on 12/31/2024          Passed - GFR in the past 12 months        Passed - Medication is active on med list

## 2025-03-26 NOTE — TELEPHONE ENCOUNTER
Please review. Protocol Pass    Original rx written 90 with no refills.  Rx is pended, is refill appropriate?

## 2025-03-28 ENCOUNTER — TELEPHONE (OUTPATIENT)
Facility: CLINIC | Age: 70
End: 2025-03-28

## 2025-03-28 DIAGNOSIS — E11.00 TYPE 2 DIABETES MELLITUS WITH HYPEROSMOLARITY WITHOUT COMA, WITHOUT LONG-TERM CURRENT USE OF INSULIN (HCC): ICD-10-CM

## 2025-03-28 NOTE — TELEPHONE ENCOUNTER
Iván/tech with [employer] called states the the insulin use statement for coverage with insurance--> resent Rx as written to pharmacy as requested with statement--> Receipt confirmed by pharmacy (3/28/2025 10:40 AM CDT). He received the Rx sent. He  verbalized understanding. No further questions or concerns at this time.

## 2025-04-14 ENCOUNTER — IMMUNIZATION (OUTPATIENT)
Dept: LAB | Age: 70
End: 2025-04-14
Attending: EMERGENCY MEDICINE
Payer: MEDICARE

## 2025-04-14 DIAGNOSIS — Z23 NEED FOR VACCINATION: Primary | ICD-10-CM

## 2025-04-14 PROCEDURE — 90480 ADMN SARSCOV2 VAC 1/ONLY CMP: CPT

## 2025-06-23 ENCOUNTER — TELEPHONE (OUTPATIENT)
Facility: CLINIC | Age: 70
End: 2025-06-23

## 2025-06-23 NOTE — TELEPHONE ENCOUNTER
Called and spoke to the patient, date of birth and name verified.    Informed that she may proceed with colonoscopy tomorrow per MD.    She was appreciative of the call.

## 2025-06-23 NOTE — TELEPHONE ENCOUNTER
I spoke to Dr Merrill.    The patient may proceed with colonoscopy tomorrow in spite taking Januvia, calcium and Vit D3.

## 2025-06-23 NOTE — TELEPHONE ENCOUNTER
Dr Merrill    Called and spoke to the patient, date of birth and name verified.    She is scheduled for colonoscopy tomorrow (6/24/2025).    Can the patient proceed, she has been taking calcium, Vit D3 everyday and also took Januvia this morning.    For the oral DM2 like the januvia/sitagliptin we ask the patient to hold the day before and day of if no orders from the provider.    Per your order on 1/22/2025 from TE 1/22/2025, the patient needs to hold for 4 days.    Thank you

## 2025-06-24 ENCOUNTER — ANESTHESIA EVENT (OUTPATIENT)
Dept: ENDOSCOPY | Facility: HOSPITAL | Age: 70
End: 2025-06-24
Payer: MEDICARE

## 2025-06-24 ENCOUNTER — HOSPITAL ENCOUNTER (OUTPATIENT)
Facility: HOSPITAL | Age: 70
Setting detail: HOSPITAL OUTPATIENT SURGERY
Discharge: HOME | End: 2025-06-24
Attending: INTERNAL MEDICINE | Admitting: INTERNAL MEDICINE
Payer: MEDICARE

## 2025-06-24 ENCOUNTER — ANESTHESIA (OUTPATIENT)
Dept: ENDOSCOPY | Facility: HOSPITAL | Age: 70
End: 2025-06-24
Payer: MEDICARE

## 2025-06-24 DIAGNOSIS — Z12.11 SPECIAL SCREENING FOR MALIGNANT NEOPLASMS, COLON: ICD-10-CM

## 2025-06-24 PROBLEM — Z12.12 SCREENING FOR COLORECTAL CANCER: Status: ACTIVE | Noted: 2025-06-24

## 2025-06-24 LAB — GLUCOSE BLDC GLUCOMTR-MCNC: 117 MG/DL (ref 70–99)

## 2025-06-24 PROCEDURE — 45385 COLONOSCOPY W/LESION REMOVAL: CPT | Performed by: INTERNAL MEDICINE

## 2025-06-24 RX ORDER — LIDOCAINE HYDROCHLORIDE 20 MG/ML
INJECTION, SOLUTION EPIDURAL; INFILTRATION; INTRACAUDAL; PERINEURAL AS NEEDED
Status: DISCONTINUED | OUTPATIENT
Start: 2025-06-24 | End: 2025-06-24 | Stop reason: SURG

## 2025-06-24 RX ORDER — NALOXONE HYDROCHLORIDE 0.4 MG/ML
0.08 INJECTION, SOLUTION INTRAMUSCULAR; INTRAVENOUS; SUBCUTANEOUS ONCE AS NEEDED
Status: DISCONTINUED | OUTPATIENT
Start: 2025-06-24 | End: 2025-06-24

## 2025-06-24 RX ORDER — SODIUM CHLORIDE, SODIUM LACTATE, POTASSIUM CHLORIDE, CALCIUM CHLORIDE 600; 310; 30; 20 MG/100ML; MG/100ML; MG/100ML; MG/100ML
INJECTION, SOLUTION INTRAVENOUS CONTINUOUS
Status: DISCONTINUED | OUTPATIENT
Start: 2025-06-24 | End: 2025-06-24

## 2025-06-24 RX ADMIN — LIDOCAINE HYDROCHLORIDE 40 MG: 20 INJECTION, SOLUTION EPIDURAL; INFILTRATION; INTRACAUDAL; PERINEURAL at 09:25:00

## 2025-06-24 NOTE — DISCHARGE INSTRUCTIONS
Home Care Instructions for Colonoscopy with Sedation    Diet:  - Resume your regular diet as tolerated unless otherwise instructed.  - Start with light meals to minimize bloating.  - Do not drink alcohol today.    Medication:  - If you have questions about resuming your normal medications, please contact your Primary Care Physician.    Activities:  - Take it easy today. Do not return to work today.  - Do not drive today.  - Do not operate any machinery today (including kitchen equipment).    Colonoscopy:  - You may notice some rectal \"spotting\" (a little blood on the toilet tissue) for a day or two after the exam. This is normal.  - If you experience any rectal bleeding (not spotting), persistent tenderness or sharp severe abdominal pains, oral temperature over 100 degrees Fahrenheit, light-headedness or dizziness, or any other problems, contact your doctor.    **If unable to reach your doctor, please go to the [facility] Emergency Room**    - Your referring physician will receive a full report of your examination.  - If you do not hear from your doctor's office within two weeks of your biopsy, please call them for your results.    You may be able to see your laboratory results in [employer] between 4 and 7 business days.  In some cases, your physician may not have viewed the results before they are released to [employer].  If you have questions regarding your results contact the physician who ordered the test/exam by phone or via [employer] by choosing \"Ask a Medical Question.\"

## 2025-06-24 NOTE — H&P
History & Physical Examination    Patient Name: Chantelle Al  MRN: [id number]  [facility]: [id number]  YOB: 1955    Diagnosis: Colorectal cancer screening      Prescriptions Prior to Admission[1]  Current Hospital Medications[2]    Allergies: Allergies[3]    Past Medical History[4]  Past Surgical History[5]  Family History[6]  Social History     Tobacco Use    Smoking status: Never    Smokeless tobacco: Never   Substance Use Topics    Alcohol use: Not Currently     Comment: 1-2 times per year       SYSTEM Check if Review is Normal Check if Physical Exam is Normal If not normal, please explain:   HEENT [X ] [ X]    NECK  [X ] [ X]    HEART [X ] [ X]    LUNGS [X ] [ X]    ABDOMEN [X ] [ X]    EXTREMITIES [X ] [ X]    OTHER        [ x ] I have discussed the risks and benefits and alternatives with the patient/family.  They understand and agree to proceed with plan of care.  [ x ] I have reviewed the History and Physical done within the last 30 days.  Any changes noted above.    Garfield Velasco MD  6/24/2025  9:07 AM             [1]   Medications Prior to Admission   Medication Sig Dispense Refill Last Dose/Taking    SITagliptin Phosphate (JANUVIA) 100 MG Oral Tab Take 1 tablet (100 mg total) by mouth daily. 90 tablet 3 Taking    metFORMIN HCl 1000 MG Oral Tab Take 1 tablet (1,000 mg total) by mouth 2 (two) times daily. 180 tablet 3 Taking    Cholecalciferol (VITAMIN D3) 10 MCG (400 UNIT) Oral Cap Take 1 capsule by mouth in the morning.   Taking    Calcium Carbonate 600 MG Oral Tab Take 1 tablet (600 mg total) by mouth in the morning.   Taking    pantoprazole 40 MG Oral Tab EC Take 1 tablet (40 mg total) by mouth before breakfast. 90 tablet 3 Taking    atorvastatin 20 MG Oral Tab Take 1 tablet (20 mg total) by mouth nightly. 90 tablet 3 Taking    Benazepril-hydroCHLOROthiazide 10-12.5 MG Oral Tab Take 1 tablet by mouth daily. 90 tablet 3 Taking    Glucose Blood (CONTOUR NEXT TEST) In Vitro Strip Use to  test blood glucose once daily for type 2 diabetes mellitus without complication, without long-term current use of insulin 100 strip 3     Na Sulfate-K Sulfate-Mg Sulf (SUPREP BOWEL PREP KIT) 17.5-3.13-1.6 GM/177ML Oral Solution Take as directed (Patient not taking: Reported on 2025) 1 each 0 Not Taking    amoxicillin 500 MG Oral Cap Take 4 pills 1 hour prior to procedure (Patient not taking: Reported on 2025) 8 capsule 1 Not Taking    Microlet Lancets Does not apply Misc Use to test blood glucose once daily 100 each 3    [2]   Current Facility-Administered Medications   Medication Dose Route Frequency    lactated ringers infusion   Intravenous Continuous   [3]   Allergies  Allergen Reactions    Pollen ITCHING   [4]   Past Medical History:   Allergic rhinitis    general to pollen    Annual physical exam    Arthritis    Blepharitis    per NG Side-OU    Conjunctivitis    per NG Side-OU    Depression    Dermatitis    Diabetes (HCC)    Diabetes mellitus (HCC)    Diarrhea of presumed infectious origin    Diverticular disease    Diverticulitis    Diverticulosis of large intestine    Esophageal reflux    Family history of malignant neoplasm of breast    GERD (gastroesophageal reflux disease)    High blood pressure    High cholesterol    History of diverticulitis of colon    History of total knee arthroplasty    Iron deficiency anemia    Obesity    Osteoarthritis    Other and unspecified hyperlipidemia    Primary osteoarthritis of left knee    Primary osteoarthritis of right knee    S/P total knee arthroplasty, right    Sleep apnea    cpap sometimes    Tinnitus    Total knee replacement status, right    Unspecified essential hypertension    Visual impairment    WEARS GLASSES   [5]   Past Surgical History:  Procedure Laterality Date    Bso, omentectomy w/dolly      1 ovary left      1977      1982      1985    Colonoscopy  2014    D & c      1981    Hysterectomy   05/01/1996    Right ovary was left    Knee replacement surgery  7/10/2017, 11/13/2017    Other surgical history  D & C in 1981    incomplete miscarriage    Tonsillectomy      When patient was 3 yrs old    Total knee replacement Left 07/10/2017    Fracisco    Total knee replacement Right 11/13/2017    DR. CORTEZ   [6]   Family History  Problem Relation Age of Onset    Diabetes Father     Heart Disorder Father     Diabetes Mother     Breast Cancer Mother 47        2nd primary @ 67, pos braca    Cancer Mother     Hypertension Mother     Cancer Maternal Aunt 71        pancreatic cancer; d.74    Breast Cancer Maternal Aunt 40        d.53    Cancer Maternal Grandfather 73        prostate ca; d.76    Cancer Paternal Grandfather 59        colon ca    Breast Cancer Maternal Aunt 70    Breast Cancer Maternal Cousin Female 30    Cancer Maternal Cousin Female 52        thyroid ca    Dementia Maternal Grandmother     Macular degeneration Neg     Glaucoma Neg     Ovarian Cancer Neg

## 2025-06-24 NOTE — OPERATIVE REPORT
[facility] Endoscopy Report      Date of Procedure:  06/24/25      Preoperative Diagnosis:  Colorectal cancer screening      Postoperative Diagnosis:  1.  Colon polyps  2.  Diverticulosis left colon       Procedure:    Colonoscopy with polypectomy and biopsy      Surgeon:  Garfield Velasco M.D.      Anesthesia:  Monitored anesthesia care  Cecal withdrawal time: 24 minutes  EBL:  Insignificant      Brief History:  This is a 70 year old female who presents for a screening colonoscopy.  Her last examination was 10 years prior.  Other than chronically loose stools the patient has been asymptomatic from a lower gastrointestinal tract standpoint.      Technique:  After informed consent, the patient was placed in the left lateral recumbent position.  Digital rectal examination revealed no palpable intraluminal abnormalities.  An Olympus variable stiffness 190 series HD colonoscope was inserted into the rectum and advanced under direct vision by following the lumen to the terminal ileum.  The colon was examined upon withdrawal in the left lateral recumbent position.      Findings:  The preparation of the colon was very good.  The terminal ileum was examined for 5 cm and visually normal.  No erosions were seen.  The ileocecal valve was well preserved. The visualized colonic mucosa from the cecum to the anal verge was normal with an intact vascular pattern.  There were #4 polyps seen within the colon which were removed as follows:    1.  In the cecum there was a 4-5 mm sessile serrated polyp which was cold snare excised and retrieved.  2.  In the sigmoid colon there was a 4-5 mm sessile polyp which was cold snare excised and retrieved.  3.  In the rectum there were #2 3-5 mm sessile polyps which were cold snare excised and retrieved.    Inspection of all sites revealed no evidence of ongoing bleeding.  There were scattered diverticula seen in at least the left colon with a few everted diverticula.  There  were no other colonic polyps, mass lesions, vascular anomalies or signs of inflammation seen.  Random biopsies were taken upon withdrawal to exclude a microscopic colitis.  Retroflexion in the rectum revealed no abnormalities.  The procedure was well tolerated without immediate complication.      Impression:  1.  Diminutive/small colon polyps  2.  Uncomplicated diverticulosis left colon    Recommendations:  1.  High-fiber diet.  2.  Follow-up biopsy results.  Polyp histology to determine recommendations regarding follow-up.        Garfield Velasco MD  6/24/2025

## 2025-06-24 NOTE — ANESTHESIA PREPROCEDURE EVALUATION
Anesthesia PreOp Note    HPI:     Chantelle Al is a 70 year old female who presents for preoperative consultation requested by: Garfield Velasco MD    Date of Surgery: 6/24/2025    Procedure(s):  COLONOSCOPY  Indication: Special screening for malignant neoplasms, colon    Relevant Problems   No relevant active problems       NPO:  Last Liquid Consumption Date: 06/24/25  Last Liquid Consumption Time: 0630  Last Solid Consumption Date: 06/23/25  Last Solid Consumption Time: 0800  Last Liquid Consumption Date: 06/24/25          History Review:  Patient Active Problem List    Diagnosis Date Noted    Obesity, morbid (HCC) 11/17/2023    Lichen planopilaris 02/02/2021    Scarring alopecia 01/25/2021    JOHN on CPAP     HTN (hypertension) 02/17/2017    Dyslipidemia 02/17/2017    Type 2 diabetes mellitus without complication, without long-term current use of insulin (HCC) 02/17/2017    Senile cataract 07/10/2013       Past Medical History[1]    Past Surgical History[2]    Prescriptions Prior to Admission[3]  Current Medications and Prescriptions Ordered in Epic[4]    Allergies[5]    Family History[6]  Social Hx on file[7]    Available pre-op labs reviewed.             Vital Signs:  Body mass index is 46.77 kg/m².   height is 1.6 m (5' 3\") and weight is 119.7 kg (264 lb).   Vitals:    06/18/25 1422   Weight: 119.7 kg (264 lb)   Height: 1.6 m (5' 3\")        Anesthesia Evaluation     Patient summary reviewed    No history of anesthetic complications   Airway   Mallampati: II  TM distance: >3 FB  Neck ROM: full  Dental - Dentition appears grossly intact     Pulmonary    (+) sleep apnea on CPAP, decreased breath sounds  Cardiovascular - normal exam  (+) hypertension    Neuro/Psych    (+)   depression      GI/Hepatic/Renal    (+) GERD, bowel prep    Endo/Other    (+) diabetes mellitus type 2 well controlled  Abdominal   (+) obese                 Anesthesia Plan:   ASA:  3  Plan:   MAC  Informed Consent Plan and Risks  Discussed With:  Patient      I have informed Chantelle Al and/or legal guardian or family member of the nature of the anesthetic plan, benefits, risks including possible dental damage if relevant, major complications, and any alternative forms of anesthetic management.   All of the patient's questions were answered to the best of my ability. The patient desires the anesthetic management as planned.  Peewee DRAKE Joanna, CRNA  2025 9:11 AM  Present on Admission:  **None**           [1]   Past Medical History:   Allergic rhinitis    general to pollen    Annual physical exam    Arthritis    Blepharitis    per NG Side-OU    Conjunctivitis    per NG Side-OU    Depression    Dermatitis    Diabetes (HCC)    Diabetes mellitus (HCC)    Diarrhea of presumed infectious origin    Diverticular disease    Diverticulitis    Diverticulosis of large intestine    Esophageal reflux    Family history of malignant neoplasm of breast    GERD (gastroesophageal reflux disease)    High blood pressure    High cholesterol    History of diverticulitis of colon    History of total knee arthroplasty    Iron deficiency anemia    Obesity    Osteoarthritis    Other and unspecified hyperlipidemia    Primary osteoarthritis of left knee    Primary osteoarthritis of right knee    S/P total knee arthroplasty, right    Sleep apnea    cpap sometimes    Tinnitus    Total knee replacement status, right    Unspecified essential hypertension    Visual impairment    WEARS GLASSES   [2]   Past Surgical History:  Procedure Laterality Date    Bso, omentectomy w/dolly      1 ovary left      1977      1982      1985    Colonoscopy  2014    D & c          Hysterectomy  1996    Right ovary was left    Knee replacement surgery  7/10/2017, 2017    Other surgical history  D & C in     incomplete miscarriage    Tonsillectomy      When patient was 3 yrs old    Total knee replacement Left  07/10/2017    Fracisco    Total knee replacement Right 11/13/2017    DR. CORTEZ   [3]   Medications Prior to Admission   Medication Sig Dispense Refill Last Dose/Taking    SITagliptin Phosphate (JANUVIA) 100 MG Oral Tab Take 1 tablet (100 mg total) by mouth daily. 90 tablet 3 Taking    metFORMIN HCl 1000 MG Oral Tab Take 1 tablet (1,000 mg total) by mouth 2 (two) times daily. 180 tablet 3 Taking    Cholecalciferol (VITAMIN D3) 10 MCG (400 UNIT) Oral Cap Take 1 capsule by mouth in the morning.   Taking    Calcium Carbonate 600 MG Oral Tab Take 1 tablet (600 mg total) by mouth in the morning.   Taking    pantoprazole 40 MG Oral Tab EC Take 1 tablet (40 mg total) by mouth before breakfast. 90 tablet 3 Taking    atorvastatin 20 MG Oral Tab Take 1 tablet (20 mg total) by mouth nightly. 90 tablet 3 Taking    Benazepril-hydroCHLOROthiazide 10-12.5 MG Oral Tab Take 1 tablet by mouth daily. 90 tablet 3 Taking    Glucose Blood (CONTOUR NEXT TEST) In Vitro Strip Use to test blood glucose once daily for type 2 diabetes mellitus without complication, without long-term current use of insulin 100 strip 3     Na Sulfate-K Sulfate-Mg Sulf (SUPREP BOWEL PREP KIT) 17.5-3.13-1.6 GM/177ML Oral Solution Take as directed (Patient not taking: Reported on 6/18/2025) 1 each 0 Not Taking    amoxicillin 500 MG Oral Cap Take 4 pills 1 hour prior to procedure (Patient not taking: Reported on 6/18/2025) 8 capsule 1 Not Taking    Microlet Lancets Does not apply Misc Use to test blood glucose once daily 100 each 3    [4]   Current Facility-Administered Medications Ordered in Epic   Medication Dose Route Frequency Provider Last Rate Last Admin    lactated ringers infusion   Intravenous Continuous Garfield Velasco MD         No current [facility]-ordered outpatient medications on file.   [5]   Allergies  Allergen Reactions    Pollen ITCHING   [6]   Family History  Problem Relation Age of Onset    Diabetes Father     Heart Disorder Father     Diabetes  Mother     Breast Cancer Mother 47        2nd primary @ 67, pos braca    Cancer Mother     Hypertension Mother     Cancer Maternal Aunt 71        pancreatic cancer; d.74    Breast Cancer Maternal Aunt 40        d.53    Cancer Maternal Grandfather 73        prostate ca; d.76    Cancer Paternal Grandfather 59        colon ca    Breast Cancer Maternal Aunt 70    Breast Cancer Maternal Cousin Female 30    Cancer Maternal Cousin Female 52        thyroid ca    Dementia Maternal Grandmother     Macular degeneration Neg     Glaucoma Neg     Ovarian Cancer Neg    [7]   Social History  Socioeconomic History    Marital status:    Tobacco Use    Smoking status: Never    Smokeless tobacco: Never   Vaping Use    Vaping status: Never Used   Substance and Sexual Activity    Alcohol use: Not Currently     Comment: 1-2 times per year    Drug use: No   Other Topics Concern    Caffeine Concern Yes     Comment: rarely    Reaction to local anesthetic No

## 2025-06-25 ENCOUNTER — TELEPHONE (OUTPATIENT)
Facility: CLINIC | Age: 70
End: 2025-06-25

## 2025-06-25 VITALS
SYSTOLIC BLOOD PRESSURE: 113 MMHG | RESPIRATION RATE: 20 BRPM | HEIGHT: 63 IN | BODY MASS INDEX: 46.78 KG/M2 | WEIGHT: 264 LBS | DIASTOLIC BLOOD PRESSURE: 68 MMHG | HEART RATE: 72 BPM | OXYGEN SATURATION: 97 %

## 2025-06-25 NOTE — TELEPHONE ENCOUNTER
Recall colonoscopy in 3 years per Dr. Velasco.     Last done 6/24/2025.     Recall entered into patient outreach for 6/24/2028.     Health maintenance updated.

## 2025-06-25 NOTE — TELEPHONE ENCOUNTER
----- Message from Garfield Velasco sent at 6/24/2025  5:39 PM CDT -----  I spoke to Chantelle.  She is feeling well.  She had #3 adenomatous polyps removed (#2 SSAs and a TA).  Uncomplicated diverticulosis was present.  Random biopsies of the colon were negative for   microscopic colitis.  I recommend a high-fiber diet for diverticulosis and a surveillance colonoscopy in 3 years.    GI RNs: Please enter colonoscopy recall for 3 years.  ----- Message -----  From: Lab, Background User  Sent: 6/24/2025   5:25 PM CDT  To: Garfield Velasco MD

## 2025-07-23 ENCOUNTER — TELEPHONE (OUTPATIENT)
Dept: INTERNAL MEDICINE CLINIC | Facility: CLINIC | Age: 70
End: 2025-07-23

## 2025-07-23 DIAGNOSIS — E11.9 TYPE 2 DIABETES MELLITUS WITHOUT COMPLICATION, WITHOUT LONG-TERM CURRENT USE OF INSULIN (HCC): Primary | ICD-10-CM

## 2025-07-23 NOTE — TELEPHONE ENCOUNTER
Called the patient to discuss her care gaps, but had to leave a message.  I advised her that I was going to make sure the orders are in for her labs and that she needs to make an appointment for her diabetic foot care.  Further, I asked her to call me to let me know if she has had her diabetic eye exam done and if so by whom and where.  Left a return number for her to call.  Pended orders for the patient.

## 2025-08-08 ENCOUNTER — LAB ENCOUNTER (OUTPATIENT)
Dept: LAB | Facility: REFERENCE LAB | Age: 70
End: 2025-08-08
Attending: INTERNAL MEDICINE

## 2025-08-08 ENCOUNTER — OFFICE VISIT (OUTPATIENT)
Dept: INTERNAL MEDICINE CLINIC | Facility: CLINIC | Age: 70
End: 2025-08-08

## 2025-08-08 VITALS
SYSTOLIC BLOOD PRESSURE: 135 MMHG | TEMPERATURE: 98 F | BODY MASS INDEX: 46.42 KG/M2 | HEIGHT: 63 IN | RESPIRATION RATE: 16 BRPM | WEIGHT: 262 LBS | HEART RATE: 68 BPM | DIASTOLIC BLOOD PRESSURE: 75 MMHG | OXYGEN SATURATION: 96 %

## 2025-08-08 DIAGNOSIS — Z78.0 MENOPAUSE: ICD-10-CM

## 2025-08-08 DIAGNOSIS — E11.9 TYPE 2 DIABETES MELLITUS WITHOUT COMPLICATION, WITHOUT LONG-TERM CURRENT USE OF INSULIN (HCC): Primary | ICD-10-CM

## 2025-08-08 DIAGNOSIS — E11.9 TYPE 2 DIABETES MELLITUS WITHOUT COMPLICATION, WITHOUT LONG-TERM CURRENT USE OF INSULIN (HCC): ICD-10-CM

## 2025-08-08 LAB
ANION GAP SERPL CALC-SCNC: 10 MMOL/L (ref 0–18)
BUN BLD-MCNC: 13 MG/DL (ref 9–23)
BUN/CREAT SERPL: 13.1 (ref 10–20)
CALCIUM BLD-MCNC: 10.4 MG/DL (ref 8.7–10.4)
CHLORIDE SERPL-SCNC: 101 MMOL/L (ref 98–112)
CO2 SERPL-SCNC: 29 MMOL/L (ref 21–32)
CREAT BLD-MCNC: 0.99 MG/DL (ref 0.55–1.02)
CREAT UR-SCNC: 123 MG/DL
EGFRCR SERPLBLD CKD-EPI 2021: 61 ML/MIN/1.73M2 (ref 60–?)
EST. AVERAGE GLUCOSE BLD GHB EST-MCNC: 154 MG/DL (ref 68–126)
FASTING STATUS PATIENT QL REPORTED: NO
GLUCOSE BLD-MCNC: 120 MG/DL (ref 70–99)
HBA1C MFR BLD: 7 % (ref ?–5.7)
MICROALBUMIN UR-MCNC: <0.3 MG/DL
OSMOLALITY SERPL CALC.SUM OF ELEC: 291 MOSM/KG (ref 275–295)
POTASSIUM SERPL-SCNC: 4 MMOL/L (ref 3.5–5.1)
SODIUM SERPL-SCNC: 140 MMOL/L (ref 136–145)

## 2025-08-08 PROCEDURE — 36415 COLL VENOUS BLD VENIPUNCTURE: CPT

## 2025-08-08 PROCEDURE — 82570 ASSAY OF URINE CREATININE: CPT | Performed by: INTERNAL MEDICINE

## 2025-08-08 PROCEDURE — 82043 UR ALBUMIN QUANTITATIVE: CPT | Performed by: INTERNAL MEDICINE

## 2025-08-08 PROCEDURE — 99214 OFFICE O/P EST MOD 30 MIN: CPT | Performed by: INTERNAL MEDICINE

## 2025-08-08 PROCEDURE — 83036 HEMOGLOBIN GLYCOSYLATED A1C: CPT

## 2025-08-08 PROCEDURE — 80048 BASIC METABOLIC PNL TOTAL CA: CPT

## (undated) DEVICE — DUAL CUT SAGITTAL BLADE

## (undated) DEVICE — T5 HOOD WITH PEEL AWAY FACE SHIELD

## (undated) DEVICE — BLADE SCPL 10 SHAW KNF PRCS

## (undated) DEVICE — SURETRANS AUTOTRANSFUSION SYSTEM FOR ORTHOPAEDICS WITH PVC DRAIN AND 2 TROCARS: Brand: SURETRANS AUTOTRANSFUSION SYSTEM FOR ORTHOPAEDICS

## (undated) DEVICE — SOL  .9 3000ML

## (undated) DEVICE — TOTAL KNEE: Brand: MEDLINE INDUSTRIES, INC.

## (undated) DEVICE — SUTURE SILK 0 FSL

## (undated) DEVICE — GAUZE SPONGES,12 PLY: Brand: CURITY

## (undated) DEVICE — BOWL CEMENT MIX QUICK-VAC

## (undated) DEVICE — VIOLET BRAIDED (POLYGLACTIN 910), SYNTHETIC ABSORBABLE SUTURE: Brand: COATED VICRYL

## (undated) DEVICE — Device

## (undated) DEVICE — SPONGE LAP 18X18 XRAY STRL

## (undated) DEVICE — MEDI-VAC NON-CONDUCTIVE SUCTION TUBING 6MM X 1.8M (6FT.) L: Brand: CARDINAL HEALTH

## (undated) DEVICE — PRECISION THIN (27.0 X 0.38MM)

## (undated) DEVICE — COVER SGL STRL LGHT HNDL BLU

## (undated) DEVICE — GIJAW SINGLE-USE BIOPSY FORCEPS WITH NEEDLE: Brand: GIJAW

## (undated) DEVICE — FAN SPRAY KIT: Brand: PULSAVAC®

## (undated) DEVICE — SOL  .9 1000ML BTL

## (undated) DEVICE — STERILE LATEX POWDER-FREE SURGICAL GLOVESWITH NITRILE COATING: Brand: PROTEXIS

## (undated) DEVICE — Device: Brand: STABLECUT®

## (undated) DEVICE — ZIMMER® STERILE DISPOSABLE TOURNIQUET CUFF WITH PLC, DUAL PORT, SINGLE BLADDER, 34 IN. (86 CM)

## (undated) DEVICE — V2 SPECIMEN COLLECTION MANIFOLD KIT: Brand: NEPTUNE

## (undated) DEVICE — KIT CLEAN ENDOKIT 1.1OZ GOWNX2

## (undated) DEVICE — PETROLATUM GAUZE CISION DRESSING: Brand: VASELINE

## (undated) DEVICE — BLADE SAW SAGITTAL 19.5

## (undated) DEVICE — BANDAGE FLXMSTR 11YDX6IN STRL

## (undated) DEVICE — COTTON ROLL: Brand: DEROYAL

## (undated) DEVICE — KIT ENDO ORCAPOD 160/180/190

## (undated) DEVICE — SUTURE ETHIBOND 1 CT-1

## (undated) DEVICE — SUTURE VICRYL 0 J340H

## (undated) DEVICE — BATTERY

## (undated) DEVICE — SUTURE ETHILON 3-0 669H

## (undated) DEVICE — Device: Brand: POWER-FLO®

## (undated) DEVICE — ZIMMER® STERILE DISPOSABLE TOURNIQUET CUFF WITH PLC, DUAL PORT, SINGLE BLADDER, 42 IN. (107 CM)

## (undated) DEVICE — SOLUTION SURG DURA PREP HAZMAT

## (undated) DEVICE — LASSO POLYPECTOMY SNARE: Brand: LASSO

## (undated) DEVICE — V2 SPECIMEN COLLECTION TRAY: Brand: NEPTUNE

## (undated) DEVICE — GOWN SURG AERO BLUE PERF LG

## (undated) DEVICE — IMPERVIOUS STOCKINETTE: Brand: DEROYAL

## (undated) DEVICE — TRAY SRGPRP PVP IOD WT SCRB SM

## (undated) DEVICE — 60 ML SYRINGE REGULAR TIP: Brand: MONOJECT

## (undated) NOTE — LETTER
[place] [address], [place], [place]    Authorization for Surgical Operation and Procedure                               I hereby authorize Garfield Velasco MD, my physician and his/her assistants (if applicable), which may include medical students, residents, and/or fellows, to perform the following surgical operation/ procedure and administer such anesthesia as may be determined necessary by my physician: Operation/Procedure name (s) COLONOSCOPY on Chantelle Cardenasnarohit   2.   I recognize that during the surgical operation/procedure, unforeseen conditions may necessitate additional or different procedures than those listed above.  I, therefore, further authorize and request that the above-named surgeon, assistants, or designees perform such procedures as are, in their judgment, necessary and desirable.    3.   My surgeon/physician has discussed prior to my surgery the potential benefits, risks and side effects of this procedure; the likelihood of achieving goals; and potential problems that might occur during recuperation.  They also discussed reasonable alternatives to the procedure, including risks, benefits, and side effects related to the alternatives and risks related to not receiving this procedure.  I have had all my questions answered and I acknowledge that no guarantee has been made as to the result that may be obtained.    4.   Should the need arise during my operation/procedure, which includes change of level of care prior to discharge, I also consent to the administration of blood and/or blood products.  Further, I understand that despite careful testing and screening of blood or blood products by collecting agencies, I may still be subject to ill effects as a result of receiving a blood transfusion and/or blood products.  The following are some, but not all, of the potential risks that can occur: fever and allergic reactions, hemolytic reactions, transmission of diseases  such as Hepatitis, AIDS and Cytomegalovirus (CMV) and fluid overload.  In the event that I wish to have an autologous transfusion of my own blood, or a directed donor transfusion, I will discuss this with my physician.  Check only if Refusing Blood or Blood Products  I understand refusal of blood or blood products as deemed necessary by my physician may have serious consequences to my condition to include possible death. I hereby assume responsibility for my refusal and release the hospital, its personnel, and my physicians from any responsibility for the consequences of my refusal.    o  Refuse   5.   I authorize the use of any specimen, organs, tissues, body parts or foreign objects that may be removed from my body during the operation/procedure for diagnosis, research or teaching purposes and their subsequent disposal by hospital authorities.  I also authorize the release of specimen test results and/or written reports to my treating physician on the hospital medical staff or other referring or consulting physicians involved in my care, at the discretion of the Pathologist or my treating physician.    6.   I consent to the photographing or videotaping of the operations or procedures to be performed, including appropriate portions of my body for medical, scientific, or educational purposes, provided my identity is not revealed by the pictures or by descriptive texts accompanying them.  If the procedure has been photographed/videotaped, the surgeon will obtain the original picture, image, videotape or CD.  The hospital will not be responsible for storage, release or maintenance of the picture, image, tape or CD.    7.   I consent to the presence of a  or observers in the operating room as deemed necessary by my physician or their designees.    8.   I recognize that in the event my procedure results in extended X-Ray/fluoroscopy time, I may develop a skin reaction.    9. If I have a Do Not Attempt  Resuscitation (DNAR) order in place, that status will be suspended while in the operating room, procedural suite, and during the recovery period unless otherwise explicitly stated by me (or a person authorized to consent on my behalf). The surgeon or my attending physician will determine when the applicable recovery period ends for purposes of reinstating the DNAR order.  10. Patients having a sterilization procedure: I understand that if the procedure is successful the results will be permanent and it will therefore be impossible for me to inseminate, conceive, or bear children.  I also understand that the procedure is intended to result in sterility, although the result has not been guaranteed.   11. I acknowledge that my physician has explained sedation/analgesia administration to me including the risk and benefits I consent to the administration of sedation/analgesia as may be necessary or desirable in the judgment of my physician.    I CERTIFY THAT I HAVE READ AND FULLY UNDERSTAND THE ABOVE CONSENT TO OPERATION and/or OTHER PROCEDURE.     ____________________________________  _________________________________        ______________________________  Signature of Patient    Signature of Responsible Person                Printed Name of Responsible Person                                      ____________________________________  _____________________________                ________________________________  Signature of Witness        Date  Time         Relationship to Patient    STATEMENT OF PHYSICIAN My signature below affirms that prior to the time of the procedure; I have explained to the patient and/or his/her legal representative, the risks and benefits involved in the proposed treatment and any reasonable alternative to the proposed treatment. I have also explained the risks and benefits involved in refusal of the proposed treatment and alternatives to the proposed treatment and have answered the patient's  questions. If I have a significant financial interest in a co-management agreement or a significant financial interest in any product or implant, or other significant relationship used in this procedure/surgery, I have disclosed this and had a discussion with my patient.     _____________________________________________________              _____________________________  (Signature of Physician)                                                                                         (Date)                                   (Time)  Patient Name: Chantelle Al      : 3/5/1955      Printed: 2025     Medical Record #: [id number]                                      Page 1 of 1

## (undated) NOTE — MR AVS SNAPSHOT
[address] [facility] [phone]               Thank you for choosing us for your health care visit with [address]   We are glad to serve you and happy to provide you with [facility] visit,  view other health information, and more. To sign up or find more information, go to [email or web address] and click on the Sign Up Now link in the [employer] box.      Enter your [email or web address] Activation Code exactly as it appears below along with yo

## (undated) NOTE — Clinical Note
6/23/2017    Re: [facility] Showers         Dear Dr. Matthew Fink is OK for surgery from a pulmonary perspective. If you have any questions please call my office at [phone].

## (undated) NOTE — MR AVS SNAPSHOT
[facility]  [address] [address]  [phone]               Thank you for choosing us for your health care visit with Billy Perera MD.  We are glad to serve you and happy to provide you with this summary of your Medical Issues Discussed Today     DM type 2 (diabetes mellitus, type 2)    HTN (hypertension)      Instructions and Information about Your Health      Step-by-Step  Checking Your Blood Pressure    Date Last Reviewed: 4/27/2016  © 9643-1882 [employer] You can clip it to your belt (or a strap on your arm or leg) and go about your daily routine. \"Smartphones\" now also have apps to record your walking. At the end of the day, the pedometer shows the total number of steps you took.  Use a pedometer to set d keep an eye on your blood sugar level. Check often if you have been active for longer than usual, or if the activity was unplanned. Make it a habit to check your blood sugar before being active. And check again a few hours later.  Use your log book to write Commonly known as:  GLUCOPHAGE           ONETOUCH ULTRA 2 w/Device Kit   Diagnosis E11.9           ONETOUCH ULTRASOFT LANCETS Misc   Diagnosis E11.9           Pantoprazole Sodium 40 MG Tbec   Take 1 tablet (40 mg total) by mouth every morning before breakf ? Use a non skid rubber mat in the tub/shower. ? If you are unsteady on your feet you may want to use a shower chair/bench and a hand held shower head while bathing/showering.   BEDROOM:  ? Place light switches within reach of your bed and a night light be

## (undated) NOTE — MR AVS SNAPSHOT
[facility]  [address] [facility] [id number] [phone]               Thank you for choosing us for your health care visit with Sudhakar Bird MD.  We are glad to serve you and happy to provide you with this summary of your may be held responsible for payment in full if proper authorization is not acquired. Please contact the Patient Business Office at [phone] if you have any questions related to insurance coverage.          Reason for Today's Visit     Pre-Op Exam smoked meats, and salted potato chips. · Don’t add salt to your food at the table. · Use only small amounts of salt when cooking.   · Begin an exercise program. Talk with your health care provider about the type of exercise program that would be best for · Dizziness or dizziness with a spinning sensation (vertigo)  · Weakness of an arm or leg or one side of the face  · You have problems speaking or seeing   Date Last Reviewed: 11/25/2014  © 1495-4460 The [address] [place] [address] Mago Lopez Get to the heart of the problem  Women often don’t realize their symptoms could be related to heart trouble. Even some healthcare providers don’t make the connection.  If you feel any of the symptoms listed here, see your healthcare provider and ask to be t recent med list.                aspirin 81 MG Tabs   Take  by mouth. atorvastatin 20 MG Tabs   Take 1 tablet (20 mg total) by mouth nightly.    Commonly known as:  LIPITOR           Benazepril-Hydrochlorothiazide 10-12.5 MG Tabs   Take 1 tablet by ? Use non skid mats only. ? Clean up spills as soon as they happen. ? Keep objects that you use often within easy reach. BATHROOM:  ? Install grab bars on the bathroom walls beside the tub, shower and toilet. ?  Use a non skid rubber mat in the tub/sh

## (undated) NOTE — ED AVS SNAPSHOT
[place] [facility] [place] [address]    Phone:  [phone]    Fax:  [address]   MRN: [id number]    Department:  [facility]   Date of Visit:  1 physician may seek payment directly from you for amounts other than your deductible, co-payment, or co-insurance and for other services not covered under your health insurance plan.   Please contact your insurance company and physician's office to determine different from what your Primary Care doctor has instructed you - please continue to take your medications as instructed by your Primary Care doctor until you can check with your doctor. Please bring the medication list to your next doctor's appointment. Medicaid plans. To get signed up and covered, please call [phone] and ask to get set up for an insurance coverage that is in-network with [facility] S[facility]     Sign up for [employer], your secure online medical record.   [place]

## (undated) NOTE — MR AVS SNAPSHOT
[facility]  [address] [place] [address], [address] [place] [address]  [phone]               Thank you for choosing us for your health care visit with Keerthi Garcia.  Jackelyn Cruz MD.  We are glad to serve you and happy to provide you with this summary of If you are confident that your benefit plan will not require a referral or authorization, such as [place] Medicaid, please feel free to schedule your appointment immediately.  However, if you are unsure about the requirements for authorization, please wait Commonly known as:  GLUCOPHAGE           ONETOUCH ULTRA 2 w/Device Kit   Diagnosis E11.9           ONETOUCH ULTRASOFT LANCETS Misc   Diagnosis E11.9           Pantoprazole Sodium 40 MG Tbec   Take 1 tablet (40 mg total) by mouth every morning before breakf

## (undated) NOTE — LETTER
[facility]  Administration of Anesthesia  Chantelle CALDWELL agree to be cared for by a physician anesthesiologist alone and/or with a nurse anesthetist, who is specially trained to monitor me and give me medicine to put me to sleep or keep me comfortable during my procedure    I understand that my anesthesiologist and/or anesthetist is not an employee or agent of [facility] or [employer]. He or she works for [facility]    As the patient asking for anesthesia services, I agree to:  Allow the anesthesiologist (anesthesia doctor) to give me medicine and do additional procedures as necessary. Some examples are: Starting or using an “IV” to give me medicine, fluids or blood during my procedure, and having a breathing tube placed to help me breathe when I’m asleep (intubation). In the event that my heart stops working properly, I understand that my anesthesiologist will make every effort to sustain my life, unless otherwise directed by [facility] Do Not Resuscitate documents.  Tell my anesthesia doctor before my procedure:  If I am pregnant.  The last time that I ate or drank.  iii. All of the medicines I take (including prescriptions, herbal supplements, and pills I can buy without a prescription (including street drugs/illegal medications). Failure to inform my anesthesiologist about these medicines may increase my risk of anesthetic complications.  iv.If I am allergic to anything or have had a reaction to anesthesia before.  I understand how the anesthesia medicine will help me (benefits).  I understand that with any type of anesthesia medicine there are risks:  The most common risks are: nausea, vomiting, sore throat, muscle soreness, damage to my eyes, mouth, or teeth (from breathing tube placement).  Rare risks include: remembering what happened during my procedure, allergic reactions to medications, injury to my airway, heart, lungs, vision, nerves, or  muscles and in extremely rare instances death.  My doctor has explained to me other choices available to me for my care (alternatives).  Pregnant Patients (“epidural”):  I understand that the risks of having an epidural (medicine given into my back to help control pain during labor), include itching, low blood pressure, difficulty urinating, headache or slowing of the baby’s heart. Very rare risks include infection, bleeding, seizure, irregular heart rhythms and nerve injury.  Regional Anesthesia (“spinal”, “epidural”, & “nerve blocks”):  I understand that rare but potential complications include headache, bleeding, infection, seizure, irregular heart rhythms, and nerve injury.    _____________________________________________________________________________  Patient (or Representative) Signature/Relationship to Patient  Date   Time    _____________________________________________________________________________   Name (if used)    Language/Organization   Time    _____________________________________________________________________________  Nurse Anesthetist Signature     Date   Time  _____________________________________________________________________________  Anesthesiologist Signature     Date   Time  I have discussed the procedure and information above with the patient (or patient’s representative) and answered their questions. The patient or their representative has agreed to have anesthesia services.    _____________________________________________________________________________  Witness        Date   Time  I have verified that the signature is that of the patient or patient’s representative, and that it was signed before the procedure  Patient Name: Chantelle Al     : 3/5/1955                 Printed: 2025 at 8:24 AM    Medical Record #: [id number]                                            Page 1 of 1  ----------ANESTHESIA CONSENT----------